# Patient Record
Sex: MALE | Race: WHITE | NOT HISPANIC OR LATINO | Employment: OTHER | URBAN - METROPOLITAN AREA
[De-identification: names, ages, dates, MRNs, and addresses within clinical notes are randomized per-mention and may not be internally consistent; named-entity substitution may affect disease eponyms.]

---

## 2017-04-12 PROBLEM — R65.20 SEVERE SEPSIS(995.92): Status: ACTIVE | Noted: 2017-04-12

## 2017-04-12 PROBLEM — A41.9 SEVERE SEPSIS(995.92): Status: ACTIVE | Noted: 2017-04-12

## 2017-04-12 PROBLEM — K52.9 ACUTE COLITIS: Status: ACTIVE | Noted: 2017-04-12

## 2017-04-12 PROBLEM — I67.9 CEREBRAL VASCULAR DISEASE: Status: ACTIVE | Noted: 2017-04-12

## 2017-04-12 PROBLEM — K92.2 LOWER GI BLEED: Status: ACTIVE | Noted: 2017-04-12

## 2017-04-12 PROBLEM — K56.609 SMALL BOWEL OBSTRUCTION: Status: ACTIVE | Noted: 2017-04-12

## 2017-04-12 PROBLEM — N39.0 URINARY TRACT INFECTION WITHOUT HEMATURIA: Status: ACTIVE | Noted: 2017-04-12

## 2017-04-16 PROBLEM — K55.9 ISCHEMIC COLITIS: Status: ACTIVE | Noted: 2017-04-16

## 2017-04-28 ENCOUNTER — TELEPHONE (OUTPATIENT)
Dept: GASTROENTEROLOGY | Facility: CLINIC | Age: 52
End: 2017-04-28

## 2017-04-28 NOTE — TELEPHONE ENCOUNTER
----- Message from Brie Cox sent at 4/28/2017  2:43 PM CDT -----  Contact: [facility] [phone]  States that the patient has to have an appointment for a hospital follow up.  Can you please call [phone] Monday.  Thank you

## 2017-05-25 ENCOUNTER — PATIENT OUTREACH (OUTPATIENT)
Dept: ADMINISTRATIVE | Facility: HOSPITAL | Age: 52
End: 2017-05-25

## 2017-05-25 NOTE — PROGRESS NOTES
Overdue eye exam report, due an office visit with him, your fasting cholesterol and diabetes labs, your annual diabetic eye and foot exams, a urine test for your kidneys, colon cancer screening, and possibly pneumonia and tetanus immunizations

## 2017-05-25 NOTE — LETTER
June 2, 2017    Nadeem Alegre  [address] [facility] [address]             [facility]  [address]  [facility] [address]  Phone: [phone] Dear Mr. Alegre:    We have tried to reach you by mychart unsuccessfully.    [facility] is committed to your overall health.  To help you get the most out of each of your visits, we will review your information to make sure you are up to date on all of your recommended tests and/or procedures.       We have Dr. Clifford Gomez listed as your primary care provider.  You have not been in to see him since July of 2014.  If Dr. Gomez is no longer your primary care provider, please contact me so that we may update our records accordingly.       He has found that you may be due for an office visit with him, your fasting cholesterol and diabetes labs, your annual diabetic eye and foot exams, a urine test for your kidneys, colon cancer screening, and possibly pneumonia and tetanus immunizations.     If you have had any of the above done at an outside facility, please let us know so I can update your record.  If you have a copy of these records, please provide a copy for us to scan into your chart.  If not, please provide that provider/facilities contact information so that we may obtain copies from that facility.     Otherwise, please schedule these appointments at your earliest convenience.     If you have any questions or concerns, please don't hesitate to call.    Thank you for letting us care for you,  Vanessa Arnold LPN Clinical Care Coordinator  [facility]  [phone]

## 2017-06-08 DIAGNOSIS — R29.898 HAND PROBLEMS: Primary | ICD-10-CM

## 2017-06-21 DIAGNOSIS — R29.898 HAND PROBLEMS: Primary | ICD-10-CM

## 2017-07-13 ENCOUNTER — CLINICAL SUPPORT (OUTPATIENT)
Dept: REHABILITATION | Facility: HOSPITAL | Age: 52
End: 2017-07-13
Attending: FAMILY MEDICINE
Payer: MEDICARE

## 2017-07-13 DIAGNOSIS — G81.94 LEFT HEMIPARESIS: ICD-10-CM

## 2017-07-13 PROCEDURE — G8987 SELF CARE CURRENT STATUS: HCPCS | Mod: CL,PO

## 2017-07-13 PROCEDURE — G8988 SELF CARE GOAL STATUS: HCPCS | Mod: CL,PO

## 2017-07-13 PROCEDURE — 97165 OT EVAL LOW COMPLEX 30 MIN: CPT | Mod: PO

## 2017-07-13 PROCEDURE — G8989 SELF CARE D/C STATUS: HCPCS | Mod: CL,PO

## 2017-07-13 NOTE — PROGRESS NOTES
Name: Nadeem Alegre  MRN: [id number]   Physician: J Carlos Morris MD     Diagnosis:   Encounter Diagnosis   Name Primary?    Left hemiparesis         Onset date: Oct. 2014 meningitis and CVA    Date of service: 7/13/17  Start time: 1130  End time: 1215    Orders: Bioness training    Subjective:  Patient goals: to use L hand better for self care  Chief Complaint:   Chief Complaint   Patient presents with    OT Initial Evaluation      Past Medical History:   Diagnosis Date    Anxiety 8/3/2012    Depression 8/3/2012    HEARING LOSS     LBP (low back pain) 7/30/2012    Lumbar spondylosis 8/5/2014    Sleep apnea     uses bipap    Stroke       Current Outpatient Prescriptions   Medication Sig    amantadine HCl 100 mg Tab Take 100 mg by mouth 2 (two) times daily.    ascorbic acid, vitamin C, (VITAMIN C) 500 MG tablet Take 500 mg by mouth 2 (two) times daily.     aspirin 325 MG tablet Take 1 tablet (325 mg total) by mouth once daily.    CRANBERRY FRUIT EXTRACT (CRANBERRY CONCENTRATE ORAL) Take 1 oz by mouth every evening.    cyclobenzaprine (AMRIX) 30 MG 24 hr capsule Take 30 mg by mouth daily as needed for Muscle spasms.    dextromethorphan-quinidine 20-10 mg (NUEDEXTA) 20-10 mg per capsule Take 1 capsule by mouth every 12 (twelve) hours.    docusate sodium (COLACE) 100 MG capsule Take 200 mg by mouth 2 (two) times daily.     fenofibrate (TRICOR) 48 MG tablet 1 tablet (48 mg total) by Per G Tube route once daily.    fenofibrate micronized (ANTARA) 43 mg capsule Take 43 mg by mouth before breakfast.    fish oil-omega-3 fatty acids 300-1,000 mg capsule Take 2 g by mouth 2 (two) times daily.     hydrOXYzine pamoate (VISTARIL) 25 MG Cap Take 25 mg by mouth every 4 (four) hours as needed.     magnesium oxide (MAG-OX) 400 mg tablet Take 400 mg by mouth 2 (two) times daily.     melatonin 5 mg Tab Take by mouth nightly as needed.     methocarbamol (ROBAXIN) 500 MG Tab Take 750 mg by mouth 3 (three) times  daily.     metoprolol tartrate (LOPRESSOR) 25 MG tablet Take 2 tablets (50 mg total) by mouth 2 (two) times daily.    modafinil (PROVIGIL) 200 MG Tab Take 200 mg by mouth once daily.    multivitamin (ONE DAILY MULTIVITAMIN) per tablet Take 1 tablet by mouth once daily.    ondansetron (ZOFRAN-ODT) 8 MG TbDL Take 1 tablet (8 mg total) by mouth every 8 (eight) hours as needed.    pantoprazole (PROTONIX) 40 mg GrPS 1 packet (40 mg total) by Per G Tube route once daily.    polyethylene glycol (GLYCOLAX) 17 gram PwPk Take 17 g by mouth 2 (two) times daily.    potassium chloride SA (K-DUR,KLOR-CON) 20 MEQ tablet Take 20 mEq by mouth 2 (two) times daily.     senna (SENOKOT) 8.6 mg tablet Take 2 tablets by mouth 2 (two) times daily.     vitamin E 100 UNIT capsule Take 100 Units by mouth once daily.    zinc gluconate 50 mg tablet Take 50 mg by mouth once daily.     No current facility-administered medications for this visit.      Precautions: fall  Social Hx: disabled [employer]  lives with their spouse and young adult son    DME: power wheelchair with tilt, recline and stand J3 cushion, sheila, hospital bed,    Home access: w/c accessible    Review of patient's allergies indicates:  No Known Allergies      Past treatment includes: Home health PT and OT, Care PT in [facility] prior to April hosp for collitis.     Precautions:  Pain: 0/10 at rest.  Dominant hand: right    Occupation/hobbies/homemaking: Watch TV, card games, [employer]  Job description includes: disabled  Driving status: n/a    Objective  Cognitive Exam  Oriented: slow to respond  Behaviors: smiles appropriately  Follows Commands/attention: Inattentive  Communication: expressive aphasia  Memory: Poor immediate recall  Safety awareness/insight to disability: fair  Coping skills/emotional control: Appropriate to situation    Visual/perceptual: not assessed due to MD orders for Bioness training    Physical Exam:    Postural  examination/scapula alignment: B rounded shoulders  Joint integrity: WNL  Skin integrity: Tears on L elbow  Edema: none    Palpation: no palpable tenderness    Sensation: Nadeem reports normal sensation         Joint Evaluation AROM PROM    Left Left   Shoulder flex 0-180 90 115   Shoulder Abd 0-180 70 100   Elbow flex/ext 0-150     Wrist flex 0-80 WNL WNL   Wrist ext 0-70 WNL with finger flex WNL   Supination 0-80 WNL WNL   Pronation 0-80 WNL WNL     Fist: Slow to open and close. Fingers do not move together with open /close and pt. Cannot perform isolated finger movements.     Tone:  Modified Jairo Scale:   1-  Slight increase in muscle tone, manifested by a catch and release or by minimal resistance at the end of the range of motion when the affected part(s) is moved in flexion or extension    Balance:   Static Sit Fair  Dynamic sit  Fair  Static Stand  N/a  Dynamic stand  N/A    Functional Status:                                  Functional Mobility:  Bed mobility: Max A  Roll to left: Max A  Roll to right: Max A  Supine to sit: Max A  Sit to supine: Max A  Transfers to bed: D Jacoby  Transfers to toilet: D  Car transfers: stays in wheelchair  Wheelchair mobility: Min A with power wheelchair    ADL's:  Feeding: Min A  Grooming: Min A  Hygiene: Min A  UB Dressing: Max A  LB Dressing: Max A  Toileting: D  Bathing: Max A    IADL's:  Homecare: D  Cooking: D  Laundry: D  Yard work: D  Use of telephone: D  Money management: D  Medication management: D        NeuroQOL - Upper Extremity  Results:  Score  Intake 26.2  Patient responses to NeuroQOL - Upper Extremity were as follows:  Question Response at Intake Functional Limitation  Ability to button your shirt: With much difficulty Self Care -   Ability to turn a key in a lock: With some difficulty Carrying, Moving & Handling  Objects -   Ability to open and close a zipper: With much difficulty Self Care -   Difficulty putting on a pullover  shirt: A Lot of Difficulty Self Care -     TODAY'S TREATMENT  Pt. Has received a Bioness H200 with Flex A and Ext C panels. Pt., his wife and another caregiver taught how to keysha and doff and care for unit once OT set controller. Setting A is neuro modulation that should be done 10 minutes with WB and Setting B is open/close x 30 minutes that should be done with reach,grasp and place/release tasks.   Pt. Was given therapist card for home health therapist and for him/caregivers so they could follow up by phone or e-mail in regards to use and adjustment needs.     ASSESSMENT:  OT diagnosis: R hemiparesis    Assessment  Nadeem Alegre is a 52 y.o. male with a medical diagnosis of   Encounter Diagnosis   Name Primary?    Left hemiparesis     referred to occupational therapy for Bioness H200 training.       Pt. Currently receiving OT HH. OP follow up for adjustments only.   PROBLEM LIST/IMPAIRMENTS:   R hemiparesis affecting self care.     LTG GOALS:  Time frame: 3 months  Pt. Will hold items in L hand that are being manipulated by right.  Pt. Wife will be able to keysha/doff H200 and direct patient in HEP    PLAN:    Patient/caregiver understands and agrees with plan of care. Pt. Wife to call if any needs for adjustment of H200.     Sharonda Quiles, OT

## 2017-07-14 NOTE — PLAN OF CARE
Name: Nadeem Alegre  MRN: [id number]   Physician: J Carlos Morris MD     Diagnosis:   Encounter Diagnosis   Name Primary?    Left hemiparesis         Onset date: Oct. 2014 meningitis and CVA    Date of service: 7/13/17  Start time: 1130  End time: 1215    Orders: Bioness training    Subjective:  Patient goals: to use L hand better for self care  Chief Complaint:   Chief Complaint   Patient presents with    OT Initial Evaluation      Past Medical History:   Diagnosis Date    Anxiety 8/3/2012    Depression 8/3/2012    HEARING LOSS     LBP (low back pain) 7/30/2012    Lumbar spondylosis 8/5/2014    Sleep apnea     uses bipap    Stroke       Current Outpatient Prescriptions   Medication Sig    amantadine HCl 100 mg Tab Take 100 mg by mouth 2 (two) times daily.    ascorbic acid, vitamin C, (VITAMIN C) 500 MG tablet Take 500 mg by mouth 2 (two) times daily.     aspirin 325 MG tablet Take 1 tablet (325 mg total) by mouth once daily.    CRANBERRY FRUIT EXTRACT (CRANBERRY CONCENTRATE ORAL) Take 1 oz by mouth every evening.    cyclobenzaprine (AMRIX) 30 MG 24 hr capsule Take 30 mg by mouth daily as needed for Muscle spasms.    dextromethorphan-quinidine 20-10 mg (NUEDEXTA) 20-10 mg per capsule Take 1 capsule by mouth every 12 (twelve) hours.    docusate sodium (COLACE) 100 MG capsule Take 200 mg by mouth 2 (two) times daily.     fenofibrate (TRICOR) 48 MG tablet 1 tablet (48 mg total) by Per G Tube route once daily.    fenofibrate micronized (ANTARA) 43 mg capsule Take 43 mg by mouth before breakfast.    fish oil-omega-3 fatty acids 300-1,000 mg capsule Take 2 g by mouth 2 (two) times daily.     hydrOXYzine pamoate (VISTARIL) 25 MG Cap Take 25 mg by mouth every 4 (four) hours as needed.     magnesium oxide (MAG-OX) 400 mg tablet Take 400 mg by mouth 2 (two) times daily.     melatonin 5 mg Tab Take by mouth nightly as needed.     methocarbamol (ROBAXIN) 500 MG Tab Take 750 mg by mouth 3 (three) times  daily.     metoprolol tartrate (LOPRESSOR) 25 MG tablet Take 2 tablets (50 mg total) by mouth 2 (two) times daily.    modafinil (PROVIGIL) 200 MG Tab Take 200 mg by mouth once daily.    multivitamin (ONE DAILY MULTIVITAMIN) per tablet Take 1 tablet by mouth once daily.    ondansetron (ZOFRAN-ODT) 8 MG TbDL Take 1 tablet (8 mg total) by mouth every 8 (eight) hours as needed.    pantoprazole (PROTONIX) 40 mg GrPS 1 packet (40 mg total) by Per G Tube route once daily.    polyethylene glycol (GLYCOLAX) 17 gram PwPk Take 17 g by mouth 2 (two) times daily.    potassium chloride SA (K-DUR,KLOR-CON) 20 MEQ tablet Take 20 mEq by mouth 2 (two) times daily.     senna (SENOKOT) 8.6 mg tablet Take 2 tablets by mouth 2 (two) times daily.     vitamin E 100 UNIT capsule Take 100 Units by mouth once daily.    zinc gluconate 50 mg tablet Take 50 mg by mouth once daily.     No current facility-administered medications for this visit.      Precautions: fall  Social Hx: disabled [employer]  lives with their spouse and young adult son    DME: power wheelchair with tilt, recline and stand J3 cushion, sheila, hospital bed,    Home access: w/c accessible    Review of patient's allergies indicates:  No Known Allergies      Past treatment includes: Home health PT and OT, Care PT in [facility] prior to April hosp for collitis.     Precautions:  Pain: 0/10 at rest.  Dominant hand: right    Occupation/hobbies/homemaking: Watch TV, card games, [employer]  Job description includes: disabled  Driving status: n/a    Objective  Cognitive Exam  Oriented: slow to respond  Behaviors: smiles appropriately  Follows Commands/attention: Inattentive  Communication: expressive aphasia  Memory: Poor immediate recall  Safety awareness/insight to disability: fair  Coping skills/emotional control: Appropriate to situation    Visual/perceptual: not assessed due to MD orders for Bioness training    Physical Exam:    Postural  examination/scapula alignment: B rounded shoulders  Joint integrity: WNL  Skin integrity: Tears on L elbow  Edema: none    Palpation: no palpable tenderness    Sensation: Nadeem reports normal sensation         Joint Evaluation AROM PROM    Left Left   Shoulder flex 0-180 90 115   Shoulder Abd 0-180 70 100   Elbow flex/ext 0-150     Wrist flex 0-80 WNL WNL   Wrist ext 0-70 WNL with finger flex WNL   Supination 0-80 WNL WNL   Pronation 0-80 WNL WNL     Fist: Slow to open and close. Fingers do not move together with open /close and pt. Cannot perform isolated finger movements.     Tone:  Modified Jairo Scale:   1-  Slight increase in muscle tone, manifested by a catch and release or by minimal resistance at the end of the range of motion when the affected part(s) is moved in flexion or extension    Balance:   Static Sit Fair  Dynamic sit  Fair  Static Stand  N/a  Dynamic stand  N/A    Functional Status:                                  Functional Mobility:  Bed mobility: Max A  Roll to left: Max A  Roll to right: Max A  Supine to sit: Max A  Sit to supine: Max A  Transfers to bed: D Jacoby  Transfers to toilet: D  Car transfers: stays in wheelchair  Wheelchair mobility: Min A with power wheelchair    ADL's:  Feeding: Min A  Grooming: Min A  Hygiene: Min A  UB Dressing: Max A  LB Dressing: Max A  Toileting: D  Bathing: Max A    IADL's:  Homecare: D  Cooking: D  Laundry: D  Yard work: D  Use of telephone: D  Money management: D  Medication management: D        NeuroQOL - Upper Extremity  Results:  Score  Intake 26.2  Patient responses to NeuroQOL - Upper Extremity were as follows:  Question Response at Intake Functional Limitation  Ability to button your shirt: With much difficulty Self Care -   Ability to turn a key in a lock: With some difficulty Carrying, Moving & Handling  Objects -   Ability to open and close a zipper: With much difficulty Self Care -   Difficulty putting on a pullover  shirt: A Lot of Difficulty Self Care -     TODAY'S TREATMENT  Pt. Has received a Bioness H200 with Flex A and Ext C panels. Pt., his wife and another caregiver taught how to keysha and doff and care for unit once OT set controller. Setting A is neuro modulation that should be done 10 minutes with WB and Setting B is open/close x 30 minutes that should be done with reach,grasp and place/release tasks.   Pt. Was given therapist card for home health therapist and for him/caregivers so they could follow up by phone or e-mail in regards to use and adjustment needs.     ASSESSMENT:  OT diagnosis: R hemiparesis    Assessment  Nadeem Alegre is a 52 y.o. male with a medical diagnosis of   Encounter Diagnosis   Name Primary?    Left hemiparesis     referred to occupational therapy for Bioness H200 training.       Pt. Currently receiving OT HH. OP follow up for adjustments only.   PROBLEM LIST/IMPAIRMENTS:   R hemiparesis affecting self care.     LTG GOALS:  Time frame: 3 months  Pt. Will hold items in L hand that are being manipulated by right.  Pt. Wife will be able to keysha/doff H200 and direct patient in HEP    PLAN:    Patient/caregiver understands and agrees with plan of care. Pt. Wife to call if any needs for adjustment of H200.     Sharonda Quiles, OT

## 2017-07-27 ENCOUNTER — PATIENT OUTREACH (OUTPATIENT)
Dept: ADMINISTRATIVE | Facility: HOSPITAL | Age: 52
End: 2017-07-27

## 2017-07-27 NOTE — PROGRESS NOTES
Eye exam report, due an office visit with him, your fasting cholesterol and diabetes labs, a urine test for your kidneys, your annual diabetic eye and foot exams, colon cancer screening, and possibly tetanus and pneumonia immunizations    Last ov Jason 8/2014, 2nd attempt

## 2017-07-27 NOTE — LETTER
August 7, 2017    Nadeem Sis  [address] [facility] [address]             [facility]  [address]  [facility] [address]  Phone: [phone] Dear Mr. Alegre:    We have tried to reach you by mychart unsuccessfully.    [facility] is committed to your overall health.  To help you get the most out of each of your visits, we will review your information to make sure you are up to date on all of your recommended tests and/or procedures.       We have Dr. Clifford Gomez listed as your primary care provider. You have not been in to see him since August of 2014.  If Dr. Gomez is no longer your primary care provider, please contact me so that we may update our records accordingly.       He has found that you may be due for an office visit with him, your fasting cholesterol and diabetes labs, a urine test for your kidneys, your annual diabetic eye and foot exams, colon cancer screening, and possibly tetanus and pneumonia immunizations.     If you have had any of the above done at an outside facility, please let us know so I can update your record.  If you have a copy of these records, please provide a copy for us to scan into your chart.  If not, please provide that provider/facilities contact information so that we may obtain copies from that facility.     Otherwise, please schedule these appointments at your earliest convenience.     If you have any questions or concerns, please don't hesitate to call.    Thank you for letting us care for you,  Vanessa Arnold LPN Clinical Care Coordinator  [facility] [place]  [phone]

## 2019-05-23 PROCEDURE — G0179 PR HOME HEALTH MD RECERTIFICATION: ICD-10-PCS | Mod: ,,, | Performed by: INTERNAL MEDICINE

## 2019-05-23 PROCEDURE — G0179 MD RECERTIFICATION HHA PT: HCPCS | Mod: ,,, | Performed by: INTERNAL MEDICINE

## 2019-06-04 ENCOUNTER — TELEPHONE (OUTPATIENT)
Dept: FAMILY MEDICINE | Facility: CLINIC | Age: 54
End: 2019-06-04

## 2019-06-04 NOTE — TELEPHONE ENCOUNTER
----- Message from Henna Orourke sent at 6/4/2019  1:23 PM CDT -----  Contact: [facility] need to speak to nurse regarding if  would be following patient and signing orders     [facility] [phone] ask for Sabine

## 2019-06-04 NOTE — TELEPHONE ENCOUNTER
Former Art[facility] patient; wants to establish with [facility] and needs MD to sign all HH paperwork.  Please advise on whether you will accept patient

## 2019-06-18 ENCOUNTER — TELEPHONE (OUTPATIENT)
Dept: FAMILY MEDICINE | Facility: CLINIC | Age: 54
End: 2019-06-18

## 2019-06-18 NOTE — TELEPHONE ENCOUNTER
----- Message from Clifford Latham sent at 6/18/2019 10:21 AM CDT -----  Contact: Mely - Spouse  Type: Needs Medical Advice    Who Called:  Mely  Darian Call Back Number: [phone]  Additional Information: Caller would like to discuss home health orders. Thanks!

## 2019-06-28 ENCOUNTER — LAB VISIT (OUTPATIENT)
Dept: LAB | Facility: HOSPITAL | Age: 54
End: 2019-06-28
Attending: INTERNAL MEDICINE
Payer: MEDICARE

## 2019-06-28 ENCOUNTER — OFFICE VISIT (OUTPATIENT)
Dept: FAMILY MEDICINE | Facility: CLINIC | Age: 54
End: 2019-06-28
Payer: MEDICARE

## 2019-06-28 VITALS
OXYGEN SATURATION: 97 % | DIASTOLIC BLOOD PRESSURE: 82 MMHG | HEART RATE: 69 BPM | SYSTOLIC BLOOD PRESSURE: 106 MMHG | BODY MASS INDEX: 33.86 KG/M2 | HEIGHT: 72 IN | WEIGHT: 250 LBS

## 2019-06-28 DIAGNOSIS — E78.2 MIXED HYPERLIPIDEMIA: Chronic | ICD-10-CM

## 2019-06-28 DIAGNOSIS — G47.33 OBSTRUCTIVE SLEEP APNEA (ADULT) (PEDIATRIC): Chronic | ICD-10-CM

## 2019-06-28 DIAGNOSIS — I63.50 CEREBRAL ARTERY OCCLUSION WITH CEREBRAL INFARCTION: ICD-10-CM

## 2019-06-28 DIAGNOSIS — E11.65 UNCONTROLLED TYPE 2 DIABETES MELLITUS WITH HYPERGLYCEMIA: ICD-10-CM

## 2019-06-28 DIAGNOSIS — L89.152 DECUBITUS ULCER OF SACRAL REGION, STAGE 2: ICD-10-CM

## 2019-06-28 DIAGNOSIS — Z12.5 PROSTATE CANCER SCREENING: ICD-10-CM

## 2019-06-28 DIAGNOSIS — R33.9 URINARY RETENTION: ICD-10-CM

## 2019-06-28 DIAGNOSIS — K21.9 GASTROESOPHAGEAL REFLUX DISEASE WITHOUT ESOPHAGITIS: ICD-10-CM

## 2019-06-28 DIAGNOSIS — G93.40 ENCEPHALOPATHY: Primary | ICD-10-CM

## 2019-06-28 DIAGNOSIS — I69.354 SPASTIC HEMIPLEGIA OF LEFT NONDOMINANT SIDE AS LATE EFFECT OF CEREBRAL INFARCTION: ICD-10-CM

## 2019-06-28 DIAGNOSIS — R53.81 DEBILITY: ICD-10-CM

## 2019-06-28 DIAGNOSIS — K55.039: ICD-10-CM

## 2019-06-28 PROBLEM — K55.9 ISCHEMIC COLITIS: Status: RESOLVED | Noted: 2017-04-16 | Resolved: 2019-06-28

## 2019-06-28 PROBLEM — K56.609 SMALL BOWEL OBSTRUCTION: Status: RESOLVED | Noted: 2017-04-12 | Resolved: 2019-06-28

## 2019-06-28 PROBLEM — K52.9 ACUTE COLITIS: Status: RESOLVED | Noted: 2017-04-12 | Resolved: 2019-06-28

## 2019-06-28 PROBLEM — A41.9 SEVERE SEPSIS(995.92): Status: RESOLVED | Noted: 2017-04-12 | Resolved: 2019-06-28

## 2019-06-28 PROBLEM — R65.20 SEVERE SEPSIS(995.92): Status: RESOLVED | Noted: 2017-04-12 | Resolved: 2019-06-28

## 2019-06-28 PROBLEM — N39.0 URINARY TRACT INFECTION WITHOUT HEMATURIA: Status: RESOLVED | Noted: 2017-04-12 | Resolved: 2019-06-28

## 2019-06-28 PROBLEM — K92.2 LOWER GI BLEED: Status: RESOLVED | Noted: 2017-04-12 | Resolved: 2019-06-28

## 2019-06-28 LAB
ALBUMIN SERPL BCP-MCNC: 3.8 G/DL (ref 3.5–5.2)
ALP SERPL-CCNC: 68 U/L (ref 55–135)
ALT SERPL W/O P-5'-P-CCNC: 17 U/L (ref 10–44)
ANION GAP SERPL CALC-SCNC: 10 MMOL/L (ref 8–16)
AST SERPL-CCNC: 14 U/L (ref 10–40)
BASOPHILS # BLD AUTO: 0.07 K/UL (ref 0–0.2)
BASOPHILS NFR BLD: 1.2 % (ref 0–1.9)
BILIRUB SERPL-MCNC: 0.4 MG/DL (ref 0.1–1)
BUN SERPL-MCNC: 14 MG/DL (ref 6–20)
CALCIUM SERPL-MCNC: 9.5 MG/DL (ref 8.7–10.5)
CHLORIDE SERPL-SCNC: 108 MMOL/L (ref 95–110)
CHOLEST SERPL-MCNC: 163 MG/DL (ref 120–199)
CHOLEST/HDLC SERPL: 5.6 {RATIO} (ref 2–5)
CO2 SERPL-SCNC: 23 MMOL/L (ref 23–29)
COMPLEXED PSA SERPL-MCNC: 0.49 NG/ML (ref 0–4)
CREAT SERPL-MCNC: 0.9 MG/DL (ref 0.5–1.4)
DIFFERENTIAL METHOD: ABNORMAL
EOSINOPHIL # BLD AUTO: 0.1 K/UL (ref 0–0.5)
EOSINOPHIL NFR BLD: 2.4 % (ref 0–8)
ERYTHROCYTE [DISTWIDTH] IN BLOOD BY AUTOMATED COUNT: 14.9 % (ref 11.5–14.5)
EST. GFR  (AFRICAN AMERICAN): >60 ML/MIN/1.73 M^2
EST. GFR  (NON AFRICAN AMERICAN): >60 ML/MIN/1.73 M^2
GLUCOSE SERPL-MCNC: 92 MG/DL (ref 70–110)
HCT VFR BLD AUTO: 46.3 % (ref 40–54)
HDLC SERPL-MCNC: 29 MG/DL (ref 40–75)
HDLC SERPL: 17.8 % (ref 20–50)
HGB BLD-MCNC: 14.2 G/DL (ref 14–18)
IMM GRANULOCYTES # BLD AUTO: 0.01 K/UL (ref 0–0.04)
IMM GRANULOCYTES NFR BLD AUTO: 0.2 % (ref 0–0.5)
LDLC SERPL CALC-MCNC: 115.4 MG/DL (ref 63–159)
LYMPHOCYTES # BLD AUTO: 2.6 K/UL (ref 1–4.8)
LYMPHOCYTES NFR BLD: 45.4 % (ref 18–48)
MCH RBC QN AUTO: 27.8 PG (ref 27–31)
MCHC RBC AUTO-ENTMCNC: 30.7 G/DL (ref 32–36)
MCV RBC AUTO: 91 FL (ref 82–98)
MONOCYTES # BLD AUTO: 0.5 K/UL (ref 0.3–1)
MONOCYTES NFR BLD: 9.1 % (ref 4–15)
NEUTROPHILS # BLD AUTO: 2.4 K/UL (ref 1.8–7.7)
NEUTROPHILS NFR BLD: 41.7 % (ref 38–73)
NONHDLC SERPL-MCNC: 134 MG/DL
NRBC BLD-RTO: 0 /100 WBC
PLATELET # BLD AUTO: 224 K/UL (ref 150–350)
PMV BLD AUTO: 11.9 FL (ref 9.2–12.9)
POTASSIUM SERPL-SCNC: 4.1 MMOL/L (ref 3.5–5.1)
PROT SERPL-MCNC: 7.6 G/DL (ref 6–8.4)
RBC # BLD AUTO: 5.11 M/UL (ref 4.6–6.2)
SODIUM SERPL-SCNC: 141 MMOL/L (ref 136–145)
TRIGL SERPL-MCNC: 93 MG/DL (ref 30–150)
TSH SERPL DL<=0.005 MIU/L-ACNC: 1.76 UIU/ML (ref 0.4–4)
WBC # BLD AUTO: 5.81 K/UL (ref 3.9–12.7)

## 2019-06-28 PROCEDURE — 84153 ASSAY OF PSA TOTAL: CPT

## 2019-06-28 PROCEDURE — 3008F PR BODY MASS INDEX (BMI) DOCUMENTED: ICD-10-PCS | Mod: CPTII,S$GLB,, | Performed by: INTERNAL MEDICINE

## 2019-06-28 PROCEDURE — 99999 PR PBB SHADOW E&M-EST. PATIENT-LVL III: ICD-10-PCS | Mod: PBBFAC,,, | Performed by: INTERNAL MEDICINE

## 2019-06-28 PROCEDURE — 84443 ASSAY THYROID STIM HORMONE: CPT

## 2019-06-28 PROCEDURE — 3008F BODY MASS INDEX DOCD: CPT | Mod: CPTII,S$GLB,, | Performed by: INTERNAL MEDICINE

## 2019-06-28 PROCEDURE — 3074F PR MOST RECENT SYSTOLIC BLOOD PRESSURE < 130 MM HG: ICD-10-PCS | Mod: CPTII,S$GLB,, | Performed by: INTERNAL MEDICINE

## 2019-06-28 PROCEDURE — 3079F DIAST BP 80-89 MM HG: CPT | Mod: CPTII,S$GLB,, | Performed by: INTERNAL MEDICINE

## 2019-06-28 PROCEDURE — 99999 PR PBB SHADOW E&M-EST. PATIENT-LVL III: CPT | Mod: PBBFAC,,, | Performed by: INTERNAL MEDICINE

## 2019-06-28 PROCEDURE — 85025 COMPLETE CBC W/AUTO DIFF WBC: CPT

## 2019-06-28 PROCEDURE — 80053 COMPREHEN METABOLIC PANEL: CPT

## 2019-06-28 PROCEDURE — 99214 OFFICE O/P EST MOD 30 MIN: CPT | Mod: S$GLB,,, | Performed by: INTERNAL MEDICINE

## 2019-06-28 PROCEDURE — 36415 COLL VENOUS BLD VENIPUNCTURE: CPT | Mod: PO

## 2019-06-28 PROCEDURE — 3079F PR MOST RECENT DIASTOLIC BLOOD PRESSURE 80-89 MM HG: ICD-10-PCS | Mod: CPTII,S$GLB,, | Performed by: INTERNAL MEDICINE

## 2019-06-28 PROCEDURE — 80061 LIPID PANEL: CPT

## 2019-06-28 PROCEDURE — 99214 PR OFFICE/OUTPT VISIT, EST, LEVL IV, 30-39 MIN: ICD-10-PCS | Mod: S$GLB,,, | Performed by: INTERNAL MEDICINE

## 2019-06-28 PROCEDURE — 3074F SYST BP LT 130 MM HG: CPT | Mod: CPTII,S$GLB,, | Performed by: INTERNAL MEDICINE

## 2019-06-28 RX ORDER — NAPROXEN SODIUM 220 MG/1
81 TABLET, FILM COATED ORAL DAILY
Refills: 0 | Status: ON HOLD | COMMUNITY
Start: 2019-06-28 | End: 2020-04-01 | Stop reason: HOSPADM

## 2019-06-28 NOTE — PROGRESS NOTES
Patient ID: Nadeem Alegre     Chief Complaint:   Chief Complaint   Patient presents with    [facility]        HPI: New Patient to me and to [facility]. 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 He also had a Right CVA afterwards and has been severely disabled since then. He presents today in a power chair w/ family in attendance.   He gets the majority of his care thorough the [facility]. He does have a suprapubic cath and is prone to UTI's- last one was resistant to Bactrim.   Also has a sacral decub that is chronic and waxes and wanes. Needs Colonoscopy but it will be technically difficult.     Review of Systems   Constitutional: Positive for activity change. Negative for unexpected weight change.   HENT: Positive for hearing loss and trouble swallowing. Negative for rhinorrhea.    Eyes: Positive for visual disturbance. Negative for discharge.   Respiratory: Negative for chest tightness and wheezing.    Cardiovascular: Negative for chest pain and palpitations.   Gastrointestinal: Positive for blood in stool and constipation. Negative for diarrhea and vomiting.   Endocrine: Negative for polydipsia and polyuria.   Genitourinary: Positive for difficulty urinating and hematuria. Negative for urgency.   Musculoskeletal: Positive for arthralgias, joint swelling and neck pain.   Skin: Negative.    Allergic/Immunologic: Negative.    Neurological: Positive for weakness. Negative for headaches.   Psychiatric/Behavioral: Positive for confusion. Negative for dysphoric mood.   All other systems reviewed and are negative.         Objective:      Physical Exam   Physical Exam   Constitutional: He is oriented to person, place, and time. He appears well-developed and well-nourished.   HENT:   Head: Normocephalic and atraumatic.   Nose: Nose normal.   Mouth/Throat: Oropharynx is clear and moist.   Eyes: Pupils are equal, round, and reactive to light. Conjunctivae and EOM are normal.   Neck: Normal range of  motion. Neck supple.   Cardiovascular: Normal rate, regular rhythm, normal heart sounds and intact distal pulses.   Pulmonary/Chest: Effort normal and breath sounds normal.   Abdominal: Soft. Bowel sounds are normal.   Musculoskeletal:   Left hemiparesis.  Ambulates in power chair.    Neurological: He is alert and oriented to person, place, and time. He exhibits abnormal muscle tone. Coordination abnormal.   Skin: Skin is warm and dry. Capillary refill takes less than 2 seconds.   Psychiatric: He has a normal mood and affect. His behavior is normal. Judgment and thought content normal.   Nursing note and vitals reviewed.         Vitals:   Vitals:    06/28/19 1158   BP: 106/82   Pulse: 69       Assessment:       Patient Active Problem List    Diagnosis Date Noted    Debility 06/28/2019    Urinary retention 06/28/2019    Gastroesophageal reflux disease without esophagitis 06/28/2019    Acute ischemia of large intestine 06/28/2019    Left hemiparesis 07/13/2017    Cerebral vascular disease 04/12/2017    Hypomagnesemia 11/25/2014    Decubitus ulcer of sacral region, stage 2 11/24/2014    Involuntary movements 11/22/2014    Status post mastoidectomy 11/21/2014    Cerebral artery occlusion with cerebral infarction 11/21/2014    HLD (hyperlipidemia) 11/21/2014    Encephalopathy 11/20/2014    Obesity, Class II, BMI 35-39.9, with comorbidity 08/03/2012    Obstructive sleep apnea (adult) (pediatric) 08/03/2012          Plan:       Nadeem was seen today for establish care.    Diagnoses and all orders for this visit:    Encephalopathy  Slowly improving     Mixed hyperlipidemia  -     CBC auto differential; Future  -     Comprehensive metabolic panel; Future  -     Lipid panel; Future  -     TSH; Future    Obstructive sleep apnea (adult) (pediatric)  Uses CPAP on/ off    Cerebral artery occlusion with cerebral infarction  Aspirin    Debility  CONTROLLED      Urinary retention  S/p Suprapubic Cath     Decubitus  ulcer of sacral region, stage 2  Offloading and local wound care     Gastroesophageal reflux disease without esophagitis  CONTROLLED  W/ PPi    Spastic hemiplegia of left nondominant side as late effect of cerebral infarction  Stable     Prostate cancer screening  -     PSA, Screening; Future    Other orders  -     aspirin 81 MG Chew; Take 1 tablet (81 mg total) by mouth once daily.

## 2019-07-05 DIAGNOSIS — Z12.11 COLON CANCER SCREENING: ICD-10-CM

## 2019-07-12 ENCOUNTER — DOCUMENTATION ONLY (OUTPATIENT)
Dept: FAMILY MEDICINE | Facility: CLINIC | Age: 54
End: 2019-07-12

## 2019-07-22 PROCEDURE — G0179 PR HOME HEALTH MD RECERTIFICATION: ICD-10-PCS | Mod: ,,, | Performed by: INTERNAL MEDICINE

## 2019-07-22 PROCEDURE — G0179 MD RECERTIFICATION HHA PT: HCPCS | Mod: ,,, | Performed by: INTERNAL MEDICINE

## 2019-08-13 ENCOUNTER — PATIENT MESSAGE (OUTPATIENT)
Dept: FAMILY MEDICINE | Facility: CLINIC | Age: 54
End: 2019-08-13

## 2019-08-13 ENCOUNTER — TELEPHONE (OUTPATIENT)
Dept: FAMILY MEDICINE | Facility: CLINIC | Age: 54
End: 2019-08-13

## 2019-08-13 NOTE — TELEPHONE ENCOUNTER
----- Message from Keysha Chandler sent at 8/13/2019  1:52 PM CDT -----  Type: Needs Medical Advice    Who Called:  Sabine donnelly [facility]  Symptoms (please be specific):  UTI  How long has patient had these symptoms:   Pharmacy name and phone #: [facility] [phone]  Best Call Back Number: [phone]  Additional Information: results faxed on 08/09/19 and 08/12/19, contact to advise if medication will be order

## 2019-08-14 DIAGNOSIS — N30.00 ACUTE CYSTITIS WITHOUT HEMATURIA: Primary | ICD-10-CM

## 2019-08-14 RX ORDER — NITROFURANTOIN (MACROCRYSTALS) 100 MG/1
100 CAPSULE ORAL EVERY 6 HOURS
Qty: 28 CAPSULE | Refills: 0 | Status: SHIPPED | OUTPATIENT
Start: 2019-08-14 | End: 2019-08-21

## 2019-08-14 NOTE — TELEPHONE ENCOUNTER
----- Message from Shiloh Newberry sent at 8/14/2019  8:35 AM CDT -----  Contact: Patient's Spouse (Mely)  Type:  Patient Returning Call    Who Called:  spouse  Who Left Message for Patient: Leesa Barrett   Does the patient know what this is regarding?:  Test results  Best Call Back Number:  [phone]  Additional Information:  Pt Spouse returning the nurses call Please Advise--Thank you

## 2019-08-14 NOTE — TELEPHONE ENCOUNTER
You're right, he has a UTI. I want to give him Nitrofurantoin based on the urine culture growing Klebsiella and Enterococcus. What pharmacy do I send it to?

## 2019-08-25 RX ORDER — PANTOPRAZOLE SODIUM 40 MG/1
40 FOR SUSPENSION ORAL DAILY
Qty: 30 PACKET | Refills: 11 | Status: SHIPPED | OUTPATIENT
Start: 2019-08-25 | End: 2021-12-07

## 2019-08-25 RX ORDER — POTASSIUM CHLORIDE 20 MEQ/1
20 TABLET, EXTENDED RELEASE ORAL 2 TIMES DAILY
Qty: 180 TABLET | Refills: 3 | Status: SHIPPED | OUTPATIENT
Start: 2019-08-25 | End: 2023-11-21

## 2019-08-25 RX ORDER — POLYETHYLENE GLYCOL 3350 17 G/17G
17 POWDER, FOR SOLUTION ORAL 2 TIMES DAILY
Qty: 100 EACH | Refills: 6 | Status: SHIPPED | OUTPATIENT
Start: 2019-08-25

## 2019-08-26 RX ORDER — ATORVASTATIN CALCIUM 20 MG/1
20 TABLET, FILM COATED ORAL DAILY
Qty: 90 TABLET | Refills: 3 | Status: SHIPPED | OUTPATIENT
Start: 2019-08-26 | End: 2023-01-06

## 2019-09-03 ENCOUNTER — TELEPHONE (OUTPATIENT)
Dept: FAMILY MEDICINE | Facility: CLINIC | Age: 54
End: 2019-09-03

## 2019-09-04 ENCOUNTER — TELEPHONE (OUTPATIENT)
Dept: HOME HEALTH SERVICES | Facility: HOSPITAL | Age: 54
End: 2019-09-04

## 2019-09-20 PROCEDURE — G0179 PR HOME HEALTH MD RECERTIFICATION: ICD-10-PCS | Mod: ,,, | Performed by: INTERNAL MEDICINE

## 2019-09-20 PROCEDURE — G0179 MD RECERTIFICATION HHA PT: HCPCS | Mod: ,,, | Performed by: INTERNAL MEDICINE

## 2019-09-25 ENCOUNTER — TELEPHONE (OUTPATIENT)
Dept: HOME HEALTH SERVICES | Facility: HOSPITAL | Age: 54
End: 2019-09-25

## 2019-09-25 ENCOUNTER — EXTERNAL HOME HEALTH (OUTPATIENT)
Dept: HOME HEALTH SERVICES | Facility: HOSPITAL | Age: 54
End: 2019-09-25
Payer: MEDICARE

## 2019-10-08 ENCOUNTER — TELEPHONE (OUTPATIENT)
Dept: FAMILY MEDICINE | Facility: CLINIC | Age: 54
End: 2019-10-08

## 2019-10-18 ENCOUNTER — PATIENT MESSAGE (OUTPATIENT)
Dept: FAMILY MEDICINE | Facility: CLINIC | Age: 54
End: 2019-10-18

## 2019-10-20 RX ORDER — CETIRIZINE HYDROCHLORIDE 1 MG/ML
10 SOLUTION ORAL DAILY
Qty: 473 ML | Refills: 0 | Status: SHIPPED | OUTPATIENT
Start: 2019-10-20 | End: 2020-10-19

## 2019-10-20 RX ORDER — IBUPROFEN 800 MG/1
800 TABLET ORAL EVERY 8 HOURS PRN
Qty: 90 TABLET | Refills: 5 | Status: SHIPPED | OUTPATIENT
Start: 2019-10-20

## 2019-10-21 ENCOUNTER — EXTERNAL HOME HEALTH (OUTPATIENT)
Dept: HOME HEALTH SERVICES | Facility: HOSPITAL | Age: 54
End: 2019-10-21
Payer: MEDICARE

## 2019-10-21 ENCOUNTER — PATIENT MESSAGE (OUTPATIENT)
Dept: ADMINISTRATIVE | Facility: HOSPITAL | Age: 54
End: 2019-10-21

## 2019-10-24 ENCOUNTER — TELEPHONE (OUTPATIENT)
Dept: HOME HEALTH SERVICES | Facility: HOSPITAL | Age: 54
End: 2019-10-24

## 2019-11-19 PROCEDURE — G0179 MD RECERTIFICATION HHA PT: HCPCS | Mod: ,,, | Performed by: INTERNAL MEDICINE

## 2019-11-19 PROCEDURE — G0179 PR HOME HEALTH MD RECERTIFICATION: ICD-10-PCS | Mod: ,,, | Performed by: INTERNAL MEDICINE

## 2019-12-05 ENCOUNTER — EXTERNAL HOME HEALTH (OUTPATIENT)
Dept: HOME HEALTH SERVICES | Facility: HOSPITAL | Age: 54
End: 2019-12-05
Payer: MEDICARE

## 2020-01-08 ENCOUNTER — PATIENT MESSAGE (OUTPATIENT)
Dept: FAMILY MEDICINE | Facility: CLINIC | Age: 55
End: 2020-01-08

## 2020-01-10 ENCOUNTER — TELEPHONE (OUTPATIENT)
Dept: HOME HEALTH SERVICES | Facility: HOSPITAL | Age: 55
End: 2020-01-10

## 2020-01-18 PROCEDURE — G0179 PR HOME HEALTH MD RECERTIFICATION: ICD-10-PCS | Mod: ,,, | Performed by: INTERNAL MEDICINE

## 2020-01-18 PROCEDURE — G0179 MD RECERTIFICATION HHA PT: HCPCS | Mod: ,,, | Performed by: INTERNAL MEDICINE

## 2020-01-23 ENCOUNTER — EXTERNAL HOME HEALTH (OUTPATIENT)
Dept: HOME HEALTH SERVICES | Facility: HOSPITAL | Age: 55
End: 2020-01-23
Payer: MEDICARE

## 2020-02-03 ENCOUNTER — TELEPHONE (OUTPATIENT)
Dept: HOME HEALTH SERVICES | Facility: HOSPITAL | Age: 55
End: 2020-02-03

## 2020-02-06 ENCOUNTER — EXTERNAL HOME HEALTH (OUTPATIENT)
Dept: HOME HEALTH SERVICES | Facility: HOSPITAL | Age: 55
End: 2020-02-06
Payer: MEDICARE

## 2020-02-21 ENCOUNTER — TELEPHONE (OUTPATIENT)
Dept: FAMILY MEDICINE | Facility: CLINIC | Age: 55
End: 2020-02-21

## 2020-02-21 DIAGNOSIS — T83.511D URINARY TRACT INFECTION ASSOCIATED WITH INDWELLING URETHRAL CATHETER, SUBSEQUENT ENCOUNTER: Primary | ICD-10-CM

## 2020-02-21 DIAGNOSIS — N39.0 URINARY TRACT INFECTION ASSOCIATED WITH INDWELLING URETHRAL CATHETER, SUBSEQUENT ENCOUNTER: Primary | ICD-10-CM

## 2020-02-21 RX ORDER — CEFDINIR 300 MG/1
300 CAPSULE ORAL 2 TIMES DAILY
Qty: 20 CAPSULE | Refills: 0 | Status: SHIPPED | OUTPATIENT
Start: 2020-02-21 | End: 2020-03-02

## 2020-02-21 NOTE — TELEPHONE ENCOUNTER
Yes, I reviewed his most recent urine culture.  Cefdinir will eradicate the Proteus that is growing in his urine.

## 2020-02-21 NOTE — TELEPHONE ENCOUNTER
Urinalysis looks like another urinary tract infection.  Will treat with cefdinir 300 mg by mouth twice daily for 10 days based on previous urine culture results.  Please get a urine culture this time as well.

## 2020-02-21 NOTE — TELEPHONE ENCOUNTER
----- Message from Elias Boyer sent at 2/21/2020 10:28 AM CST -----  Contact: Sabine  Type: Needs Medical Advice    Who Called:  Sabine with [facility]  Symptoms (please be specific):    How long has patient had these symptoms:    Pharmacy name and phone #:    Best Call Back Number: [phone]  Additional Information: stated that patient's urine results has been sent on 02/20 requesting a call back regarding what is order for the patient

## 2020-03-03 ENCOUNTER — TELEPHONE (OUTPATIENT)
Dept: HOME HEALTH SERVICES | Facility: HOSPITAL | Age: 55
End: 2020-03-03

## 2020-03-19 PROBLEM — S82.401A FRACTURE OF RIGHT TIBIA AND FIBULA: Status: ACTIVE | Noted: 2020-03-19

## 2020-03-19 PROBLEM — S82.201A FRACTURE OF RIGHT TIBIA AND FIBULA: Status: ACTIVE | Noted: 2020-03-19

## 2020-03-19 PROBLEM — R52 PAIN: Status: ACTIVE | Noted: 2020-03-19

## 2020-03-20 ENCOUNTER — TELEPHONE (OUTPATIENT)
Dept: FAMILY MEDICINE | Facility: CLINIC | Age: 55
End: 2020-03-20

## 2020-03-20 NOTE — TELEPHONE ENCOUNTER
----- Message from Liya Aj sent at 3/20/2020  3:49 PM CDT -----  Contact: Sabine with [facility]  Type: Needs Medical Advice    Who Called:  Sabine with [facility]  Best Call Back Number:  Additional Information: Calling to let the office know she will be faxing over the results of her patients urine culture because she does seem to have a UTI and they want to make sure Dr Seals is aware.

## 2020-03-20 NOTE — TELEPHONE ENCOUNTER
Spoke with Sabine with Home Health. Informed her the patient is admitted to the hospital and Dr. Seals is aware of the culture.

## 2020-03-21 PROBLEM — R00.0 SINUS TACHYCARDIA: Status: ACTIVE | Noted: 2020-03-21

## 2020-03-21 PROBLEM — Z97.8 CHRONIC INDWELLING FOLEY CATHETER: Status: ACTIVE | Noted: 2020-03-21

## 2020-03-23 PROBLEM — R07.9 CHEST PAIN: Status: ACTIVE | Noted: 2020-03-23

## 2020-03-25 PROBLEM — Z75.8 DISCHARGE PLANNING ISSUES: Status: ACTIVE | Noted: 2020-03-25

## 2020-03-25 PROBLEM — Z02.9 DISCHARGE PLANNING ISSUES: Status: ACTIVE | Noted: 2020-03-25

## 2020-03-27 PROBLEM — K59.00 CONSTIPATION: Status: ACTIVE | Noted: 2020-03-27

## 2020-03-28 PROBLEM — K59.00 CONSTIPATION: Status: RESOLVED | Noted: 2020-03-27 | Resolved: 2020-03-28

## 2020-03-31 PROBLEM — I25.118 CORONARY ARTERY DISEASE OF NATIVE ARTERY OF NATIVE HEART WITH STABLE ANGINA PECTORIS: Status: ACTIVE | Noted: 2020-03-23

## 2020-04-20 PROCEDURE — G0180 PR HOME HEALTH MD CERTIFICATION: ICD-10-PCS | Mod: ,,, | Performed by: INTERNAL MEDICINE

## 2020-04-20 PROCEDURE — G0180 MD CERTIFICATION HHA PATIENT: HCPCS | Mod: ,,, | Performed by: INTERNAL MEDICINE

## 2020-04-23 ENCOUNTER — DOCUMENT SCAN (OUTPATIENT)
Dept: HOME HEALTH SERVICES | Facility: HOSPITAL | Age: 55
End: 2020-04-23
Payer: MEDICARE

## 2020-04-29 ENCOUNTER — EXTERNAL HOME HEALTH (OUTPATIENT)
Dept: HOME HEALTH SERVICES | Facility: HOSPITAL | Age: 55
End: 2020-04-29
Payer: MEDICARE

## 2020-04-30 ENCOUNTER — TELEPHONE (OUTPATIENT)
Dept: ORTHOPEDICS | Facility: CLINIC | Age: 55
End: 2020-04-30

## 2020-04-30 ENCOUNTER — DOCUMENT SCAN (OUTPATIENT)
Dept: HOME HEALTH SERVICES | Facility: HOSPITAL | Age: 55
End: 2020-04-30
Payer: MEDICARE

## 2020-04-30 DIAGNOSIS — S82.201D CLOSED FRACTURE OF RIGHT TIBIA AND FIBULA WITH ROUTINE HEALING, SUBSEQUENT ENCOUNTER: Primary | ICD-10-CM

## 2020-04-30 DIAGNOSIS — S82.401D CLOSED FRACTURE OF RIGHT TIBIA AND FIBULA WITH ROUTINE HEALING, SUBSEQUENT ENCOUNTER: Primary | ICD-10-CM

## 2020-04-30 DIAGNOSIS — S72.492D OTHER CLOSED FRACTURE OF DISTAL END OF LEFT FEMUR WITH ROUTINE HEALING, SUBSEQUENT ENCOUNTER: ICD-10-CM

## 2020-04-30 NOTE — TELEPHONE ENCOUNTER
Attempted to call pt to ask COVID-19 and travel screening questions for upcoming appt and building entrance procedure. Unable to reach pt on the phone. Voicemail box full could not leave message. Sending My Chart message with screening questions. Thanks, Kendy

## 2020-05-01 ENCOUNTER — DOCUMENT SCAN (OUTPATIENT)
Dept: HOME HEALTH SERVICES | Facility: HOSPITAL | Age: 55
End: 2020-05-01
Payer: MEDICARE

## 2020-05-04 ENCOUNTER — TELEPHONE (OUTPATIENT)
Dept: FAMILY MEDICINE | Facility: CLINIC | Age: 55
End: 2020-05-04

## 2020-05-04 DIAGNOSIS — T83.511D URINARY TRACT INFECTION ASSOCIATED WITH INDWELLING URETHRAL CATHETER, SUBSEQUENT ENCOUNTER: Primary | ICD-10-CM

## 2020-05-04 DIAGNOSIS — N39.0 URINARY TRACT INFECTION ASSOCIATED WITH INDWELLING URETHRAL CATHETER, SUBSEQUENT ENCOUNTER: Primary | ICD-10-CM

## 2020-05-04 NOTE — TELEPHONE ENCOUNTER
Urinalysis from home health obtained on May 1st shows that he could have urinary tract infection however the urine culture is only growing [id number] colony-forming units of Proteus mirabilis.  Is this patient having symptoms or was this a routine screening urine sample?

## 2020-05-05 ENCOUNTER — DOCUMENT SCAN (OUTPATIENT)
Dept: HOME HEALTH SERVICES | Facility: HOSPITAL | Age: 55
End: 2020-05-05
Payer: MEDICARE

## 2020-05-05 ENCOUNTER — OFFICE VISIT (OUTPATIENT)
Dept: ORTHOPEDICS | Facility: CLINIC | Age: 55
End: 2020-05-05
Payer: MEDICARE

## 2020-05-05 ENCOUNTER — HOSPITAL ENCOUNTER (OUTPATIENT)
Dept: RADIOLOGY | Facility: HOSPITAL | Age: 55
Discharge: HOME | End: 2020-05-05
Attending: ORTHOPAEDIC SURGERY
Payer: MEDICARE

## 2020-05-05 VITALS
WEIGHT: 315 LBS | HEIGHT: 75 IN | DIASTOLIC BLOOD PRESSURE: 74 MMHG | TEMPERATURE: 98 F | SYSTOLIC BLOOD PRESSURE: 123 MMHG | HEART RATE: 69 BPM | BODY MASS INDEX: 39.17 KG/M2

## 2020-05-05 DIAGNOSIS — S72.492D OTHER CLOSED FRACTURE OF DISTAL END OF LEFT FEMUR WITH ROUTINE HEALING, SUBSEQUENT ENCOUNTER: ICD-10-CM

## 2020-05-05 DIAGNOSIS — S82.401D CLOSED FRACTURE OF RIGHT TIBIA AND FIBULA WITH ROUTINE HEALING, SUBSEQUENT ENCOUNTER: Primary | ICD-10-CM

## 2020-05-05 DIAGNOSIS — S82.201D CLOSED FRACTURE OF RIGHT TIBIA AND FIBULA WITH ROUTINE HEALING, SUBSEQUENT ENCOUNTER: Primary | ICD-10-CM

## 2020-05-05 DIAGNOSIS — S82.201D CLOSED FRACTURE OF RIGHT TIBIA AND FIBULA WITH ROUTINE HEALING, SUBSEQUENT ENCOUNTER: ICD-10-CM

## 2020-05-05 DIAGNOSIS — S82.401D CLOSED FRACTURE OF RIGHT TIBIA AND FIBULA WITH ROUTINE HEALING, SUBSEQUENT ENCOUNTER: ICD-10-CM

## 2020-05-05 PROCEDURE — 73590 XR TIBIA FIBULA 2 VIEW RIGHT: ICD-10-PCS | Mod: 26,RT,, | Performed by: RADIOLOGY

## 2020-05-05 PROCEDURE — 99999 PR PBB SHADOW E&M-EST. PATIENT-LVL III: ICD-10-PCS | Mod: PBBFAC,,, | Performed by: ORTHOPAEDIC SURGERY

## 2020-05-05 PROCEDURE — 99999 PR PBB SHADOW E&M-EST. PATIENT-LVL III: CPT | Mod: PBBFAC,,, | Performed by: ORTHOPAEDIC SURGERY

## 2020-05-05 PROCEDURE — 99024 POSTOP FOLLOW-UP VISIT: CPT | Mod: S$GLB,,, | Performed by: ORTHOPAEDIC SURGERY

## 2020-05-05 PROCEDURE — 73590 X-RAY EXAM OF LOWER LEG: CPT | Mod: TC,PO,RT

## 2020-05-05 PROCEDURE — 99213 OFFICE O/P EST LOW 20 MIN: CPT | Mod: PBBFAC,25,PN | Performed by: ORTHOPAEDIC SURGERY

## 2020-05-05 PROCEDURE — 73590 X-RAY EXAM OF LOWER LEG: CPT | Mod: 26,RT,, | Performed by: RADIOLOGY

## 2020-05-05 PROCEDURE — 99024 PR POST-OP FOLLOW-UP VISIT: ICD-10-PCS | Mod: S$GLB,,, | Performed by: ORTHOPAEDIC SURGERY

## 2020-05-05 NOTE — TELEPHONE ENCOUNTER
States patient was having symptoms--a lot of sediment and starting to get stopped up in suprapubic

## 2020-05-05 NOTE — PROGRESS NOTES
Chief Complaint   Patient presents with    Post-op Evaluation     3/20/20 IM sara right tibia, IM sara left femur       HPI:  55 y.o. returns to clinic today status post  right tibia ORIF and Left femur ORIF 6 weeks ago. Pain is mild. Patient is compliant most of the time with restrictions.     Bilateral lower extremity     Incisions healed. No erythema or fluctuance. Overall normal alignment. Mild point TTP about the fracture site. Decreased ROM due to stiffness. Compartments soft. Skin intact. NVI distally.        X-rays were performed today, personally reviewed by me and findings discussed with the patient.  2 views of the right tibia show hardware intact with no evidence of loosening, unable to perform femur    Closed fracture of right tibia and fibula with routine healing, subsequent encounter  -     Ambulatory referral/consult to Physical/Occupational Therapy; Future; Expected date: 05/12/2020  -     X-Ray Tibia Fibula 2 View Right; Future; Expected date: 05/05/2020    Other closed fracture of distal end of left femur with routine healing, subsequent encounter  -     Ambulatory referral/consult to Physical/Occupational Therapy; Future; Expected date: 05/12/2020  -     X-Ray Femur 2 View Left; Future; Expected date: 05/05/2020        WBAT.  RTC 4 weeks with repeat Xrays. Xray orders placed  at [facility] as patient requires Jacoby lift. Referral to [facility].   
room air

## 2020-05-05 NOTE — TELEPHONE ENCOUNTER
Let's have Home Health change his suprapubic catheter and then check another urinalysis and urine culture 1 or 2 days later.  Orders have been placed.

## 2020-05-08 ENCOUNTER — DOCUMENT SCAN (OUTPATIENT)
Dept: HOME HEALTH SERVICES | Facility: HOSPITAL | Age: 55
End: 2020-05-08
Payer: MEDICARE

## 2020-05-11 ENCOUNTER — DOCUMENT SCAN (OUTPATIENT)
Dept: HOME HEALTH SERVICES | Facility: HOSPITAL | Age: 55
End: 2020-05-11
Payer: MEDICARE

## 2020-05-13 ENCOUNTER — TELEPHONE (OUTPATIENT)
Dept: FAMILY MEDICINE | Facility: CLINIC | Age: 55
End: 2020-05-13

## 2020-05-13 DIAGNOSIS — N39.0 URINARY TRACT INFECTION ASSOCIATED WITH INDWELLING URETHRAL CATHETER, SUBSEQUENT ENCOUNTER: Primary | ICD-10-CM

## 2020-05-13 DIAGNOSIS — T83.511D URINARY TRACT INFECTION ASSOCIATED WITH INDWELLING URETHRAL CATHETER, SUBSEQUENT ENCOUNTER: Primary | ICD-10-CM

## 2020-05-13 RX ORDER — SULFAMETHOXAZOLE AND TRIMETHOPRIM 400; 80 MG/1; MG/1
1 TABLET ORAL DAILY
Qty: 30 TABLET | Refills: 3 | Status: SHIPPED | OUTPATIENT
Start: 2020-05-13 | End: 2022-01-19 | Stop reason: DRUGHIGH

## 2020-05-13 RX ORDER — CEFDINIR 300 MG/1
300 CAPSULE ORAL 2 TIMES DAILY
Qty: 20 CAPSULE | Refills: 0 | Status: SHIPPED | OUTPATIENT
Start: 2020-05-13 | End: 2020-05-23

## 2020-05-13 NOTE — TELEPHONE ENCOUNTER
----- Message from Princess RITESH Augustin sent at 5/13/2020 10:00 AM CDT -----  Contact: [facility]/ [facility]  Type: Needs Medical Advice  Who Called:  Maria Dolores / [facility]  Best Call Back Number:   Additional Information: Requesting a call in regard to signa dn symptoms  of UTI for about 3 weeks repeat UA sent over to the office. Patient is needing something to the assist him. Please call for UA results.

## 2020-05-13 NOTE — TELEPHONE ENCOUNTER
urine culture = Proteus mirabilis Sensitive to numerous antibiotics but Resistant to Imepenem. Will treat w/ Cefdinir 300 mg twice daily x 10 day and then start Bactrim SS 1 / day for prophylaxis, but studies have shown no benefit from prophylactic antibiotics in this setting, but I'm willing to try anything.

## 2020-05-13 NOTE — TELEPHONE ENCOUNTER
Called Maria Dolores with [facility], she faxed over UA/Culture result today. Wanting to know if you have reviewed yet. She stated his cath continues to get clogged and today it was changed for the second time in 2 weeks. She is requesting something for the current UTI. And then something prophylactically to help reduce future UTI's. Please advise.

## 2020-05-14 ENCOUNTER — DOCUMENT SCAN (OUTPATIENT)
Dept: HOME HEALTH SERVICES | Facility: HOSPITAL | Age: 55
End: 2020-05-14
Payer: MEDICARE

## 2020-05-22 ENCOUNTER — DOCUMENT SCAN (OUTPATIENT)
Dept: HOME HEALTH SERVICES | Facility: HOSPITAL | Age: 55
End: 2020-05-22
Payer: MEDICARE

## 2020-06-19 PROCEDURE — G0180 PR HOME HEALTH MD CERTIFICATION: ICD-10-PCS | Mod: ,,, | Performed by: INTERNAL MEDICINE

## 2020-06-19 PROCEDURE — G0180 MD CERTIFICATION HHA PATIENT: HCPCS | Mod: ,,, | Performed by: INTERNAL MEDICINE

## 2020-06-26 ENCOUNTER — EXTERNAL HOME HEALTH (OUTPATIENT)
Dept: HOME HEALTH SERVICES | Facility: HOSPITAL | Age: 55
End: 2020-06-26
Payer: MEDICARE

## 2020-07-14 ENCOUNTER — DOCUMENT SCAN (OUTPATIENT)
Dept: HOME HEALTH SERVICES | Facility: HOSPITAL | Age: 55
End: 2020-07-14
Payer: MEDICARE

## 2020-07-17 ENCOUNTER — DOCUMENT SCAN (OUTPATIENT)
Dept: HOME HEALTH SERVICES | Facility: HOSPITAL | Age: 55
End: 2020-07-17
Payer: MEDICARE

## 2020-08-03 ENCOUNTER — DOCUMENT SCAN (OUTPATIENT)
Dept: HOME HEALTH SERVICES | Facility: HOSPITAL | Age: 55
End: 2020-08-03
Payer: MEDICARE

## 2020-08-14 ENCOUNTER — DOCUMENT SCAN (OUTPATIENT)
Dept: HOME HEALTH SERVICES | Facility: HOSPITAL | Age: 55
End: 2020-08-14
Payer: MEDICARE

## 2020-08-18 PROCEDURE — G0179 MD RECERTIFICATION HHA PT: HCPCS | Mod: ,,, | Performed by: INTERNAL MEDICINE

## 2020-08-18 PROCEDURE — G0179 PR HOME HEALTH MD RECERTIFICATION: ICD-10-PCS | Mod: ,,, | Performed by: INTERNAL MEDICINE

## 2020-08-21 ENCOUNTER — EXTERNAL HOME HEALTH (OUTPATIENT)
Dept: HOME HEALTH SERVICES | Facility: HOSPITAL | Age: 55
End: 2020-08-21
Payer: MEDICARE

## 2020-09-14 DIAGNOSIS — N39.0 URINARY TRACT INFECTION ASSOCIATED WITH INDWELLING URETHRAL CATHETER, SUBSEQUENT ENCOUNTER: Primary | ICD-10-CM

## 2020-09-14 DIAGNOSIS — T83.511D URINARY TRACT INFECTION ASSOCIATED WITH INDWELLING URETHRAL CATHETER, SUBSEQUENT ENCOUNTER: Primary | ICD-10-CM

## 2020-09-14 RX ORDER — CEFDINIR 300 MG/1
300 CAPSULE ORAL 2 TIMES DAILY
Qty: 20 CAPSULE | Refills: 0 | Status: SHIPPED | OUTPATIENT
Start: 2020-09-14 | End: 2020-09-24

## 2020-09-15 ENCOUNTER — DOCUMENT SCAN (OUTPATIENT)
Dept: HOME HEALTH SERVICES | Facility: HOSPITAL | Age: 55
End: 2020-09-15
Payer: MEDICARE

## 2020-09-22 ENCOUNTER — DOCUMENT SCAN (OUTPATIENT)
Dept: HOME HEALTH SERVICES | Facility: HOSPITAL | Age: 55
End: 2020-09-22
Payer: MEDICARE

## 2020-10-01 ENCOUNTER — DOCUMENT SCAN (OUTPATIENT)
Dept: HOME HEALTH SERVICES | Facility: HOSPITAL | Age: 55
End: 2020-10-01
Payer: MEDICARE

## 2020-10-05 ENCOUNTER — PATIENT MESSAGE (OUTPATIENT)
Dept: ADMINISTRATIVE | Facility: HOSPITAL | Age: 55
End: 2020-10-05

## 2020-10-07 ENCOUNTER — DOCUMENT SCAN (OUTPATIENT)
Dept: HOME HEALTH SERVICES | Facility: HOSPITAL | Age: 55
End: 2020-10-07
Payer: MEDICARE

## 2020-10-15 ENCOUNTER — TELEPHONE (OUTPATIENT)
Dept: FAMILY MEDICINE | Facility: CLINIC | Age: 55
End: 2020-10-15

## 2020-10-15 NOTE — TELEPHONE ENCOUNTER
----- Message from Clifford Latham sent at 10/15/2020 12:11 PM CDT -----  Type: Needs Medical Advice    Who Called:  Miley ([facility])  Best Call Back Number: [phone]  Additional Information: Caller would like to alert the office they are collecting a urine specimen. Please call to advise. Thanks!

## 2020-10-17 PROCEDURE — G0179 MD RECERTIFICATION HHA PT: HCPCS | Mod: ,,, | Performed by: INTERNAL MEDICINE

## 2020-10-17 PROCEDURE — G0179 PR HOME HEALTH MD RECERTIFICATION: ICD-10-PCS | Mod: ,,, | Performed by: INTERNAL MEDICINE

## 2020-10-19 ENCOUNTER — TELEPHONE (OUTPATIENT)
Dept: FAMILY MEDICINE | Facility: CLINIC | Age: 55
End: 2020-10-19

## 2020-10-19 DIAGNOSIS — R26.2 DIFFICULTY WALKING: Primary | ICD-10-CM

## 2020-10-19 DIAGNOSIS — T83.511D URINARY TRACT INFECTION ASSOCIATED WITH INDWELLING URETHRAL CATHETER, SUBSEQUENT ENCOUNTER: ICD-10-CM

## 2020-10-19 DIAGNOSIS — N39.0 URINARY TRACT INFECTION ASSOCIATED WITH INDWELLING URETHRAL CATHETER, SUBSEQUENT ENCOUNTER: ICD-10-CM

## 2020-10-19 RX ORDER — CIPROFLOXACIN 500 MG/1
500 TABLET ORAL EVERY 12 HOURS
Qty: 20 TABLET | Refills: 0 | Status: SHIPPED | OUTPATIENT
Start: 2020-10-19 | End: 2020-12-11 | Stop reason: SDUPTHER

## 2020-10-19 NOTE — TELEPHONE ENCOUNTER
Callback to , advised he was having odor and sediment, spoke with wife and states cath was clogged.  Advised to change cath today and that antibiotics were sent to [facility]

## 2020-10-19 NOTE — TELEPHONE ENCOUNTER
Spoke to patient's wife, and the patient's suprapubic catheter was clogged and his urine had a dark color and strong odor.  Urine culture is growing Enterobacter and Enterococcus which have variable sensitivities I will treat with Cipro as monotherapy instruct home health changes suprapubic catheter today.  Please instruct the patient to stop his Bactrim moist taking the Cipro and also that the Enterobacter is resistant to the Bactrim, so we may consider stopping it altogether.

## 2020-10-20 ENCOUNTER — DOCUMENT SCAN (OUTPATIENT)
Dept: HOME HEALTH SERVICES | Facility: HOSPITAL | Age: 55
End: 2020-10-20
Payer: MEDICARE

## 2020-10-21 ENCOUNTER — EXTERNAL HOME HEALTH (OUTPATIENT)
Dept: HOME HEALTH SERVICES | Facility: HOSPITAL | Age: 55
End: 2020-10-21
Payer: MEDICARE

## 2020-10-30 ENCOUNTER — PATIENT MESSAGE (OUTPATIENT)
Dept: ORTHOPEDICS | Facility: CLINIC | Age: 55
End: 2020-10-30

## 2020-11-05 ENCOUNTER — PATIENT MESSAGE (OUTPATIENT)
Dept: FAMILY MEDICINE | Facility: CLINIC | Age: 55
End: 2020-11-05

## 2020-11-17 ENCOUNTER — TELEPHONE (OUTPATIENT)
Dept: FAMILY MEDICINE | Facility: CLINIC | Age: 55
End: 2020-11-17

## 2020-11-17 ENCOUNTER — DOCUMENT SCAN (OUTPATIENT)
Dept: HOME HEALTH SERVICES | Facility: HOSPITAL | Age: 55
End: 2020-11-17
Payer: MEDICARE

## 2020-11-30 ENCOUNTER — DOCUMENT SCAN (OUTPATIENT)
Dept: HOME HEALTH SERVICES | Facility: HOSPITAL | Age: 55
End: 2020-11-30
Payer: MEDICARE

## 2020-12-08 ENCOUNTER — DOCUMENT SCAN (OUTPATIENT)
Dept: HOME HEALTH SERVICES | Facility: HOSPITAL | Age: 55
End: 2020-12-08
Payer: MEDICARE

## 2020-12-10 ENCOUNTER — TELEPHONE (OUTPATIENT)
Dept: FAMILY MEDICINE | Facility: CLINIC | Age: 55
End: 2020-12-10

## 2020-12-10 DIAGNOSIS — Z97.8 CHRONIC INDWELLING FOLEY CATHETER: ICD-10-CM

## 2020-12-10 DIAGNOSIS — N39.0 URINARY TRACT INFECTION ASSOCIATED WITH INDWELLING URETHRAL CATHETER, SUBSEQUENT ENCOUNTER: Primary | ICD-10-CM

## 2020-12-10 DIAGNOSIS — T83.511D URINARY TRACT INFECTION ASSOCIATED WITH INDWELLING URETHRAL CATHETER, SUBSEQUENT ENCOUNTER: Primary | ICD-10-CM

## 2020-12-10 NOTE — TELEPHONE ENCOUNTER
----- Message from Hernan Liu sent at 12/10/2020 12:29 PM CST -----  Regarding: Pt advice  Contact: Nurse Trent  Type:  Patient Returning Call    Who Called:  Miley [facility]  Does the patient know what this is regarding?:  pt  and now 110  Best Call Back Number:    Additional Information:  Please follow up with nurse

## 2020-12-10 NOTE — TELEPHONE ENCOUNTER
Spoke with Miley JULIAN nurse. Patient's HR was  120 at her visit today. She rechecked it before she left and  HR went down to  110. She said it is normally in the 70's . He was  Asymptomatic. Please advise

## 2020-12-11 RX ORDER — CIPROFLOXACIN 500 MG/1
500 TABLET ORAL EVERY 12 HOURS
Qty: 20 TABLET | Refills: 0 | Status: SHIPPED | OUTPATIENT
Start: 2020-12-11 | End: 2020-12-21

## 2020-12-11 NOTE — TELEPHONE ENCOUNTER
I ordered a urinalysis with reflex to urine culture through home health.  Please get that today.  I also ordered a prescription for ciprofloxacin.  Please start that after the urinalysis has been collected.  Please stop the Bactrim while you're on the Cipro.

## 2020-12-11 NOTE — TELEPHONE ENCOUNTER
"Spoke with Marilyn, states that pts urine "looks aweful" and will "go back out on Monday to evaluate", advised that PCP will be notified and poss urine culture for urine, verbalized understanding order pended  "

## 2020-12-11 NOTE — TELEPHONE ENCOUNTER
Spoke with Miley at [facility] to give VO to do a UA with culture today. Stop bactrim and begin cipro after his ua is collected. Verbalized understanding

## 2020-12-16 PROCEDURE — G0180 PR HOME HEALTH MD CERTIFICATION: ICD-10-PCS | Mod: ,,, | Performed by: INTERNAL MEDICINE

## 2020-12-16 PROCEDURE — G0180 MD CERTIFICATION HHA PATIENT: HCPCS | Mod: ,,, | Performed by: INTERNAL MEDICINE

## 2020-12-21 ENCOUNTER — TELEPHONE (OUTPATIENT)
Dept: FAMILY MEDICINE | Facility: CLINIC | Age: 55
End: 2020-12-21

## 2020-12-21 DIAGNOSIS — N13.9 URINARY (TRACT) OBSTRUCTION: Primary | ICD-10-CM

## 2020-12-21 NOTE — TELEPHONE ENCOUNTER
----- Message from Bharati Arguello sent at 12/21/2020 12:16 PM CST -----  Contact: Miley from [facility]  Miley requesting call back to speak to nurse about pt's urine culture. She can be reached at [phone]

## 2020-12-22 ENCOUNTER — PATIENT MESSAGE (OUTPATIENT)
Dept: FAMILY MEDICINE | Facility: CLINIC | Age: 55
End: 2020-12-22

## 2020-12-22 RX ORDER — AMOXICILLIN AND CLAVULANATE POTASSIUM 875; 125 MG/1; MG/1
1 TABLET, FILM COATED ORAL EVERY 12 HOURS
Qty: 14 TABLET | Refills: 0 | Status: SHIPPED | OUTPATIENT
Start: 2020-12-22 | End: 2020-12-29

## 2020-12-29 ENCOUNTER — EXTERNAL HOME HEALTH (OUTPATIENT)
Dept: HOME HEALTH SERVICES | Facility: HOSPITAL | Age: 55
End: 2020-12-29
Payer: MEDICARE

## 2021-01-04 ENCOUNTER — PATIENT MESSAGE (OUTPATIENT)
Dept: ADMINISTRATIVE | Facility: HOSPITAL | Age: 56
End: 2021-01-04

## 2021-01-04 ENCOUNTER — DOCUMENT SCAN (OUTPATIENT)
Dept: HOME HEALTH SERVICES | Facility: HOSPITAL | Age: 56
End: 2021-01-04
Payer: MEDICARE

## 2021-01-08 ENCOUNTER — DOCUMENT SCAN (OUTPATIENT)
Dept: HOME HEALTH SERVICES | Facility: HOSPITAL | Age: 56
End: 2021-01-08
Payer: MEDICARE

## 2021-01-22 ENCOUNTER — DOCUMENT SCAN (OUTPATIENT)
Dept: HOME HEALTH SERVICES | Facility: HOSPITAL | Age: 56
End: 2021-01-22
Payer: MEDICARE

## 2021-01-25 ENCOUNTER — TELEPHONE (OUTPATIENT)
Dept: FAMILY MEDICINE | Facility: CLINIC | Age: 56
End: 2021-01-25

## 2021-01-25 DIAGNOSIS — N39.0 URINARY TRACT INFECTION ASSOCIATED WITH INDWELLING URETHRAL CATHETER, SUBSEQUENT ENCOUNTER: Primary | ICD-10-CM

## 2021-01-25 DIAGNOSIS — T83.511D URINARY TRACT INFECTION ASSOCIATED WITH INDWELLING URETHRAL CATHETER, SUBSEQUENT ENCOUNTER: Primary | ICD-10-CM

## 2021-01-25 RX ORDER — CIPROFLOXACIN 500 MG/1
500 TABLET ORAL EVERY 12 HOURS
Qty: 14 TABLET | Refills: 0 | Status: SHIPPED | OUTPATIENT
Start: 2021-01-25 | End: 2021-02-01

## 2021-01-29 ENCOUNTER — DOCUMENT SCAN (OUTPATIENT)
Dept: HOME HEALTH SERVICES | Facility: HOSPITAL | Age: 56
End: 2021-01-29
Payer: MEDICARE

## 2021-02-14 PROCEDURE — G0179 MD RECERTIFICATION HHA PT: HCPCS | Mod: ,,, | Performed by: INTERNAL MEDICINE

## 2021-02-14 PROCEDURE — G0179 PR HOME HEALTH MD RECERTIFICATION: ICD-10-PCS | Mod: ,,, | Performed by: INTERNAL MEDICINE

## 2021-02-17 ENCOUNTER — EXTERNAL HOME HEALTH (OUTPATIENT)
Dept: HOME HEALTH SERVICES | Facility: HOSPITAL | Age: 56
End: 2021-02-17
Payer: MEDICARE

## 2021-03-02 ENCOUNTER — DOCUMENT SCAN (OUTPATIENT)
Dept: HOME HEALTH SERVICES | Facility: HOSPITAL | Age: 56
End: 2021-03-02
Payer: MEDICARE

## 2021-03-10 ENCOUNTER — PATIENT MESSAGE (OUTPATIENT)
Dept: FAMILY MEDICINE | Facility: CLINIC | Age: 56
End: 2021-03-10

## 2021-03-11 ENCOUNTER — OFFICE VISIT (OUTPATIENT)
Dept: FAMILY MEDICINE | Facility: CLINIC | Age: 56
End: 2021-03-11
Payer: MEDICARE

## 2021-03-11 DIAGNOSIS — I25.118 CORONARY ARTERY DISEASE OF NATIVE ARTERY OF NATIVE HEART WITH STABLE ANGINA PECTORIS: ICD-10-CM

## 2021-03-11 DIAGNOSIS — R53.81 DEBILITY: ICD-10-CM

## 2021-03-11 DIAGNOSIS — E78.2 MIXED HYPERLIPIDEMIA: Chronic | ICD-10-CM

## 2021-03-11 DIAGNOSIS — L89.154 PRESSURE INJURY OF SACRAL REGION, STAGE 4: Primary | ICD-10-CM

## 2021-03-11 DIAGNOSIS — I67.9 CEREBRAL VASCULAR DISEASE: ICD-10-CM

## 2021-03-11 DIAGNOSIS — Z93.59 SUPRAPUBIC CATHETER: ICD-10-CM

## 2021-03-11 DIAGNOSIS — E66.01 MORBID OBESITY: ICD-10-CM

## 2021-03-11 DIAGNOSIS — G81.94 LEFT HEMIPARESIS: ICD-10-CM

## 2021-03-11 PROBLEM — K55.039: Status: RESOLVED | Noted: 2019-06-28 | Resolved: 2021-03-11

## 2021-03-11 PROBLEM — R52 PAIN: Status: RESOLVED | Noted: 2020-03-19 | Resolved: 2021-03-11

## 2021-03-11 PROBLEM — Z02.9 DISCHARGE PLANNING ISSUES: Status: RESOLVED | Noted: 2020-03-25 | Resolved: 2021-03-11

## 2021-03-11 PROBLEM — Z75.8 DISCHARGE PLANNING ISSUES: Status: RESOLVED | Noted: 2020-03-25 | Resolved: 2021-03-11

## 2021-03-11 PROCEDURE — 99214 OFFICE O/P EST MOD 30 MIN: CPT | Mod: 95,,, | Performed by: PHYSICIAN ASSISTANT

## 2021-03-11 PROCEDURE — 99214 PR OFFICE/OUTPT VISIT, EST, LEVL IV, 30-39 MIN: ICD-10-PCS | Mod: 95,,, | Performed by: PHYSICIAN ASSISTANT

## 2021-03-11 RX ORDER — OMEPRAZOLE 20 MG/1
20 CAPSULE, DELAYED RELEASE ORAL DAILY
COMMUNITY

## 2021-03-16 ENCOUNTER — DOCUMENT SCAN (OUTPATIENT)
Dept: HOME HEALTH SERVICES | Facility: HOSPITAL | Age: 56
End: 2021-03-16
Payer: MEDICARE

## 2021-04-15 PROCEDURE — G0179 PR HOME HEALTH MD RECERTIFICATION: ICD-10-PCS | Mod: ,,, | Performed by: INTERNAL MEDICINE

## 2021-04-15 PROCEDURE — G0179 MD RECERTIFICATION HHA PT: HCPCS | Mod: ,,, | Performed by: INTERNAL MEDICINE

## 2021-04-16 ENCOUNTER — EXTERNAL HOME HEALTH (OUTPATIENT)
Dept: HOME HEALTH SERVICES | Facility: HOSPITAL | Age: 56
End: 2021-04-16
Payer: MEDICARE

## 2021-04-29 ENCOUNTER — PATIENT MESSAGE (OUTPATIENT)
Dept: RESEARCH | Facility: HOSPITAL | Age: 56
End: 2021-04-29

## 2021-05-11 ENCOUNTER — PATIENT MESSAGE (OUTPATIENT)
Dept: FAMILY MEDICINE | Facility: CLINIC | Age: 56
End: 2021-05-11

## 2021-05-11 DIAGNOSIS — T83.511D URINARY TRACT INFECTION ASSOCIATED WITH INDWELLING URETHRAL CATHETER, SUBSEQUENT ENCOUNTER: Primary | ICD-10-CM

## 2021-05-11 DIAGNOSIS — N39.0 URINARY TRACT INFECTION ASSOCIATED WITH INDWELLING URETHRAL CATHETER, SUBSEQUENT ENCOUNTER: Primary | ICD-10-CM

## 2021-05-11 RX ORDER — CEFDINIR 300 MG/1
300 CAPSULE ORAL 2 TIMES DAILY
Qty: 20 CAPSULE | Refills: 0 | Status: SHIPPED | OUTPATIENT
Start: 2021-05-11 | End: 2021-05-15 | Stop reason: DRUGHIGH

## 2021-05-15 ENCOUNTER — PATIENT MESSAGE (OUTPATIENT)
Dept: FAMILY MEDICINE | Facility: CLINIC | Age: 56
End: 2021-05-15

## 2021-05-15 DIAGNOSIS — B95.2 ENTEROCOCCUS UTI: Primary | ICD-10-CM

## 2021-05-15 DIAGNOSIS — N39.0 ENTEROCOCCUS UTI: Primary | ICD-10-CM

## 2021-05-15 RX ORDER — AMOXICILLIN 500 MG/1
500 CAPSULE ORAL EVERY 8 HOURS
Qty: 21 CAPSULE | Refills: 0 | Status: SHIPPED | OUTPATIENT
Start: 2021-05-15 | End: 2021-05-22

## 2021-05-19 ENCOUNTER — DOCUMENT SCAN (OUTPATIENT)
Dept: HOME HEALTH SERVICES | Facility: HOSPITAL | Age: 56
End: 2021-05-19
Payer: MEDICARE

## 2021-06-11 ENCOUNTER — TELEPHONE (OUTPATIENT)
Dept: FAMILY MEDICINE | Facility: CLINIC | Age: 56
End: 2021-06-11

## 2021-06-12 ENCOUNTER — PATIENT MESSAGE (OUTPATIENT)
Dept: FAMILY MEDICINE | Facility: CLINIC | Age: 56
End: 2021-06-12

## 2021-06-12 DIAGNOSIS — N39.0 URINARY TRACT INFECTION ASSOCIATED WITH INDWELLING URETHRAL CATHETER, SUBSEQUENT ENCOUNTER: Primary | ICD-10-CM

## 2021-06-12 DIAGNOSIS — T83.511D URINARY TRACT INFECTION ASSOCIATED WITH INDWELLING URETHRAL CATHETER, SUBSEQUENT ENCOUNTER: Primary | ICD-10-CM

## 2021-06-12 RX ORDER — AMOXICILLIN 875 MG/1
875 TABLET, FILM COATED ORAL EVERY 12 HOURS
Qty: 14 TABLET | Refills: 0 | Status: SHIPPED | OUTPATIENT
Start: 2021-06-12 | End: 2021-06-19

## 2021-06-14 PROCEDURE — G0179 PR HOME HEALTH MD RECERTIFICATION: ICD-10-PCS | Mod: ,,, | Performed by: INTERNAL MEDICINE

## 2021-06-14 PROCEDURE — G0179 MD RECERTIFICATION HHA PT: HCPCS | Mod: ,,, | Performed by: INTERNAL MEDICINE

## 2021-06-15 ENCOUNTER — EXTERNAL HOME HEALTH (OUTPATIENT)
Dept: HOME HEALTH SERVICES | Facility: HOSPITAL | Age: 56
End: 2021-06-15
Payer: MEDICARE

## 2021-07-13 ENCOUNTER — PATIENT MESSAGE (OUTPATIENT)
Dept: FAMILY MEDICINE | Facility: CLINIC | Age: 56
End: 2021-07-13

## 2021-07-14 ENCOUNTER — OFFICE VISIT (OUTPATIENT)
Dept: FAMILY MEDICINE | Facility: CLINIC | Age: 56
End: 2021-07-14
Payer: MEDICARE

## 2021-07-14 DIAGNOSIS — T83.510D: ICD-10-CM

## 2021-07-14 DIAGNOSIS — L89.140: Primary | ICD-10-CM

## 2021-07-14 DIAGNOSIS — K59.01 SLOW TRANSIT CONSTIPATION: ICD-10-CM

## 2021-07-14 DIAGNOSIS — Z46.6 FITTING AND ADJUSTMENT OF URINARY DEVICE: ICD-10-CM

## 2021-07-14 PROCEDURE — 99213 OFFICE O/P EST LOW 20 MIN: CPT | Mod: 95,,, | Performed by: NURSE PRACTITIONER

## 2021-07-14 PROCEDURE — 99213 PR OFFICE/OUTPT VISIT, EST, LEVL III, 20-29 MIN: ICD-10-PCS | Mod: 95,,, | Performed by: NURSE PRACTITIONER

## 2021-07-18 ENCOUNTER — DOCUMENT SCAN (OUTPATIENT)
Dept: HOME HEALTH SERVICES | Facility: HOSPITAL | Age: 56
End: 2021-07-18
Payer: MEDICARE

## 2021-08-13 ENCOUNTER — EXTERNAL HOME HEALTH (OUTPATIENT)
Dept: HOME HEALTH SERVICES | Facility: HOSPITAL | Age: 56
End: 2021-08-13
Payer: MEDICARE

## 2021-08-13 PROCEDURE — G0179 PR HOME HEALTH MD RECERTIFICATION: ICD-10-PCS | Mod: ,,, | Performed by: INTERNAL MEDICINE

## 2021-08-13 PROCEDURE — G0179 MD RECERTIFICATION HHA PT: HCPCS | Mod: ,,, | Performed by: INTERNAL MEDICINE

## 2021-08-19 ENCOUNTER — DOCUMENT SCAN (OUTPATIENT)
Dept: HOME HEALTH SERVICES | Facility: HOSPITAL | Age: 56
End: 2021-08-19
Payer: MEDICARE

## 2021-09-14 ENCOUNTER — DOCUMENT SCAN (OUTPATIENT)
Dept: HOME HEALTH SERVICES | Facility: HOSPITAL | Age: 56
End: 2021-09-14
Payer: MEDICARE

## 2021-09-20 ENCOUNTER — DOCUMENT SCAN (OUTPATIENT)
Dept: HOME HEALTH SERVICES | Facility: HOSPITAL | Age: 56
End: 2021-09-20
Payer: MEDICARE

## 2021-10-12 PROCEDURE — G0180 PR HOME HEALTH MD CERTIFICATION: ICD-10-PCS | Mod: ,,, | Performed by: INTERNAL MEDICINE

## 2021-10-12 PROCEDURE — G0180 MD CERTIFICATION HHA PATIENT: HCPCS | Mod: ,,, | Performed by: INTERNAL MEDICINE

## 2021-10-14 ENCOUNTER — EXTERNAL HOME HEALTH (OUTPATIENT)
Dept: HOME HEALTH SERVICES | Facility: HOSPITAL | Age: 56
End: 2021-10-14
Payer: MEDICARE

## 2021-10-14 ENCOUNTER — DOCUMENT SCAN (OUTPATIENT)
Dept: HOME HEALTH SERVICES | Facility: HOSPITAL | Age: 56
End: 2021-10-14
Payer: MEDICARE

## 2021-10-19 ENCOUNTER — PATIENT MESSAGE (OUTPATIENT)
Dept: FAMILY MEDICINE | Facility: CLINIC | Age: 56
End: 2021-10-19
Payer: MEDICARE

## 2021-10-19 DIAGNOSIS — E78.2 MIXED HYPERLIPIDEMIA: Primary | ICD-10-CM

## 2021-10-19 DIAGNOSIS — Z12.5 PROSTATE CANCER SCREENING: ICD-10-CM

## 2021-11-12 ENCOUNTER — DOCUMENT SCAN (OUTPATIENT)
Dept: HOME HEALTH SERVICES | Facility: HOSPITAL | Age: 56
End: 2021-11-12
Payer: MEDICARE

## 2021-11-22 ENCOUNTER — DOCUMENT SCAN (OUTPATIENT)
Dept: HOME HEALTH SERVICES | Facility: HOSPITAL | Age: 56
End: 2021-11-22
Payer: MEDICARE

## 2021-12-07 ENCOUNTER — OFFICE VISIT (OUTPATIENT)
Dept: FAMILY MEDICINE | Facility: CLINIC | Age: 56
End: 2021-12-07
Payer: MEDICARE

## 2021-12-07 VITALS
SYSTOLIC BLOOD PRESSURE: 110 MMHG | OXYGEN SATURATION: 95 % | BODY MASS INDEX: 31.71 KG/M2 | HEIGHT: 75 IN | HEART RATE: 67 BPM | WEIGHT: 255 LBS | DIASTOLIC BLOOD PRESSURE: 64 MMHG

## 2021-12-07 DIAGNOSIS — L89.140: ICD-10-CM

## 2021-12-07 DIAGNOSIS — Z00.00 WELLNESS EXAMINATION: Primary | ICD-10-CM

## 2021-12-07 DIAGNOSIS — K59.01 SLOW TRANSIT CONSTIPATION: ICD-10-CM

## 2021-12-07 DIAGNOSIS — E78.2 MIXED HYPERLIPIDEMIA: ICD-10-CM

## 2021-12-07 DIAGNOSIS — Z97.8 CHRONIC INDWELLING FOLEY CATHETER: ICD-10-CM

## 2021-12-07 DIAGNOSIS — I67.9 CEREBRAL VASCULAR DISEASE: ICD-10-CM

## 2021-12-07 DIAGNOSIS — E66.01 MORBID OBESITY: ICD-10-CM

## 2021-12-07 DIAGNOSIS — R26.2 DIFFICULTY WALKING: ICD-10-CM

## 2021-12-07 PROCEDURE — 90686 FLU VACCINE (QUAD) GREATER THAN OR EQUAL TO 3YO PRESERVATIVE FREE IM: ICD-10-PCS | Mod: S$GLB,,, | Performed by: INTERNAL MEDICINE

## 2021-12-07 PROCEDURE — 99999 PR PBB SHADOW E&M-EST. PATIENT-LVL IV: ICD-10-PCS | Mod: PBBFAC,,, | Performed by: INTERNAL MEDICINE

## 2021-12-07 PROCEDURE — G0008 FLU VACCINE (QUAD) GREATER THAN OR EQUAL TO 3YO PRESERVATIVE FREE IM: ICD-10-PCS | Mod: S$GLB,,, | Performed by: INTERNAL MEDICINE

## 2021-12-07 PROCEDURE — 99214 OFFICE O/P EST MOD 30 MIN: CPT | Mod: 25,S$GLB,, | Performed by: INTERNAL MEDICINE

## 2021-12-07 PROCEDURE — G0008 ADMIN INFLUENZA VIRUS VAC: HCPCS | Mod: S$GLB,,, | Performed by: INTERNAL MEDICINE

## 2021-12-07 PROCEDURE — 99214 PR OFFICE/OUTPT VISIT, EST, LEVL IV, 30-39 MIN: ICD-10-PCS | Mod: 25,S$GLB,, | Performed by: INTERNAL MEDICINE

## 2021-12-07 PROCEDURE — 90686 IIV4 VACC NO PRSV 0.5 ML IM: CPT | Mod: S$GLB,,, | Performed by: INTERNAL MEDICINE

## 2021-12-07 PROCEDURE — 99999 PR PBB SHADOW E&M-EST. PATIENT-LVL IV: CPT | Mod: PBBFAC,,, | Performed by: INTERNAL MEDICINE

## 2021-12-10 ENCOUNTER — DOCUMENT SCAN (OUTPATIENT)
Dept: HOME HEALTH SERVICES | Facility: HOSPITAL | Age: 56
End: 2021-12-10
Payer: MEDICARE

## 2021-12-11 PROCEDURE — G0179 MD RECERTIFICATION HHA PT: HCPCS | Mod: ,,, | Performed by: INTERNAL MEDICINE

## 2021-12-11 PROCEDURE — G0179 PR HOME HEALTH MD RECERTIFICATION: ICD-10-PCS | Mod: ,,, | Performed by: INTERNAL MEDICINE

## 2021-12-16 ENCOUNTER — EXTERNAL HOME HEALTH (OUTPATIENT)
Dept: HOME HEALTH SERVICES | Facility: HOSPITAL | Age: 56
End: 2021-12-16
Payer: MEDICARE

## 2022-01-11 ENCOUNTER — DOCUMENT SCAN (OUTPATIENT)
Dept: HOME HEALTH SERVICES | Facility: HOSPITAL | Age: 57
End: 2022-01-11
Payer: MEDICARE

## 2022-01-19 ENCOUNTER — OFFICE VISIT (OUTPATIENT)
Dept: FAMILY MEDICINE | Facility: CLINIC | Age: 57
End: 2022-01-19
Payer: MEDICARE

## 2022-01-19 ENCOUNTER — PATIENT MESSAGE (OUTPATIENT)
Dept: FAMILY MEDICINE | Facility: CLINIC | Age: 57
End: 2022-01-19

## 2022-01-19 DIAGNOSIS — G81.94 LEFT HEMIPARESIS: ICD-10-CM

## 2022-01-19 DIAGNOSIS — R39.89 BLADDER PAIN: ICD-10-CM

## 2022-01-19 DIAGNOSIS — E66.01 MORBID OBESITY: ICD-10-CM

## 2022-01-19 DIAGNOSIS — L89.140: ICD-10-CM

## 2022-01-19 DIAGNOSIS — Z93.59 SUPRAPUBIC CATHETER: ICD-10-CM

## 2022-01-19 DIAGNOSIS — R05.9 COUGH: Primary | ICD-10-CM

## 2022-01-19 DIAGNOSIS — I25.118 CORONARY ARTERY DISEASE OF NATIVE ARTERY OF NATIVE HEART WITH STABLE ANGINA PECTORIS: ICD-10-CM

## 2022-01-19 PROCEDURE — 1160F RVW MEDS BY RX/DR IN RCRD: CPT | Mod: CPTII,95,, | Performed by: INTERNAL MEDICINE

## 2022-01-19 PROCEDURE — 1160F PR REVIEW ALL MEDS BY PRESCRIBER/CLIN PHARMACIST DOCUMENTED: ICD-10-PCS | Mod: CPTII,95,, | Performed by: INTERNAL MEDICINE

## 2022-01-19 PROCEDURE — 99214 OFFICE O/P EST MOD 30 MIN: CPT | Mod: 95,,, | Performed by: INTERNAL MEDICINE

## 2022-01-19 PROCEDURE — 1159F MED LIST DOCD IN RCRD: CPT | Mod: CPTII,95,, | Performed by: INTERNAL MEDICINE

## 2022-01-19 PROCEDURE — 99214 PR OFFICE/OUTPT VISIT, EST, LEVL IV, 30-39 MIN: ICD-10-PCS | Mod: 95,,, | Performed by: INTERNAL MEDICINE

## 2022-01-19 PROCEDURE — 1159F PR MEDICATION LIST DOCUMENTED IN MEDICAL RECORD: ICD-10-PCS | Mod: CPTII,95,, | Performed by: INTERNAL MEDICINE

## 2022-01-19 RX ORDER — ALBUTEROL SULFATE 90 UG/1
2 AEROSOL, METERED RESPIRATORY (INHALATION) EVERY 4 HOURS PRN
Qty: 18 G | Refills: 3 | Status: SHIPPED | OUTPATIENT
Start: 2022-01-19 | End: 2022-11-29 | Stop reason: SDUPTHER

## 2022-01-19 RX ORDER — ALBUTEROL SULFATE 90 UG/1
2 AEROSOL, METERED RESPIRATORY (INHALATION) EVERY 6 HOURS PRN
Qty: 18 G | Refills: 0 | Status: SHIPPED | OUTPATIENT
Start: 2022-01-19 | End: 2022-01-19 | Stop reason: DRUGHIGH

## 2022-01-19 RX ORDER — SULFAMETHOXAZOLE AND TRIMETHOPRIM 800; 160 MG/1; MG/1
1 TABLET ORAL 2 TIMES DAILY
Qty: 20 TABLET | Refills: 0 | Status: SHIPPED | OUTPATIENT
Start: 2022-01-19 | End: 2022-01-29

## 2022-01-19 NOTE — PROGRESS NOTES
Patient ID: Nadeem Alegre     Chief Complaint: Cough     The patient location is: [place]   The chief complaint leading to consultation is: Cough     Visit type: audiovisual    Face to Face time with patient: 15 mins  20 minutes of total time spent on the encounter, which includes face to face time and non-face to face time preparing to see the patient (eg, review of tests), Obtaining and/or reviewing separately obtained history, Documenting clinical information in the electronic or other health record, Independently interpreting results (not separately reported) and communicating results to the patient/family/caregiver, or Care coordination (not separately reported).         Each patient to whom he or she provides medical services by telemedicine is:  (1) informed of the relationship between the physician and patient and the respective role of any other health care provider with respect to management of the patient; and (2) notified that he or she may decline to receive medical services by telemedicine and may withdraw from such care at any time.    Notes:       HPI:  The patient complains of a cough for the past few days productive of green sputum.  He has also had a little shortness of breath but denies any fevers chills or loss of taste and smell.  His wife at COVID last week and I think it is very possible that he could have a case of it as well.  When asked if he could be COVID he denies it is not.  His own health nurse said she heard some crackles in his lungs today.  I am going to give him a albuterol inhaler to use every few hours as well as treated with Bactrim for presumed upper respiratory tract infection.  He also says that he is having some suprapubic pain and he just had his suprapubic catheter changed yesterday.  There is some sediment in his urine some negative urinalysis and urine culture as well as treated with Bactrim to hopefully treat a suspected urinary tract infection.  If his bladder  pain persists I would treat him with oxybutynin for suspected bladder spasms.    Review of Systems   Constitutional: Negative.  Negative for chills and fever.   HENT: Positive for postnasal drip and sore throat. Negative for ear pain and rhinorrhea.    Eyes: Negative.    Respiratory: Positive for cough, shortness of breath and wheezing.    Cardiovascular: Negative.  Negative for chest pain.   Gastrointestinal: Negative.    Endocrine: Negative.    Genitourinary: Negative.    Musculoskeletal: Positive for myalgias.   Skin: Negative.  Negative for rash.   Allergic/Immunologic: Negative.  Negative for environmental allergies.   Neurological: Negative.  Negative for headaches.   Hematological: Negative.    Psychiatric/Behavioral: Negative.           Objective:      Physical Exam   Physical Exam  Constitutional:       Appearance: Normal appearance.   HENT:      Head: Normocephalic and atraumatic.   Pulmonary:      Effort: Pulmonary effort is normal.   Musculoskeletal:      Comments: Ambulates in wheelchiar      Neurological:      General: No focal deficit present.      Mental Status: He is alert. Mental status is at baseline.   Psychiatric:         Mood and Affect: Mood normal.         Thought Content: Thought content normal.            Vitals: There were no vitals filed for this visit.       Current Outpatient Medications:     albuterol (VENTOLIN HFA) 90 mcg/actuation inhaler, Inhale 2 puffs into the lungs every 4 (four) hours as needed for Wheezing or Shortness of Breath. Rescue, Disp: 18 g, Rfl: 3    ascorbic Acid (VITAMIN C) 500 mg CpSR, Vitamin C 500 mg capsule,extended release  Take 1 capsule twice a day by oral route., Disp: , Rfl:     atorvastatin (LIPITOR) 20 MG tablet, Take 1 tablet (20 mg total) by mouth once daily., Disp: 90 tablet, Rfl: 3    cetirizine (ZYRTEC) 1 mg/mL syrup, Take 10 mLs (10 mg total) by mouth once daily., Disp: 473 mL, Rfl: 0    CRANBERRY FRUIT EXTRACT (CRANBERRY CONCENTRATE ORAL), Take  1 oz by mouth every evening., Disp: , Rfl:     dextromethorphan-quinidine 20-10 mg (NUEDEXTA) 20-10 mg per capsule, Take 1 capsule by mouth every 12 (twelve) hours., Disp: , Rfl:     docusate sodium (COLACE) 100 MG capsule, Take 200 mg by mouth 2 (two) times daily. , Disp: , Rfl:     fish oil-omega-3 fatty acids 300-1,000 mg capsule, Take 2 g by mouth 2 (two) times daily. , Disp: , Rfl:     ibuprofen (ADVIL,MOTRIN) 800 MG tablet, Take 1 tablet (800 mg total) by mouth every 8 (eight) hours as needed for Pain., Disp: 90 tablet, Rfl: 5    magnesium oxide (MAG-OX) 400 mg tablet, Take 400 mg by mouth 2 (two) times daily. , Disp: , Rfl:     melatonin 5 mg Tab, Take by mouth nightly as needed. , Disp: , Rfl:     modafinil (PROVIGIL) 200 MG Tab, Take 200 mg by mouth once daily., Disp: , Rfl:     multivitamin (THERAGRAN) per tablet, Take 1 tablet by mouth once daily., Disp: , Rfl:     omeprazole (PRILOSEC) 20 MG capsule, Take 20 mg by mouth once daily., Disp: , Rfl:     ondansetron (ZOFRAN-ODT) 8 MG TbDL, Take 1 tablet (8 mg total) by mouth every 8 (eight) hours as needed., Disp: 10 tablet, Rfl: 2    polyethylene glycol (GLYCOLAX) 17 gram PwPk, Take 17 g by mouth 2 (two) times daily., Disp: 100 each, Rfl: 6    potassium chloride SA (K-DUR,KLOR-CON) 20 MEQ tablet, Take 1 tablet (20 mEq total) by mouth 2 (two) times daily., Disp: 180 tablet, Rfl: 3    senna (SENOKOT) 8.6 mg tablet, Take 2 tablets by mouth 2 (two) times daily. , Disp: , Rfl:     sulfamethoxazole-trimethoprim 800-160mg (BACTRIM DS) 800-160 mg Tab, Take 1 tablet by mouth 2 (two) times daily. for 10 days, Disp: 20 tablet, Rfl: 0    vitamin E 100 UNIT capsule, Take 100 Units by mouth once daily., Disp: , Rfl:     zinc gluconate 50 mg tablet, Take 50 mg by mouth once daily., Disp: , Rfl:    Assessment:       Patient Active Problem List    Diagnosis Date Noted    Difficulty walking 12/07/2021    Constipation 03/27/2020    Coronary artery disease of  native artery of native heart with stable angina pectoris 03/23/2020    Suprapubic catheter 03/21/2020    Sinus tachycardia 03/21/2020    Closed fracture of right tibia and fibula with routine healing     Closed fracture of left distal femur     Fracture of right tibia and fibula 03/19/2020    Debility 06/28/2019    Urinary retention 06/28/2019    Gastroesophageal reflux disease without esophagitis 06/28/2019    Left hemiparesis 07/13/2017    Cerebral vascular disease 04/12/2017    Hypomagnesemia 11/25/2014    Unstageable pressure ulcer of left lower back 11/24/2014    Involuntary movements 11/22/2014    Status post mastoidectomy 11/21/2014    Cerebral artery occlusion with cerebral infarction 11/21/2014    Mixed hyperlipidemia 11/21/2014    Encephalopathy 11/20/2014    Morbid obesity 08/03/2012    Obstructive sleep apnea (adult) (pediatric) 08/03/2012          Plan:       Nadeem Alegre  was seen today for follow-up and may need lab work.    Diagnoses and all orders for this visit:    Diagnoses and all orders for this visit:    Cough  -     Discontinue: albuterol (VENTOLIN HFA) 90 mcg/actuation inhaler; Inhale 2 puffs into the lungs every 6 (six) hours as needed for Wheezing. Rescue  -     albuterol (VENTOLIN HFA) 90 mcg/actuation inhaler; Inhale 2 puffs into the lungs every 4 (four) hours as needed for Wheezing or Shortness of Breath. Rescue    Bladder pain  -     Urine culture; Future  -     Urinalysis; Future  -     sulfamethoxazole-trimethoprim 800-160mg (BACTRIM DS) 800-160 mg Tab; Take 1 tablet by mouth 2 (two) times daily. for 10 days    Morbid obesity  Monitor    Coronary artery disease of native artery of native heart with stable angina pectoris  Stable    Left hemiparesis  Stable    Unstageable pressure ulcer of left lower back  Wound care    Suprapubic catheter  Chronic                   Answers for HPI/ROS submitted by the patient on 1/19/2022  Chronicity: new  Onset: in the past 7  days  Progression since onset: waxing and waning  Frequency: every few minutes  Cough characteristics: non-productive  ear congestion: No  heartburn: No  hemoptysis: No  nasal congestion: Yes  sweats: No  weight loss: No  Aggravated by: lying down  Risk factors for lung disease: animal exposure  asthma: No  bronchiectasis: No  bronchitis: No  COPD: No  emphysema: No  pneumonia: No  Treatments tried: body position changes, rest  Improvement on treatment: mild

## 2022-01-25 DIAGNOSIS — N30.00 ACUTE CYSTITIS WITHOUT HEMATURIA: Primary | ICD-10-CM

## 2022-01-25 RX ORDER — AMOXICILLIN AND CLAVULANATE POTASSIUM 875; 125 MG/1; MG/1
1 TABLET, FILM COATED ORAL EVERY 12 HOURS
Qty: 20 TABLET | Refills: 0 | Status: SHIPPED | OUTPATIENT
Start: 2022-01-25 | End: 2022-02-04

## 2022-02-01 ENCOUNTER — DOCUMENT SCAN (OUTPATIENT)
Dept: HOME HEALTH SERVICES | Facility: HOSPITAL | Age: 57
End: 2022-02-01
Payer: MEDICARE

## 2022-02-09 PROCEDURE — G0179 PR HOME HEALTH MD RECERTIFICATION: ICD-10-PCS | Mod: ,,, | Performed by: INTERNAL MEDICINE

## 2022-02-09 PROCEDURE — G0179 MD RECERTIFICATION HHA PT: HCPCS | Mod: ,,, | Performed by: INTERNAL MEDICINE

## 2022-02-10 ENCOUNTER — EXTERNAL HOME HEALTH (OUTPATIENT)
Dept: HOME HEALTH SERVICES | Facility: HOSPITAL | Age: 57
End: 2022-02-10
Payer: MEDICARE

## 2022-02-18 ENCOUNTER — DOCUMENT SCAN (OUTPATIENT)
Dept: HOME HEALTH SERVICES | Facility: HOSPITAL | Age: 57
End: 2022-02-18
Payer: MEDICARE

## 2022-02-22 DIAGNOSIS — N39.0 ENTEROCOCCUS UTI: Primary | ICD-10-CM

## 2022-02-22 DIAGNOSIS — B95.2 ENTEROCOCCUS UTI: Primary | ICD-10-CM

## 2022-02-22 RX ORDER — AMOXICILLIN 875 MG/1
875 TABLET, FILM COATED ORAL EVERY 12 HOURS
Qty: 20 TABLET | Refills: 0 | Status: SHIPPED | OUTPATIENT
Start: 2022-02-22 | End: 2022-03-04

## 2022-03-16 ENCOUNTER — DOCUMENT SCAN (OUTPATIENT)
Dept: HOME HEALTH SERVICES | Facility: HOSPITAL | Age: 57
End: 2022-03-16
Payer: MEDICARE

## 2022-03-29 ENCOUNTER — PATIENT MESSAGE (OUTPATIENT)
Dept: FAMILY MEDICINE | Facility: CLINIC | Age: 57
End: 2022-03-29

## 2022-03-29 ENCOUNTER — DOCUMENT SCAN (OUTPATIENT)
Dept: HOME HEALTH SERVICES | Facility: HOSPITAL | Age: 57
End: 2022-03-29
Payer: MEDICARE

## 2022-03-29 ENCOUNTER — TELEPHONE (OUTPATIENT)
Dept: FAMILY MEDICINE | Facility: CLINIC | Age: 57
End: 2022-03-29

## 2022-03-29 DIAGNOSIS — N39.0 URINARY TRACT INFECTION ASSOCIATED WITH CYSTOSTOMY CATHETER, SUBSEQUENT ENCOUNTER: Primary | ICD-10-CM

## 2022-03-29 DIAGNOSIS — T83.510D URINARY TRACT INFECTION ASSOCIATED WITH CYSTOSTOMY CATHETER, SUBSEQUENT ENCOUNTER: Primary | ICD-10-CM

## 2022-03-29 RX ORDER — AMOXICILLIN 875 MG/1
875 TABLET, FILM COATED ORAL EVERY 12 HOURS
Qty: 20 TABLET | Refills: 0 | Status: SHIPPED | OUTPATIENT
Start: 2022-03-29 | End: 2022-04-08

## 2022-03-29 NOTE — TELEPHONE ENCOUNTER
----- Message from Sarah Arnold sent at 3/29/2022 12:45 PM CDT -----  Patient Call Back    Who Called: Miley/ [facility]    What is the request in detail: Miley calling to speak with someone regarding a urine specimen because the pt catheter keeps clogging. Please call Miley      Can the clinic reply by [facility]?    Best Call Back Number:[phone]

## 2022-03-30 ENCOUNTER — PATIENT MESSAGE (OUTPATIENT)
Dept: FAMILY MEDICINE | Facility: CLINIC | Age: 57
End: 2022-03-30
Payer: MEDICARE

## 2022-04-10 PROCEDURE — G0179 MD RECERTIFICATION HHA PT: HCPCS | Mod: ,,, | Performed by: INTERNAL MEDICINE

## 2022-04-10 PROCEDURE — G0179 PR HOME HEALTH MD RECERTIFICATION: ICD-10-PCS | Mod: ,,, | Performed by: INTERNAL MEDICINE

## 2022-04-12 ENCOUNTER — DOCUMENT SCAN (OUTPATIENT)
Dept: HOME HEALTH SERVICES | Facility: HOSPITAL | Age: 57
End: 2022-04-12
Payer: MEDICARE

## 2022-04-14 ENCOUNTER — EXTERNAL HOME HEALTH (OUTPATIENT)
Dept: HOME HEALTH SERVICES | Facility: HOSPITAL | Age: 57
End: 2022-04-14
Payer: MEDICARE

## 2022-04-14 ENCOUNTER — TELEPHONE (OUTPATIENT)
Dept: FAMILY MEDICINE | Facility: CLINIC | Age: 57
End: 2022-04-14
Payer: MEDICARE

## 2022-04-14 NOTE — TELEPHONE ENCOUNTER
----- Message from Elieser Caldwell sent at 4/14/2022  1:50 PM CDT -----  Type: Needs Medical Advice  Who Called:  Miley/  [facility]    Best Call Back Number: [phone]  Additional Information  Caller states that she will be dropping off the patient's urine sample at [facility] in [place]--Patient's atheter is clogging.         (2) assistive person

## 2022-04-15 DIAGNOSIS — N39.0 URINARY TRACT INFECTION ASSOCIATED WITH CYSTOSTOMY CATHETER, SUBSEQUENT ENCOUNTER: Primary | ICD-10-CM

## 2022-04-15 DIAGNOSIS — T83.510D URINARY TRACT INFECTION ASSOCIATED WITH CYSTOSTOMY CATHETER, SUBSEQUENT ENCOUNTER: Primary | ICD-10-CM

## 2022-04-15 RX ORDER — AMOXICILLIN 875 MG/1
875 TABLET, FILM COATED ORAL EVERY 12 HOURS
Qty: 20 TABLET | Refills: 0 | Status: SHIPPED | OUTPATIENT
Start: 2022-04-15 | End: 2022-04-25

## 2022-04-22 ENCOUNTER — DOCUMENT SCAN (OUTPATIENT)
Dept: HOME HEALTH SERVICES | Facility: HOSPITAL | Age: 57
End: 2022-04-22
Payer: MEDICARE

## 2022-04-22 DIAGNOSIS — N39.0 RECURRENT UTI: Primary | ICD-10-CM

## 2022-04-24 ENCOUNTER — PATIENT MESSAGE (OUTPATIENT)
Dept: FAMILY MEDICINE | Facility: CLINIC | Age: 57
End: 2022-04-24
Payer: MEDICARE

## 2022-05-09 ENCOUNTER — PATIENT MESSAGE (OUTPATIENT)
Dept: SMOKING CESSATION | Facility: CLINIC | Age: 57
End: 2022-05-09
Payer: MEDICARE

## 2022-05-11 ENCOUNTER — PATIENT MESSAGE (OUTPATIENT)
Dept: FAMILY MEDICINE | Facility: CLINIC | Age: 57
End: 2022-05-11
Payer: MEDICARE

## 2022-05-14 ENCOUNTER — PATIENT MESSAGE (OUTPATIENT)
Dept: FAMILY MEDICINE | Facility: CLINIC | Age: 57
End: 2022-05-14
Payer: MEDICARE

## 2022-05-14 DIAGNOSIS — T83.510D URINARY TRACT INFECTION ASSOCIATED WITH CYSTOSTOMY CATHETER, SUBSEQUENT ENCOUNTER: ICD-10-CM

## 2022-05-14 DIAGNOSIS — N39.0 URINARY TRACT INFECTION ASSOCIATED WITH CYSTOSTOMY CATHETER, SUBSEQUENT ENCOUNTER: ICD-10-CM

## 2022-05-16 ENCOUNTER — PATIENT MESSAGE (OUTPATIENT)
Dept: FAMILY MEDICINE | Facility: CLINIC | Age: 57
End: 2022-05-16
Payer: MEDICARE

## 2022-05-16 RX ORDER — AMOXICILLIN AND CLAVULANATE POTASSIUM 875; 125 MG/1; MG/1
1 TABLET, FILM COATED ORAL EVERY 12 HOURS
Qty: 20 TABLET | Refills: 0 | Status: CANCELLED | OUTPATIENT
Start: 2022-05-16 | End: 2022-05-26

## 2022-06-06 ENCOUNTER — DOCUMENT SCAN (OUTPATIENT)
Dept: HOME HEALTH SERVICES | Facility: HOSPITAL | Age: 57
End: 2022-06-06
Payer: MEDICARE

## 2022-06-09 PROCEDURE — G0179 PR HOME HEALTH MD RECERTIFICATION: ICD-10-PCS | Mod: ,,, | Performed by: INTERNAL MEDICINE

## 2022-06-09 PROCEDURE — G0179 MD RECERTIFICATION HHA PT: HCPCS | Mod: ,,, | Performed by: INTERNAL MEDICINE

## 2022-06-20 ENCOUNTER — EXTERNAL HOME HEALTH (OUTPATIENT)
Dept: HOME HEALTH SERVICES | Facility: HOSPITAL | Age: 57
End: 2022-06-20
Payer: MEDICARE

## 2022-07-05 ENCOUNTER — DOCUMENT SCAN (OUTPATIENT)
Dept: HOME HEALTH SERVICES | Facility: HOSPITAL | Age: 57
End: 2022-07-05
Payer: MEDICARE

## 2022-07-22 ENCOUNTER — PATIENT MESSAGE (OUTPATIENT)
Dept: FAMILY MEDICINE | Facility: CLINIC | Age: 57
End: 2022-07-22
Payer: MEDICARE

## 2022-07-22 DIAGNOSIS — Z12.5 PROSTATE CANCER SCREENING: ICD-10-CM

## 2022-07-22 DIAGNOSIS — E66.01 MORBID OBESITY: Primary | ICD-10-CM

## 2022-07-22 DIAGNOSIS — R93.89 ABNORMAL FINDINGS ON DIAGNOSTIC IMAGING OF OTHER SPECIFIED BODY STRUCTURES: ICD-10-CM

## 2022-07-22 DIAGNOSIS — E78.2 MIXED HYPERLIPIDEMIA: ICD-10-CM

## 2022-07-22 DIAGNOSIS — R73.01 IMPAIRED FASTING GLUCOSE: ICD-10-CM

## 2022-07-23 NOTE — TELEPHONE ENCOUNTER
Labs ordered. [facility] send [facility] to [facility]. [facility]

## 2022-07-30 ENCOUNTER — DOCUMENT SCAN (OUTPATIENT)
Dept: HOME HEALTH SERVICES | Facility: HOSPITAL | Age: 57
End: 2022-07-30
Payer: MEDICARE

## 2022-08-08 PROCEDURE — G0179 PR HOME HEALTH MD RECERTIFICATION: ICD-10-PCS | Mod: ,,, | Performed by: INTERNAL MEDICINE

## 2022-08-08 PROCEDURE — G0179 MD RECERTIFICATION HHA PT: HCPCS | Mod: ,,, | Performed by: INTERNAL MEDICINE

## 2022-08-10 ENCOUNTER — DOCUMENT SCAN (OUTPATIENT)
Dept: HOME HEALTH SERVICES | Facility: HOSPITAL | Age: 57
End: 2022-08-10
Payer: MEDICARE

## 2022-08-16 ENCOUNTER — EXTERNAL HOME HEALTH (OUTPATIENT)
Dept: HOME HEALTH SERVICES | Facility: HOSPITAL | Age: 57
End: 2022-08-16
Payer: MEDICARE

## 2022-08-17 ENCOUNTER — DOCUMENT SCAN (OUTPATIENT)
Dept: HOME HEALTH SERVICES | Facility: HOSPITAL | Age: 57
End: 2022-08-17
Payer: MEDICARE

## 2022-08-18 ENCOUNTER — DOCUMENT SCAN (OUTPATIENT)
Dept: HOME HEALTH SERVICES | Facility: HOSPITAL | Age: 57
End: 2022-08-18
Payer: MEDICARE

## 2022-09-02 ENCOUNTER — PATIENT MESSAGE (OUTPATIENT)
Dept: FAMILY MEDICINE | Facility: CLINIC | Age: 57
End: 2022-09-02
Payer: MEDICARE

## 2022-09-04 DIAGNOSIS — T83.510D URINARY TRACT INFECTION ASSOCIATED WITH CYSTOSTOMY CATHETER, SUBSEQUENT ENCOUNTER: Primary | ICD-10-CM

## 2022-09-04 DIAGNOSIS — N39.0 URINARY TRACT INFECTION ASSOCIATED WITH CYSTOSTOMY CATHETER, SUBSEQUENT ENCOUNTER: Primary | ICD-10-CM

## 2022-09-04 RX ORDER — AMOXICILLIN 500 MG/1
500 CAPSULE ORAL EVERY 8 HOURS
Qty: 21 CAPSULE | Refills: 0 | Status: SHIPPED | OUTPATIENT
Start: 2022-09-04 | End: 2022-09-11

## 2022-09-15 ENCOUNTER — DOCUMENT SCAN (OUTPATIENT)
Dept: HOME HEALTH SERVICES | Facility: HOSPITAL | Age: 57
End: 2022-09-15
Payer: MEDICARE

## 2022-09-28 ENCOUNTER — PATIENT MESSAGE (OUTPATIENT)
Dept: FAMILY MEDICINE | Facility: CLINIC | Age: 57
End: 2022-09-28
Payer: MEDICARE

## 2022-09-29 ENCOUNTER — PATIENT MESSAGE (OUTPATIENT)
Dept: FAMILY MEDICINE | Facility: CLINIC | Age: 57
End: 2022-09-29
Payer: MEDICARE

## 2022-09-29 DIAGNOSIS — T83.510D URINARY TRACT INFECTION ASSOCIATED WITH CYSTOSTOMY CATHETER, SUBSEQUENT ENCOUNTER: Primary | ICD-10-CM

## 2022-09-29 DIAGNOSIS — N39.0 URINARY TRACT INFECTION ASSOCIATED WITH CYSTOSTOMY CATHETER, SUBSEQUENT ENCOUNTER: Primary | ICD-10-CM

## 2022-09-29 RX ORDER — CEFDINIR 300 MG/1
300 CAPSULE ORAL 2 TIMES DAILY
Qty: 20 CAPSULE | Refills: 0 | Status: SHIPPED | OUTPATIENT
Start: 2022-09-29 | End: 2022-10-09

## 2022-10-07 ENCOUNTER — DOCUMENT SCAN (OUTPATIENT)
Dept: HOME HEALTH SERVICES | Facility: HOSPITAL | Age: 57
End: 2022-10-07
Payer: MEDICARE

## 2022-10-07 PROCEDURE — G0179 MD RECERTIFICATION HHA PT: HCPCS | Mod: ,,, | Performed by: INTERNAL MEDICINE

## 2022-10-07 PROCEDURE — G0179 PR HOME HEALTH MD RECERTIFICATION: ICD-10-PCS | Mod: ,,, | Performed by: INTERNAL MEDICINE

## 2022-10-13 ENCOUNTER — PATIENT MESSAGE (OUTPATIENT)
Dept: FAMILY MEDICINE | Facility: CLINIC | Age: 57
End: 2022-10-13
Payer: MEDICARE

## 2022-10-17 ENCOUNTER — EXTERNAL HOME HEALTH (OUTPATIENT)
Dept: HOME HEALTH SERVICES | Facility: HOSPITAL | Age: 57
End: 2022-10-17
Payer: MEDICARE

## 2022-11-07 ENCOUNTER — DOCUMENT SCAN (OUTPATIENT)
Dept: HOME HEALTH SERVICES | Facility: HOSPITAL | Age: 57
End: 2022-11-07
Payer: MEDICARE

## 2022-11-29 ENCOUNTER — OFFICE VISIT (OUTPATIENT)
Dept: FAMILY MEDICINE | Facility: CLINIC | Age: 57
End: 2022-11-29
Payer: MEDICARE

## 2022-11-29 ENCOUNTER — PATIENT OUTREACH (OUTPATIENT)
Dept: ADMINISTRATIVE | Facility: HOSPITAL | Age: 57
End: 2022-11-29
Payer: MEDICARE

## 2022-11-29 ENCOUNTER — PATIENT MESSAGE (OUTPATIENT)
Dept: FAMILY MEDICINE | Facility: CLINIC | Age: 57
End: 2022-11-29

## 2022-11-29 DIAGNOSIS — B96.89 BACTERIAL SINUSITIS: Primary | ICD-10-CM

## 2022-11-29 DIAGNOSIS — J32.9 BACTERIAL SINUSITIS: Primary | ICD-10-CM

## 2022-11-29 DIAGNOSIS — R05.9 COUGH: ICD-10-CM

## 2022-11-29 PROCEDURE — 99213 OFFICE O/P EST LOW 20 MIN: CPT | Mod: 95,,, | Performed by: PHYSICIAN ASSISTANT

## 2022-11-29 PROCEDURE — 99213 PR OFFICE/OUTPT VISIT, EST, LEVL III, 20-29 MIN: ICD-10-PCS | Mod: 95,,, | Performed by: PHYSICIAN ASSISTANT

## 2022-11-29 PROCEDURE — 3044F PR MOST RECENT HEMOGLOBIN A1C LEVEL <7.0%: ICD-10-PCS | Mod: CPTII,95,, | Performed by: PHYSICIAN ASSISTANT

## 2022-11-29 PROCEDURE — 1160F RVW MEDS BY RX/DR IN RCRD: CPT | Mod: CPTII,95,, | Performed by: PHYSICIAN ASSISTANT

## 2022-11-29 PROCEDURE — 1159F MED LIST DOCD IN RCRD: CPT | Mod: CPTII,95,, | Performed by: PHYSICIAN ASSISTANT

## 2022-11-29 PROCEDURE — 1159F PR MEDICATION LIST DOCUMENTED IN MEDICAL RECORD: ICD-10-PCS | Mod: CPTII,95,, | Performed by: PHYSICIAN ASSISTANT

## 2022-11-29 PROCEDURE — 3044F HG A1C LEVEL LT 7.0%: CPT | Mod: CPTII,95,, | Performed by: PHYSICIAN ASSISTANT

## 2022-11-29 PROCEDURE — 1160F PR REVIEW ALL MEDS BY PRESCRIBER/CLIN PHARMACIST DOCUMENTED: ICD-10-PCS | Mod: CPTII,95,, | Performed by: PHYSICIAN ASSISTANT

## 2022-11-29 RX ORDER — ALBUTEROL SULFATE 90 UG/1
2 AEROSOL, METERED RESPIRATORY (INHALATION) EVERY 4 HOURS PRN
Qty: 18 G | Refills: 3 | Status: SHIPPED | OUTPATIENT
Start: 2022-11-29 | End: 2023-11-29

## 2022-11-29 RX ORDER — CEFDINIR 300 MG/1
300 CAPSULE ORAL 2 TIMES DAILY
Qty: 20 CAPSULE | Refills: 0 | Status: SHIPPED | OUTPATIENT
Start: 2022-11-29 | End: 2022-12-09

## 2022-11-29 NOTE — PROGRESS NOTES
The patient location is: [place]  The chief complaint leading to consultation is: sinus infection, cough and congestion x 1 wk  Wife has started pt. On Omnicef x 2 days   Has med for 3 more days  Pt. Disabled because of brain injury  Visit type: audiovisual    Face to Face time with patient: 15 min  25 minutes of total time spent on the encounter, which includes face to face time and non-face to face time preparing to see the patient (eg, review of tests), Obtaining and/or reviewing separately obtained history, Documenting clinical information in the electronic or other health record, Independently interpreting results (not separately reported) and communicating results to the patient/family/caregiver, or Care coordination (not separately reported).         Each patient to whom he or she provides medical services by telemedicine is:  (1) informed of the relationship between the physician and patient and the respective role of any other health care provider with respect to management of the patient; and (2) notified that he or she may decline to receive medical services by telemedicine and may withdraw from such care at any time.    Notes:      Diagnoses and all orders for this visit:    Bacterial sinusitis  -     cefdinir (OMNICEF) 300 MG capsule; Take 1 capsule (300 mg total) by mouth 2 (two) times daily. for 10 days    Cough  -     albuterol (VENTOLIN HFA) 90 mcg/actuation inhaler; Inhale 2 puffs into the lungs every 4 (four) hours as needed for Wheezing or Shortness of Breath. Rescue   Discussed otc's  Hydrate  Vaporizer  Pt. Needs further eval if sx worsen or persistAnswers submitted by the patient for this visit:  Cough Questionnaire (Submitted on 11/29/2022)  Chief Complaint: Cough  Chronicity: new  Onset: in the past 7 days  Progression since onset: gradually worsening  Frequency: every few minutes  Cough characteristics: non-productive  chest pain: No  chills: No  ear congestion: No  ear pain: No  fever:  Yes  headaches: Yes  hemoptysis: No  myalgias: No  nasal congestion: Yes  postnasal drip: Yes  rash: No  rhinorrhea: No  shortness of breath: Yes  sore throat: Yes  sweats: No  weight loss: No  wheezing: Yes  Aggravated by: nothing  Risk factors for lung disease: animal exposure  asthma: No  bronchiectasis: No  bronchitis: No  COPD: No  emphysema: No  environmental allergies: No  pneumonia: No  Treatments tried: OTC cough suppressant, a beta-agonist inhaler  Improvement on treatment: mild

## 2022-12-06 PROCEDURE — G0179 PR HOME HEALTH MD RECERTIFICATION: ICD-10-PCS | Mod: ,,, | Performed by: INTERNAL MEDICINE

## 2022-12-06 PROCEDURE — G0179 MD RECERTIFICATION HHA PT: HCPCS | Mod: ,,, | Performed by: INTERNAL MEDICINE

## 2022-12-12 ENCOUNTER — EXTERNAL HOME HEALTH (OUTPATIENT)
Dept: HOME HEALTH SERVICES | Facility: HOSPITAL | Age: 57
End: 2022-12-12
Payer: MEDICARE

## 2022-12-12 ENCOUNTER — DOCUMENT SCAN (OUTPATIENT)
Dept: HOME HEALTH SERVICES | Facility: HOSPITAL | Age: 57
End: 2022-12-12
Payer: MEDICARE

## 2023-01-06 ENCOUNTER — OFFICE VISIT (OUTPATIENT)
Dept: FAMILY MEDICINE | Facility: CLINIC | Age: 58
End: 2023-01-06
Payer: MEDICARE

## 2023-01-06 DIAGNOSIS — N30.01 ACUTE CYSTITIS WITH HEMATURIA: Primary | ICD-10-CM

## 2023-01-06 PROCEDURE — 99213 PR OFFICE/OUTPT VISIT, EST, LEVL III, 20-29 MIN: ICD-10-PCS | Mod: 95,,, | Performed by: PHYSICIAN ASSISTANT

## 2023-01-06 PROCEDURE — 99213 OFFICE O/P EST LOW 20 MIN: CPT | Mod: 95,,, | Performed by: PHYSICIAN ASSISTANT

## 2023-01-06 RX ORDER — AMOXICILLIN AND CLAVULANATE POTASSIUM 875; 125 MG/1; MG/1
1 TABLET, FILM COATED ORAL EVERY 12 HOURS
Qty: 14 TABLET | Refills: 0 | Status: SHIPPED | OUTPATIENT
Start: 2023-01-06 | End: 2023-01-13

## 2023-01-06 NOTE — PROGRESS NOTES
Subjective:      Patient ID: Nadeem Alegre is a 57 y.o. male.    Chief Complaint: Dysuria    Patient is new to me.    Patient has PMH of cerebral artery occlusion with cerebral infarction, HLD, CAD, GERD, and ALIYA.    Dysuria   This is a recurrent problem. The current episode started in the past 7 days. The problem occurs every urination. The problem has been gradually worsening. The quality of the pain is described as aching and stabbing. The pain is at a severity of 6/10. The pain is moderate. The maximum temperature recorded prior to his arrival was 100 - 100.9 F. The fever has been present for 1 - 2 days. He is Not sexually active. There is No history of pyelonephritis. Associated symptoms include behavior changes, a discharge, flank pain, frequency and urgency. Pertinent negatives include no chills, hematuria, hesitancy, nausea, possible pregnancy, sweats, vomiting, weight loss, constipation, rash or withholding. He has tried acetaminophen and increased fluids for the symptoms. The treatment provided mild relief. His past medical history is significant for catheterization, recurrent UTIs and a urological procedure. There is no history of diabetes insipidus, diabetes mellitus, genitourinary reflux, hypertension, kidney stones, a single kidney, STD or urinary stasis.     Patient and wife report fever, dysuria, flank pain, frequency, and urgency for 5 days.  Has suprapubic catheter was changed yesterday with home health before urine collection.  Reviewed urinalysis with patient that was completed yesterday.  Goes to [facility].  Denies chest pain or shortness of breath.    Review of Systems   Constitutional:  Positive for fever (100.7Fmax). Negative for chills and weight loss.   Respiratory:  Negative for shortness of breath.    Cardiovascular:  Negative for chest pain.   Gastrointestinal:  Negative for abdominal pain, constipation, diarrhea, nausea and vomiting.   Genitourinary:  Positive for dysuria, flank  pain, frequency and urgency. Negative for hematuria and hesitancy.   Skin:  Negative for rash.       Objective:   There were no vitals taken for this visit.    Physical Exam  Constitutional:       Appearance: Normal appearance.   HENT:      Head: Normocephalic and atraumatic.      Right Ear: External ear normal.      Left Ear: External ear normal.   Eyes:      Conjunctiva/sclera: Conjunctivae normal.   Pulmonary:      Effort: No respiratory distress.   Neurological:      Mental Status: He is alert.   Psychiatric:         Mood and Affect: Mood normal.         Behavior: Behavior normal.     Assessment:      1. Acute cystitis with hematuria       Plan:   1. Acute cystitis with hematuria  Gave strict ER precautions for worsening symptoms despite treatment.  - amoxicillin-clavulanate 875-125mg (AUGMENTIN) 875-125 mg per tablet; Take 1 tablet by mouth every 12 (twelve) hours. for 7 days  Dispense: 14 tablet; Refill: 0    Follow up as needed.  Patient agreed with plan and expressed understanding.  The patient location is:  Patient Home   The chief complaint leading to consultation is: dysuria  Visit type: Virtual visit with synchronous audio and video  Total time spent with patient: 18 minutes  Each patient to whom he or she provides medical services by telemedicine is:  (1) informed of the relationship between the physician and patient and the respective role of any other health care provider with respect to management of the patient; and (2) notified that he or she may decline to receive medical services by telemedicine and may withdraw from such care at any time.

## 2023-01-07 ENCOUNTER — PATIENT MESSAGE (OUTPATIENT)
Dept: FAMILY MEDICINE | Facility: CLINIC | Age: 58
End: 2023-01-07
Payer: MEDICARE

## 2023-01-19 ENCOUNTER — DOCUMENT SCAN (OUTPATIENT)
Dept: HOME HEALTH SERVICES | Facility: HOSPITAL | Age: 58
End: 2023-01-19
Payer: MEDICARE

## 2023-01-26 ENCOUNTER — OFFICE VISIT (OUTPATIENT)
Dept: FAMILY MEDICINE | Facility: CLINIC | Age: 58
End: 2023-01-26
Payer: MEDICARE

## 2023-01-26 ENCOUNTER — PATIENT MESSAGE (OUTPATIENT)
Dept: FAMILY MEDICINE | Facility: CLINIC | Age: 58
End: 2023-01-26
Payer: MEDICARE

## 2023-01-26 DIAGNOSIS — N39.0 URINARY TRACT INFECTION WITHOUT HEMATURIA, SITE UNSPECIFIED: Primary | ICD-10-CM

## 2023-01-26 PROCEDURE — 99213 OFFICE O/P EST LOW 20 MIN: CPT | Mod: 95,,, | Performed by: PHYSICIAN ASSISTANT

## 2023-01-26 PROCEDURE — 99213 PR OFFICE/OUTPT VISIT, EST, LEVL III, 20-29 MIN: ICD-10-PCS | Mod: 95,,, | Performed by: PHYSICIAN ASSISTANT

## 2023-01-26 RX ORDER — CIPROFLOXACIN 500 MG/1
500 TABLET ORAL 2 TIMES DAILY
Qty: 20 TABLET | Refills: 0 | Status: SHIPPED | OUTPATIENT
Start: 2023-01-26 | End: 2023-02-05

## 2023-01-26 RX ORDER — PHENAZOPYRIDINE HYDROCHLORIDE 100 MG/1
100 TABLET, FILM COATED ORAL 3 TIMES DAILY PRN
Qty: 20 TABLET | Refills: 0 | Status: SHIPPED | OUTPATIENT
Start: 2023-01-26 | End: 2023-02-05

## 2023-01-26 NOTE — PROGRESS NOTES
Answers submitted by the patient for this visit:  Painful Urination Questionnaire (Submitted on 1/26/2023)  Chief Complaint: Dysuria  Chronicity: recurrent  Onset: in the past 7 days  Frequency: every urination  Progression since onset: gradually worsening  Pain quality: aching, stabbing  Pain - numeric: 7/10  Fever: 100 - 100.9 F  Fever duration: less than 1 day  Sexually active?: No  History of pyelonephritis?: No  chills: No  discharge: Yes  flank pain: Yes  frequency: No  hematuria: No  hesitancy: No  nausea: No  possible pregnancy: No  sweats: No  urgency: Yes  vomiting: No  constipation: No  rash: No  weight loss: No  withholding: No  behavior changes: Yes  Treatments tried: acetaminophen, home medications, increased fluids  Improvement on treatment: mild  Pain severity: moderate  catheterization: Yes  diabetes insipidus: No  diabetes mellitus: No  genitourinary reflux: No  hypertension: No  recurrent UTIs: Yes  single kidney: No  STD: No  urinary stasis: No  urological procedure: Yes  kidney stones: No

## 2023-01-26 NOTE — PROGRESS NOTES
Subjective:       Patient ID: Nadeem Alegre is a 57 y.o. male.    Chief Complaint: Urinary Tract Infection    The patient location is: [place], [place]  The chief complaint leading to consultation is: dysuria    Visit type: audiovisual    Face to Face time with patient: 20 minutes of total time spent on the encounter, which includes face to face time and non-face to face time preparing to see the patient (eg, review of tests), Obtaining and/or reviewing separately obtained history, Documenting clinical information in the electronic or other health record, Independently interpreting results (not separately reported) and communicating results to the patient/family/caregiver, or Care coordination (not separately reported).         Each patient to whom he or she provides medical services by telemedicine is:  (1) informed of the relationship between the physician and patient and the respective role of any other health care provider with respect to management of the patient; and (2) notified that he or she may decline to receive medical services by telemedicine and may withdraw from such care at any time.    Notes: Patient is a 56 yo male with encephalopathy. He has an indwelling catheter with ongoing infection.        Urinary Tract Infection   This is a recurrent problem. The current episode started 1 to 4 weeks ago. The problem has been waxing and waning. The quality of the pain is described as burning. The maximum temperature recorded prior to his arrival was 100 - 100.9 F. Associated symptoms include behavior changes, a discharge, flank pain and urgency. Pertinent negatives include no chills, frequency, hematuria, hesitancy, nausea, possible pregnancy, sweats, vomiting, weight loss, constipation, rash or withholding. His past medical history is significant for catheterization, recurrent UTIs and a urological procedure. There is no history of diabetes insipidus, diabetes mellitus, genitourinary reflux, hypertension,  kidney stones, a single kidney, STD or urinary stasis.   Dysuria   This is a recurrent problem. The current episode started in the past 7 days. The problem occurs every urination. The problem has been gradually worsening. The quality of the pain is described as aching and stabbing. The pain is at a severity of 7/10. The pain is moderate. The maximum temperature recorded prior to his arrival was 100 - 100.9 F. The fever has been present for Less than 1 day. He is Not sexually active. There is No history of pyelonephritis. Associated symptoms include behavior changes, a discharge, flank pain and urgency. Pertinent negatives include no chills, frequency, hematuria, hesitancy, nausea, possible pregnancy, sweats, vomiting, weight loss, constipation, rash or withholding. He has tried acetaminophen, home medications and increased fluids for the symptoms. The treatment provided mild relief. His past medical history is significant for catheterization, recurrent UTIs and a urological procedure. There is no history of diabetes insipidus, diabetes mellitus, genitourinary reflux, hypertension, kidney stones, a single kidney, STD or urinary stasis.   Review of Systems   Constitutional:  Positive for fever. Negative for chills and weight loss.   Gastrointestinal:  Negative for constipation, nausea and vomiting.   Genitourinary:  Positive for dysuria, flank pain and urgency. Negative for frequency, hematuria and hesitancy.   Integumentary:  Negative for rash.       Objective:      Physical Exam    Assessment:       Problem List Items Addressed This Visit    None  Visit Diagnoses       Urinary tract infection without hematuria, site unspecified    -  Primary    Relevant Medications    ciprofloxacin HCl (CIPRO) 500 MG tablet    phenazopyridine (PYRIDIUM) 100 MG tablet    Other Relevant Orders    Urinalysis    Urine culture              Plan:       Urinary tract infection without hematuria, site unspecified  -     ciprofloxacin HCl  (CIPRO) 500 MG tablet; Take 1 tablet (500 mg total) by mouth 2 (two) times daily. for 10 days  Dispense: 20 tablet; Refill: 0  -     Urinalysis; Future; Expected date: 02/02/2023  -     Urine culture; Future; Expected date: 02/02/2023  -     phenazopyridine (PYRIDIUM) 100 MG tablet; Take 1 tablet (100 mg total) by mouth 3 (three) times daily as needed for Pain.  Dispense: 20 tablet; Refill: 0

## 2023-02-03 ENCOUNTER — PATIENT MESSAGE (OUTPATIENT)
Dept: FAMILY MEDICINE | Facility: CLINIC | Age: 58
End: 2023-02-03
Payer: MEDICARE

## 2023-02-04 PROCEDURE — G0179 MD RECERTIFICATION HHA PT: HCPCS | Mod: ,,, | Performed by: INTERNAL MEDICINE

## 2023-02-04 PROCEDURE — G0179 PR HOME HEALTH MD RECERTIFICATION: ICD-10-PCS | Mod: ,,, | Performed by: INTERNAL MEDICINE

## 2023-02-27 ENCOUNTER — EXTERNAL HOME HEALTH (OUTPATIENT)
Dept: HOME HEALTH SERVICES | Facility: HOSPITAL | Age: 58
End: 2023-02-27
Payer: MEDICARE

## 2023-02-28 ENCOUNTER — DOCUMENT SCAN (OUTPATIENT)
Dept: HOME HEALTH SERVICES | Facility: HOSPITAL | Age: 58
End: 2023-02-28
Payer: MEDICARE

## 2023-03-14 ENCOUNTER — PES CALL (OUTPATIENT)
Dept: ADMINISTRATIVE | Facility: CLINIC | Age: 58
End: 2023-03-14
Payer: MEDICARE

## 2023-03-16 ENCOUNTER — TELEPHONE (OUTPATIENT)
Dept: FAMILY MEDICINE | Facility: CLINIC | Age: 58
End: 2023-03-16
Payer: MEDICARE

## 2023-03-16 ENCOUNTER — LAB VISIT (OUTPATIENT)
Dept: LAB | Facility: HOSPITAL | Age: 58
End: 2023-03-16
Attending: INTERNAL MEDICINE
Payer: MEDICARE

## 2023-03-16 ENCOUNTER — OFFICE VISIT (OUTPATIENT)
Dept: FAMILY MEDICINE | Facility: CLINIC | Age: 58
End: 2023-03-16
Payer: MEDICARE

## 2023-03-16 VITALS
OXYGEN SATURATION: 97 % | HEART RATE: 113 BPM | SYSTOLIC BLOOD PRESSURE: 112 MMHG | WEIGHT: 255.06 LBS | HEIGHT: 75 IN | BODY MASS INDEX: 31.71 KG/M2 | DIASTOLIC BLOOD PRESSURE: 72 MMHG

## 2023-03-16 DIAGNOSIS — R73.01 IMPAIRED FASTING GLUCOSE: ICD-10-CM

## 2023-03-16 DIAGNOSIS — K21.9 GASTROESOPHAGEAL REFLUX DISEASE WITHOUT ESOPHAGITIS: ICD-10-CM

## 2023-03-16 DIAGNOSIS — G81.94 LEFT HEMIPARESIS: ICD-10-CM

## 2023-03-16 DIAGNOSIS — G47.33 OBSTRUCTIVE SLEEP APNEA (ADULT) (PEDIATRIC): Chronic | ICD-10-CM

## 2023-03-16 DIAGNOSIS — R26.2 DIFFICULTY WALKING: ICD-10-CM

## 2023-03-16 DIAGNOSIS — Z00.00 WELLNESS EXAMINATION: Primary | ICD-10-CM

## 2023-03-16 DIAGNOSIS — E78.2 MIXED HYPERLIPIDEMIA: ICD-10-CM

## 2023-03-16 DIAGNOSIS — D64.9 ANEMIA, UNSPECIFIED TYPE: ICD-10-CM

## 2023-03-16 DIAGNOSIS — I63.50 CEREBRAL ARTERY OCCLUSION WITH CEREBRAL INFARCTION: ICD-10-CM

## 2023-03-16 DIAGNOSIS — F41.9 ANXIETY: ICD-10-CM

## 2023-03-16 DIAGNOSIS — I25.118 CORONARY ARTERY DISEASE OF NATIVE ARTERY OF NATIVE HEART WITH STABLE ANGINA PECTORIS: ICD-10-CM

## 2023-03-16 DIAGNOSIS — E66.01 MORBID OBESITY: ICD-10-CM

## 2023-03-16 DIAGNOSIS — Z93.59 SUPRAPUBIC CATHETER: ICD-10-CM

## 2023-03-16 DIAGNOSIS — E66.09 CLASS 1 OBESITY DUE TO EXCESS CALORIES WITHOUT SERIOUS COMORBIDITY WITH BODY MASS INDEX (BMI) OF 31.0 TO 31.9 IN ADULT: ICD-10-CM

## 2023-03-16 LAB
ALBUMIN SERPL BCP-MCNC: 3.6 G/DL (ref 3.5–5.2)
ALP SERPL-CCNC: 64 U/L (ref 55–135)
ALT SERPL W/O P-5'-P-CCNC: 12 U/L (ref 10–44)
ANION GAP SERPL CALC-SCNC: 11 MMOL/L (ref 8–16)
AST SERPL-CCNC: 12 U/L (ref 10–40)
BILIRUB SERPL-MCNC: 0.8 MG/DL (ref 0.1–1)
BUN SERPL-MCNC: 6 MG/DL (ref 6–20)
CALCIUM SERPL-MCNC: 9 MG/DL (ref 8.7–10.5)
CHLORIDE SERPL-SCNC: 107 MMOL/L (ref 95–110)
CHOLEST SERPL-MCNC: 141 MG/DL (ref 120–199)
CHOLEST/HDLC SERPL: 4.3 {RATIO} (ref 2–5)
CO2 SERPL-SCNC: 20 MMOL/L (ref 23–29)
CREAT SERPL-MCNC: 0.8 MG/DL (ref 0.5–1.4)
EST. GFR  (NO RACE VARIABLE): >60 ML/MIN/1.73 M^2
GLUCOSE SERPL-MCNC: 100 MG/DL (ref 70–110)
HDLC SERPL-MCNC: 33 MG/DL (ref 40–75)
HDLC SERPL: 23.4 % (ref 20–50)
IRON SERPL-MCNC: 12 UG/DL (ref 45–160)
LDLC SERPL CALC-MCNC: 96.8 MG/DL (ref 63–159)
NONHDLC SERPL-MCNC: 108 MG/DL
POTASSIUM SERPL-SCNC: 3.9 MMOL/L (ref 3.5–5.1)
PROT SERPL-MCNC: 7.3 G/DL (ref 6–8.4)
SATURATED IRON: 4 % (ref 20–50)
SODIUM SERPL-SCNC: 138 MMOL/L (ref 136–145)
TOTAL IRON BINDING CAPACITY: 339 UG/DL (ref 250–450)
TRANSFERRIN SERPL-MCNC: 229 MG/DL (ref 200–375)
TRIGL SERPL-MCNC: 56 MG/DL (ref 30–150)

## 2023-03-16 PROCEDURE — 85027 COMPLETE CBC AUTOMATED: CPT | Performed by: INTERNAL MEDICINE

## 2023-03-16 PROCEDURE — 1160F PR REVIEW ALL MEDS BY PRESCRIBER/CLIN PHARMACIST DOCUMENTED: ICD-10-PCS | Mod: CPTII,S$GLB,, | Performed by: INTERNAL MEDICINE

## 2023-03-16 PROCEDURE — 82728 ASSAY OF FERRITIN: CPT | Performed by: INTERNAL MEDICINE

## 2023-03-16 PROCEDURE — 3078F PR MOST RECENT DIASTOLIC BLOOD PRESSURE < 80 MM HG: ICD-10-PCS | Mod: CPTII,S$GLB,, | Performed by: INTERNAL MEDICINE

## 2023-03-16 PROCEDURE — 1159F MED LIST DOCD IN RCRD: CPT | Mod: CPTII,S$GLB,, | Performed by: INTERNAL MEDICINE

## 2023-03-16 PROCEDURE — 1159F PR MEDICATION LIST DOCUMENTED IN MEDICAL RECORD: ICD-10-PCS | Mod: CPTII,S$GLB,, | Performed by: INTERNAL MEDICINE

## 2023-03-16 PROCEDURE — 90686 IIV4 VACC NO PRSV 0.5 ML IM: CPT | Mod: S$GLB,,, | Performed by: INTERNAL MEDICINE

## 2023-03-16 PROCEDURE — G0008 ADMIN INFLUENZA VIRUS VAC: HCPCS | Mod: S$GLB,,, | Performed by: INTERNAL MEDICINE

## 2023-03-16 PROCEDURE — 85007 BL SMEAR W/DIFF WBC COUNT: CPT | Performed by: INTERNAL MEDICINE

## 2023-03-16 PROCEDURE — 3078F DIAST BP <80 MM HG: CPT | Mod: CPTII,S$GLB,, | Performed by: INTERNAL MEDICINE

## 2023-03-16 PROCEDURE — 36415 COLL VENOUS BLD VENIPUNCTURE: CPT | Mod: PO | Performed by: INTERNAL MEDICINE

## 2023-03-16 PROCEDURE — 99396 PR PREVENTIVE VISIT,EST,40-64: ICD-10-PCS | Mod: GZ,S$GLB,, | Performed by: INTERNAL MEDICINE

## 2023-03-16 PROCEDURE — 90686 FLU VACCINE (QUAD) GREATER THAN OR EQUAL TO 3YO PRESERVATIVE FREE IM: ICD-10-PCS | Mod: S$GLB,,, | Performed by: INTERNAL MEDICINE

## 2023-03-16 PROCEDURE — 84466 ASSAY OF TRANSFERRIN: CPT | Performed by: INTERNAL MEDICINE

## 2023-03-16 PROCEDURE — 99999 PR PBB SHADOW E&M-EST. PATIENT-LVL IV: CPT | Mod: PBBFAC,,, | Performed by: INTERNAL MEDICINE

## 2023-03-16 PROCEDURE — G0008 FLU VACCINE (QUAD) GREATER THAN OR EQUAL TO 3YO PRESERVATIVE FREE IM: ICD-10-PCS | Mod: S$GLB,,, | Performed by: INTERNAL MEDICINE

## 2023-03-16 PROCEDURE — 80053 COMPREHEN METABOLIC PANEL: CPT | Performed by: INTERNAL MEDICINE

## 2023-03-16 PROCEDURE — 99999 PR PBB SHADOW E&M-EST. PATIENT-LVL IV: ICD-10-PCS | Mod: PBBFAC,,, | Performed by: INTERNAL MEDICINE

## 2023-03-16 PROCEDURE — 80061 LIPID PANEL: CPT | Performed by: INTERNAL MEDICINE

## 2023-03-16 PROCEDURE — 1160F RVW MEDS BY RX/DR IN RCRD: CPT | Mod: CPTII,S$GLB,, | Performed by: INTERNAL MEDICINE

## 2023-03-16 PROCEDURE — 99396 PREV VISIT EST AGE 40-64: CPT | Mod: GZ,S$GLB,, | Performed by: INTERNAL MEDICINE

## 2023-03-16 PROCEDURE — 83036 HEMOGLOBIN GLYCOSYLATED A1C: CPT | Performed by: INTERNAL MEDICINE

## 2023-03-16 PROCEDURE — 3074F SYST BP LT 130 MM HG: CPT | Mod: CPTII,S$GLB,, | Performed by: INTERNAL MEDICINE

## 2023-03-16 PROCEDURE — 3074F PR MOST RECENT SYSTOLIC BLOOD PRESSURE < 130 MM HG: ICD-10-PCS | Mod: CPTII,S$GLB,, | Performed by: INTERNAL MEDICINE

## 2023-03-16 PROCEDURE — 3008F PR BODY MASS INDEX (BMI) DOCUMENTED: ICD-10-PCS | Mod: CPTII,S$GLB,, | Performed by: INTERNAL MEDICINE

## 2023-03-16 PROCEDURE — 3008F BODY MASS INDEX DOCD: CPT | Mod: CPTII,S$GLB,, | Performed by: INTERNAL MEDICINE

## 2023-03-16 RX ORDER — ALPRAZOLAM 0.5 MG/1
0.5 TABLET ORAL DAILY PRN
Qty: 10 TABLET | Refills: 0 | Status: SHIPPED | OUTPATIENT
Start: 2023-03-16 | End: 2023-11-20 | Stop reason: SDUPTHER

## 2023-03-16 RX ORDER — CYCLOBENZAPRINE HCL 10 MG
10 TABLET ORAL 3 TIMES DAILY PRN
COMMUNITY

## 2023-03-16 NOTE — TELEPHONE ENCOUNTER
----- Message from Maria Fernanda Bhatia sent at 3/16/2023 12:54 PM CDT -----  Contact: pt's wife  Patient would like to know if his and his wife's appointments can be changed to virtual visits. Please call back to advise. Thanks!

## 2023-03-16 NOTE — PROGRESS NOTES
Patient ID: Nadeem Alegre     Chief Complaint:   Chief Complaint   Patient presents with    Follow-up        HPI:  Annual exam and overall he has been doing about the same.  He has had a few urinary tract infections over the past year and his symptoms seems to be mental status changes.  His wife requests something mild to help calm him down during his acute episodes as he becomes quite unruly, and I think low-dose Xanax 0.5 mg, but half of this per day, would be a good place to start.  His vital signs look good.  He is due for some labs which we will get today.  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  He has stopped his atorvastatin for few months now so we will see if he needs something to lower his LDL.  He is not due for PSA until after July so we will get it with home health at that time.  He does consent to a flu shot today but politely declines a COVID vaccine.    Review of Systems   Constitutional: Negative.    HENT: Negative.     Eyes: Negative.    Respiratory: Negative.     Cardiovascular: Negative.    Gastrointestinal: Negative.    Endocrine: Negative.    Genitourinary: Negative.    Musculoskeletal: Negative.    Skin: Negative.    Allergic/Immunologic: Negative.    Neurological: Negative.    Hematological: Negative.    Psychiatric/Behavioral: Negative.          Objective:      Physical Exam   Physical Exam  Vitals and nursing note reviewed.   Constitutional:       Appearance: Normal appearance. He is well-developed. He is obese.   HENT:      Head: Normocephalic and atraumatic.      Nose: Nose normal.      Mouth/Throat:      Mouth: Mucous membranes are moist.   Eyes:      Extraocular Movements: Extraocular movements intact.      Conjunctiva/sclera: Conjunctivae normal.      Pupils: Pupils are equal, round, and reactive to light.   Cardiovascular:      Rate and Rhythm: Normal rate and regular rhythm.      Pulses: Normal pulses.      Heart sounds: Normal heart  "sounds.   Pulmonary:      Effort: Pulmonary effort is normal.      Breath sounds: Normal breath sounds.   Abdominal:      General: Bowel sounds are normal.      Palpations: Abdomen is soft.   Genitourinary:     Comments: + Suprapubic catheter  Musculoskeletal:      Cervical back: Normal range of motion and neck supple.      Comments: Ambulates in wheelchair   Left side hemiparesis     Skin:     General: Skin is warm and dry.      Capillary Refill: Capillary refill takes less than 2 seconds.   Neurological:      General: No focal deficit present.      Mental Status: He is alert and oriented to person, place, and time.   Psychiatric:         Mood and Affect: Mood normal.         Behavior: Behavior normal.         Thought Content: Thought content normal.         Judgment: Judgment normal.          Vitals:   Vitals:    03/16/23 1358   BP: 112/72   BP Location: Right arm   Patient Position: Sitting   BP Method: Large (Manual)   Pulse: (!) 113   SpO2: 97%   Weight: 115.7 kg (255 lb 1.2 oz)   Height: 6' 3" (1.905 m)          Current Outpatient Medications:     albuterol (VENTOLIN HFA) 90 mcg/actuation inhaler, Inhale 2 puffs into the lungs every 4 (four) hours as needed for Wheezing or Shortness of Breath. Rescue, Disp: 18 g, Rfl: 3    ascorbic Acid (VITAMIN C) 500 mg CpSR, Vitamin C 500 mg capsule,extended release  Take 1 capsule twice a day by oral route., Disp: , Rfl:     CRANBERRY FRUIT EXTRACT (CRANBERRY CONCENTRATE ORAL), Take 1 oz by mouth every evening., Disp: , Rfl:     cyclobenzaprine (FLEXERIL) 10 MG tablet, Take 10 mg by mouth 3 (three) times daily as needed for Muscle spasms., Disp: , Rfl:     docusate sodium (COLACE) 100 MG capsule, Take 200 mg by mouth 2 (two) times daily. , Disp: , Rfl:     fish oil-omega-3 fatty acids 300-1,000 mg capsule, Take 2 g by mouth 2 (two) times daily. , Disp: , Rfl:     ibuprofen (ADVIL,MOTRIN) 800 MG tablet, Take 1 tablet (800 mg total) by mouth every 8 (eight) hours as " needed for Pain., Disp: 90 tablet, Rfl: 5    magnesium oxide (MAG-OX) 400 mg tablet, Take 400 mg by mouth 2 (two) times daily. , Disp: , Rfl:     melatonin 5 mg Tab, Take by mouth nightly as needed. , Disp: , Rfl:     multivitamin (THERAGRAN) per tablet, Take 1 tablet by mouth once daily., Disp: , Rfl:     omeprazole (PRILOSEC) 20 MG capsule, Take 20 mg by mouth once daily., Disp: , Rfl:     ondansetron (ZOFRAN-ODT) 8 MG TbDL, Take 1 tablet (8 mg total) by mouth every 8 (eight) hours as needed., Disp: 10 tablet, Rfl: 2    polyethylene glycol (GLYCOLAX) 17 gram PwPk, Take 17 g by mouth 2 (two) times daily., Disp: 100 each, Rfl: 6    senna (SENOKOT) 8.6 mg tablet, Take 2 tablets by mouth 2 (two) times daily. , Disp: , Rfl:     zinc gluconate 50 mg tablet, Take 50 mg by mouth once daily., Disp: , Rfl:     ALPRAZolam (XANAX) 0.5 MG tablet, Take 1 tablet (0.5 mg total) by mouth daily as needed for Anxiety., Disp: 10 tablet, Rfl: 0    cetirizine (ZYRTEC) 1 mg/mL syrup, Take 10 mLs (10 mg total) by mouth once daily., Disp: 473 mL, Rfl: 0    potassium chloride SA (K-DUR,KLOR-CON) 20 MEQ tablet, Take 1 tablet (20 mEq total) by mouth 2 (two) times daily. (Patient not taking: Reported on 3/16/2023), Disp: 180 tablet, Rfl: 3    vitamin E 100 UNIT capsule, Take 100 Units by mouth once daily., Disp: , Rfl:    Assessment:       Patient Active Problem List    Diagnosis Date Noted    Difficulty walking 12/07/2021    Constipation 03/27/2020    Coronary artery disease of native artery of native heart with stable angina pectoris 03/23/2020    Suprapubic catheter 03/21/2020    Sinus tachycardia 03/21/2020    Closed fracture of right tibia and fibula with routine healing     Closed fracture of left distal femur     Fracture of right tibia and fibula 03/19/2020    Debility 06/28/2019    Urinary retention 06/28/2019    Gastroesophageal reflux disease without esophagitis 06/28/2019    Left hemiparesis 07/13/2017     Cerebral vascular disease 04/12/2017    Hypomagnesemia 11/25/2014    Unstageable pressure ulcer of left lower back 11/24/2014    Involuntary movements 11/22/2014    Status post mastoidectomy 11/21/2014    Cerebral artery occlusion with cerebral infarction 11/21/2014    Mixed hyperlipidemia 11/21/2014    Encephalopathy 11/20/2014    Obstructive sleep apnea (adult) (pediatric) 08/03/2012    Morbid obesity           Plan:       Nadeem Alegre  was seen today for follow-up and may need lab work.    Diagnoses and all orders for this visit:    Nadeem was seen today for follow-up.    Diagnoses and all orders for this visit:    Wellness examination    Mixed hyperlipidemia  -     Lipid Panel; Future  -     Comprehensive Metabolic Panel; Future  Check labs      Impaired fasting glucose  -     Hemoglobin A1C; Future  Check labs      Anemia, unspecified type  -     CBC Auto Differential; Future  -     Ferritin; Future  -     Iron and TIBC; Future  Check labs      Anxiety  -     ALPRAZolam (XANAX) 0.5 MG tablet; Take 1 tablet (0.5 mg total) by mouth daily as needed for Anxiety.  Monitor     Class 1 obesity due to excess calories without serious comorbidity with body mass index (BMI) of 31.0 to 31.9 in adult  Monitor     Cerebral artery occlusion with cerebral infarction  Take Aspirin 81 mg / day     Coronary artery disease of native artery of native heart with stable angina pectoris  Aspirin 81 mg / day   Consider restarting Statin     Gastroesophageal reflux disease without esophagitis  Controlled with med     Difficulty walking  Ambulates in wheelchair     Obstructive sleep apnea (adult) (pediatric)  Intolerant of CPAP     Left hemiparesis  Stable     Suprapubic catheter  Stable  Occasionally gets Urinary tract infection     Morbid obesity  Monitor

## 2023-03-16 NOTE — TELEPHONE ENCOUNTER
Spoke with patients wife, advised her that Dr. Seals needs to see patient in person to complete his paperwork. She verbalized understanding and is on her way to the appointment.

## 2023-03-17 LAB
BASOPHILS NFR BLD: 0 % (ref 0–1.9)
DIFFERENTIAL METHOD: ABNORMAL
EOSINOPHIL NFR BLD: 2 % (ref 0–8)
ERYTHROCYTE [DISTWIDTH] IN BLOOD BY AUTOMATED COUNT: 14.5 % (ref 11.5–14.5)
ESTIMATED AVG GLUCOSE: 108 MG/DL (ref 68–131)
FERRITIN SERPL-MCNC: 207 NG/ML (ref 20–300)
HBA1C MFR BLD: 5.4 % (ref 4–5.6)
HCT VFR BLD AUTO: 44.1 % (ref 40–54)
HGB BLD-MCNC: 14.3 G/DL (ref 14–18)
IMM GRANULOCYTES # BLD AUTO: ABNORMAL K/UL (ref 0–0.04)
IMM GRANULOCYTES NFR BLD AUTO: ABNORMAL % (ref 0–0.5)
LYMPHOCYTES NFR BLD: 12 % (ref 18–48)
MCH RBC QN AUTO: 28.4 PG (ref 27–31)
MCHC RBC AUTO-ENTMCNC: 32.4 G/DL (ref 32–36)
MCV RBC AUTO: 88 FL (ref 82–98)
METAMYELOCYTES NFR BLD MANUAL: 1 %
MONOCYTES NFR BLD: 6 % (ref 4–15)
NEUTROPHILS NFR BLD: 74 % (ref 38–73)
NEUTS BAND NFR BLD MANUAL: 5 %
NRBC BLD-RTO: 0 /100 WBC
PLATELET # BLD AUTO: 261 K/UL (ref 150–450)
PLATELET BLD QL SMEAR: ABNORMAL
PMV BLD AUTO: 11.2 FL (ref 9.2–12.9)
RBC # BLD AUTO: 5.03 M/UL (ref 4.6–6.2)
WBC # BLD AUTO: 13.83 K/UL (ref 3.9–12.7)

## 2023-03-22 ENCOUNTER — PATIENT MESSAGE (OUTPATIENT)
Dept: FAMILY MEDICINE | Facility: CLINIC | Age: 58
End: 2023-03-22
Payer: MEDICARE

## 2023-03-22 ENCOUNTER — DOCUMENT SCAN (OUTPATIENT)
Dept: HOME HEALTH SERVICES | Facility: HOSPITAL | Age: 58
End: 2023-03-22
Payer: MEDICARE

## 2023-04-05 PROCEDURE — G0179 MD RECERTIFICATION HHA PT: HCPCS | Mod: ,,, | Performed by: INTERNAL MEDICINE

## 2023-04-05 PROCEDURE — G0179 PR HOME HEALTH MD RECERTIFICATION: ICD-10-PCS | Mod: ,,, | Performed by: INTERNAL MEDICINE

## 2023-04-11 ENCOUNTER — TELEPHONE (OUTPATIENT)
Dept: FAMILY MEDICINE | Facility: CLINIC | Age: 58
End: 2023-04-11
Payer: MEDICARE

## 2023-04-25 ENCOUNTER — PATIENT MESSAGE (OUTPATIENT)
Dept: FAMILY MEDICINE | Facility: CLINIC | Age: 58
End: 2023-04-25
Payer: MEDICARE

## 2023-05-01 ENCOUNTER — DOCUMENT SCAN (OUTPATIENT)
Dept: HOME HEALTH SERVICES | Facility: HOSPITAL | Age: 58
End: 2023-05-01
Payer: MEDICARE

## 2023-05-05 ENCOUNTER — EXTERNAL HOME HEALTH (OUTPATIENT)
Dept: HOME HEALTH SERVICES | Facility: HOSPITAL | Age: 58
End: 2023-05-05
Payer: MEDICARE

## 2023-05-11 ENCOUNTER — DOCUMENT SCAN (OUTPATIENT)
Dept: HOME HEALTH SERVICES | Facility: HOSPITAL | Age: 58
End: 2023-05-11
Payer: MEDICARE

## 2023-06-02 ENCOUNTER — PES CALL (OUTPATIENT)
Dept: ADMINISTRATIVE | Facility: CLINIC | Age: 58
End: 2023-06-02
Payer: MEDICARE

## 2023-06-04 PROCEDURE — G0179 MD RECERTIFICATION HHA PT: HCPCS | Mod: ,,, | Performed by: INTERNAL MEDICINE

## 2023-06-04 PROCEDURE — G0179 PR HOME HEALTH MD RECERTIFICATION: ICD-10-PCS | Mod: ,,, | Performed by: INTERNAL MEDICINE

## 2023-06-11 ENCOUNTER — DOCUMENT SCAN (OUTPATIENT)
Dept: HOME HEALTH SERVICES | Facility: HOSPITAL | Age: 58
End: 2023-06-11
Payer: MEDICARE

## 2023-06-12 ENCOUNTER — EXTERNAL HOME HEALTH (OUTPATIENT)
Dept: HOME HEALTH SERVICES | Facility: HOSPITAL | Age: 58
End: 2023-06-12
Payer: MEDICARE

## 2023-06-30 ENCOUNTER — DOCUMENT SCAN (OUTPATIENT)
Dept: HOME HEALTH SERVICES | Facility: HOSPITAL | Age: 58
End: 2023-06-30
Payer: MEDICARE

## 2023-07-07 ENCOUNTER — DOCUMENT SCAN (OUTPATIENT)
Dept: HOME HEALTH SERVICES | Facility: HOSPITAL | Age: 58
End: 2023-07-07
Payer: MEDICARE

## 2023-07-31 ENCOUNTER — DOCUMENT SCAN (OUTPATIENT)
Dept: HOME HEALTH SERVICES | Facility: HOSPITAL | Age: 58
End: 2023-07-31
Payer: MEDICARE

## 2023-08-03 ENCOUNTER — DOCUMENT SCAN (OUTPATIENT)
Dept: HOME HEALTH SERVICES | Facility: HOSPITAL | Age: 58
End: 2023-08-03
Payer: MEDICARE

## 2023-08-03 PROCEDURE — G0179 MD RECERTIFICATION HHA PT: HCPCS | Mod: ,,, | Performed by: INTERNAL MEDICINE

## 2023-08-03 PROCEDURE — G0179 PR HOME HEALTH MD RECERTIFICATION: ICD-10-PCS | Mod: ,,, | Performed by: INTERNAL MEDICINE

## 2023-08-30 ENCOUNTER — EXTERNAL HOME HEALTH (OUTPATIENT)
Dept: HOME HEALTH SERVICES | Facility: HOSPITAL | Age: 58
End: 2023-08-30
Payer: MEDICARE

## 2023-09-05 ENCOUNTER — DOCUMENT SCAN (OUTPATIENT)
Dept: HOME HEALTH SERVICES | Facility: HOSPITAL | Age: 58
End: 2023-09-05
Payer: MEDICARE

## 2023-10-02 PROCEDURE — G0179 MD RECERTIFICATION HHA PT: HCPCS | Mod: ,,, | Performed by: INTERNAL MEDICINE

## 2023-10-02 PROCEDURE — G0179 PR HOME HEALTH MD RECERTIFICATION: ICD-10-PCS | Mod: ,,, | Performed by: INTERNAL MEDICINE

## 2023-10-03 ENCOUNTER — DOCUMENT SCAN (OUTPATIENT)
Dept: HOME HEALTH SERVICES | Facility: HOSPITAL | Age: 58
End: 2023-10-03
Payer: MEDICARE

## 2023-10-04 ENCOUNTER — EXTERNAL HOME HEALTH (OUTPATIENT)
Dept: HOME HEALTH SERVICES | Facility: HOSPITAL | Age: 58
End: 2023-10-04
Payer: MEDICARE

## 2023-10-19 ENCOUNTER — PATIENT MESSAGE (OUTPATIENT)
Dept: FAMILY MEDICINE | Facility: CLINIC | Age: 58
End: 2023-10-19
Payer: MEDICARE

## 2023-10-19 ENCOUNTER — ON-DEMAND VIRTUAL (OUTPATIENT)
Dept: URGENT CARE | Facility: CLINIC | Age: 58
End: 2023-10-19
Payer: MEDICARE

## 2023-10-19 DIAGNOSIS — B96.89 BACTERIAL UPPER RESPIRATORY INFECTION: Primary | ICD-10-CM

## 2023-10-19 DIAGNOSIS — J06.9 BACTERIAL UPPER RESPIRATORY INFECTION: Primary | ICD-10-CM

## 2023-10-19 PROCEDURE — 99213 PR OFFICE/OUTPT VISIT, EST, LEVL III, 20-29 MIN: ICD-10-PCS | Mod: 95,,,

## 2023-10-19 PROCEDURE — 99213 OFFICE O/P EST LOW 20 MIN: CPT | Mod: 95,,,

## 2023-10-19 RX ORDER — DOXYCYCLINE 100 MG/1
100 CAPSULE ORAL EVERY 12 HOURS
Qty: 14 CAPSULE | Refills: 0 | Status: SHIPPED | OUTPATIENT
Start: 2023-10-19 | End: 2023-10-26

## 2023-10-19 RX ORDER — PREDNISONE 10 MG/1
TABLET ORAL
Qty: 8 TABLET | Refills: 0 | Status: SHIPPED | OUTPATIENT
Start: 2023-10-19 | End: 2023-11-21

## 2023-10-19 NOTE — PROGRESS NOTES
Subjective:      Patient ID: Nadeem Alegre is a 58 y.o. male.    Vitals:  vitals were not taken for this visit.     Chief Complaint: Sinus Problem      Visit Type: TELE AUDIOVISUAL    Present with the patient at the time of consultation: TELEMED PRESENT WITH PATIENT: spouse    Past Medical History:   Diagnosis Date    Acute colitis 4/12/2017    Anxiety 8/3/2012    Debility     Depression 8/3/2012    Difficulty walking     E. 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    Sacral decubitus ulcer     Severe sepsis(995.92) 4/12/2017    Sleep apnea     uses bipap    Small bowel obstruction 4/12/2017    Stroke     Urinary tract infection without hematuria 4/12/2017     Past Surgical History:   Procedure Laterality Date    CORONARY ANGIOGRAPHY N/A 3/23/2020    Procedure: ANGIOGRAM, CORONARY ARTERY;  Surgeon: Domingo Kasper MD;  Location: [facility] CATH;  Service: Cardiovascular;  Laterality: N/A;    GASTROSTOMY TUBE PLACEMENT      INSERTION OF INTRAMEDULLARY NAIL INTO TIBIA Right 3/20/2020    Procedure: INSERTION, INTRAMEDULLARY TONI, TIBIA;  Surgeon: Miguel Sadler MD;  Location: [facility] OR;  Service: Orthopedics;  Laterality: Right;    LEFT HEART CATHETERIZATION Left 3/23/2020    Procedure: Left heart cath;  Surgeon: Domingo Kasper MD;  Location: [facility] CATH;  Service: Cardiovascular;  Laterality: Left;    MASTOID SURGERY      RETROGRADE INTRAMEDULLARY RODDING OF DISTAL FEMUR Left 3/20/2020    Procedure: INSERTION, INTRAMEDULLARY TONI, FEMUR, DISTAL, RETROGRADE;  Surgeon: Miguel Sadler MD;  Location: [facility] OR;  Service: Orthopedics;  Laterality: Left;    SUPRAPUBIC TUBE PLACEMENT N/A 02/2017    TRACHEOSTOMY CLOSURE       Review of patient's allergies indicates:  No Known Allergies  Current Outpatient Medications on File Prior  to Visit   Medication Sig Dispense Refill    albuterol (VENTOLIN HFA) 90 mcg/actuation inhaler Inhale 2 puffs into the lungs every 4 (four) hours as needed for Wheezing or Shortness of Breath. Rescue 18 g 3    ALPRAZolam (XANAX) 0.5 MG tablet Take 1 tablet (0.5 mg total) by mouth daily as needed for Anxiety. 10 tablet 0    ascorbic Acid (VITAMIN C) 500 mg CpSR Vitamin C 500 mg capsule,extended release   Take 1 capsule twice a day by oral route.      cetirizine (ZYRTEC) 1 mg/mL syrup Take 10 mLs (10 mg total) by mouth once daily. 473 mL 0    CRANBERRY FRUIT EXTRACT (CRANBERRY CONCENTRATE ORAL) Take 1 oz by mouth every evening.      cyclobenzaprine (FLEXERIL) 10 MG tablet Take 10 mg by mouth 3 (three) times daily as needed for Muscle spasms.      docusate sodium (COLACE) 100 MG capsule Take 200 mg by mouth 2 (two) times daily.       fish oil-omega-3 fatty acids 300-1,000 mg capsule Take 2 g by mouth 2 (two) times daily.       ibuprofen (ADVIL,MOTRIN) 800 MG tablet Take 1 tablet (800 mg total) by mouth every 8 (eight) hours as needed for Pain. 90 tablet 5    magnesium oxide (MAG-OX) 400 mg tablet Take 400 mg by mouth 2 (two) times daily.       melatonin 5 mg Tab Take by mouth nightly as needed.       multivitamin (THERAGRAN) per tablet Take 1 tablet by mouth once daily.      omeprazole (PRILOSEC) 20 MG capsule Take 20 mg by mouth once daily.      ondansetron (ZOFRAN-ODT) 8 MG TbDL Take 1 tablet (8 mg total) by mouth every 8 (eight) hours as needed. 10 tablet 2    polyethylene glycol (GLYCOLAX) 17 gram PwPk Take 17 g by mouth 2 (two) times daily. 100 each 6    potassium chloride SA (K-DUR,KLOR-CON) 20 MEQ tablet Take 1 tablet (20 mEq total) by mouth 2 (two) times daily. (Patient not taking: Reported on 3/16/2023) 180 tablet 3    senna (SENOKOT) 8.6 mg tablet Take 2 tablets by mouth 2 (two) times daily.       vitamin E 100 UNIT capsule Take 100 Units by mouth once daily.      zinc gluconate 50 mg tablet Take 50 mg by  mouth once daily.       No current facility-administered medications on file prior to visit.     Family History   Problem Relation Age of Onset    Heart disease Father 64        CABG    Colon cancer Father 66        colorectal    Alzheimer's disease Mother 50        Early onset dementia    Heart disease Mother        Medications Ordered                [address] [place] [address]   [address] [facility] [address]    Telephone: [phone]   Fax: [phone]   Hours: Not open 24 hours                         E-Prescribed (2 of 2)              doxycycline (VIBRAMYCIN) 100 MG Cap    Sig: Take 1 capsule (100 mg total) by mouth every 12 (twelve) hours. for 7 days       Start: 10/19/23     Quantity: 14 capsule Refills: 0                         predniSONE (DELTASONE) 10 MG tablet    Sig: Take 20 mg on day one then take 10 mg on day 2-7       Start: 10/19/23     Quantity: 8 tablet Refills: 0                           Ohs Peq Odvv Intake    10/19/2023 12:59 PM CDT - Filed by Mely Wells (Proxy)   Describe your reason for todays visit respiratory infection   What is your current physical address in the event of a medical emergency? [address] [place] [address]   Are you able to take your vital signs? No   Please attach any relevant images or files          Patients wife states that patient is bed bound and last week they had their grandaughter over who was sick with an URI. Patient states that he is having a bad cough, sore throat, and nasal congestion. Patients wife states that he has not been running a fever. Patients wife states that patients cough is productive with green mucous. Patient states that he is also having green nasal drainage. Patient denies any SOB or chest pain at this time.         Constitution: Negative.   HENT:  Positive for congestion, postnasal drip and sore throat.    Neck: neck negative.   Cardiovascular: Negative.    Eyes:  Negative.    Respiratory:  Positive for cough.    Gastrointestinal: Negative.    Endocrine: negative.   Genitourinary: Negative.    Musculoskeletal: Negative.    Skin: Negative.    Allergic/Immunologic: Negative.    Neurological: Negative.    Hematologic/Lymphatic: Negative.    Psychiatric/Behavioral: Negative.          Objective:   The physical exam was conducted virtually.  Physical Exam   HENT:   Head: Normocephalic and atraumatic.   Eyes: Conjunctivae are normal. Pupils are equal, round, and reactive to light. Extraocular movement intact   Neck: Neck supple.   Pulmonary/Chest: Effort normal.   Abdominal: Normal appearance.   Musculoskeletal:      Comments: Bed bound   Neurological: He is alert.   Skin: Skin is warm.   Psychiatric: His behavior is normal. Mood, judgment and thought content normal.       Assessment:     1. Bacterial upper respiratory infection        Plan:       Bacterial upper respiratory infection  -     doxycycline (VIBRAMYCIN) 100 MG Cap; Take 1 capsule (100 mg total) by mouth every 12 (twelve) hours. for 7 days  Dispense: 14 capsule; Refill: 0  -     predniSONE (DELTASONE) 10 MG tablet; Take 20 mg on day one then take 10 mg on day 2-7  Dispense: 8 tablet; Refill: 0

## 2023-11-17 ENCOUNTER — DOCUMENT SCAN (OUTPATIENT)
Dept: HOME HEALTH SERVICES | Facility: HOSPITAL | Age: 58
End: 2023-11-17
Payer: MEDICARE

## 2023-11-20 ENCOUNTER — OFFICE VISIT (OUTPATIENT)
Dept: PRIMARY CARE CLINIC | Facility: CLINIC | Age: 58
End: 2023-11-20
Payer: MEDICARE

## 2023-11-20 ENCOUNTER — PATIENT MESSAGE (OUTPATIENT)
Dept: FAMILY MEDICINE | Facility: CLINIC | Age: 58
End: 2023-11-20
Payer: MEDICARE

## 2023-11-20 DIAGNOSIS — R82.90 CLOUDY URINE: Primary | ICD-10-CM

## 2023-11-20 DIAGNOSIS — F41.9 ANXIETY: ICD-10-CM

## 2023-11-20 PROCEDURE — 1159F MED LIST DOCD IN RCRD: CPT | Mod: CPTII,95,, | Performed by: PHYSICIAN ASSISTANT

## 2023-11-20 PROCEDURE — 1160F PR REVIEW ALL MEDS BY PRESCRIBER/CLIN PHARMACIST DOCUMENTED: ICD-10-PCS | Mod: CPTII,95,, | Performed by: PHYSICIAN ASSISTANT

## 2023-11-20 PROCEDURE — 1159F PR MEDICATION LIST DOCUMENTED IN MEDICAL RECORD: ICD-10-PCS | Mod: CPTII,95,, | Performed by: PHYSICIAN ASSISTANT

## 2023-11-20 PROCEDURE — 3044F HG A1C LEVEL LT 7.0%: CPT | Mod: CPTII,95,, | Performed by: PHYSICIAN ASSISTANT

## 2023-11-20 PROCEDURE — 3044F PR MOST RECENT HEMOGLOBIN A1C LEVEL <7.0%: ICD-10-PCS | Mod: CPTII,95,, | Performed by: PHYSICIAN ASSISTANT

## 2023-11-20 PROCEDURE — 99213 PR OFFICE/OUTPT VISIT, EST, LEVL III, 20-29 MIN: ICD-10-PCS | Mod: 95,,, | Performed by: PHYSICIAN ASSISTANT

## 2023-11-20 PROCEDURE — 1160F RVW MEDS BY RX/DR IN RCRD: CPT | Mod: CPTII,95,, | Performed by: PHYSICIAN ASSISTANT

## 2023-11-20 PROCEDURE — 99213 OFFICE O/P EST LOW 20 MIN: CPT | Mod: 95,,, | Performed by: PHYSICIAN ASSISTANT

## 2023-11-20 RX ORDER — PHENAZOPYRIDINE HYDROCHLORIDE 200 MG/1
200 TABLET, FILM COATED ORAL 3 TIMES DAILY PRN
Qty: 20 TABLET | Refills: 0 | Status: SHIPPED | OUTPATIENT
Start: 2023-11-20 | End: 2023-11-30

## 2023-11-20 RX ORDER — CIPROFLOXACIN 500 MG/1
500 TABLET ORAL 2 TIMES DAILY
Qty: 20 TABLET | Refills: 0 | Status: SHIPPED | OUTPATIENT
Start: 2023-11-20 | End: 2023-11-30

## 2023-11-20 RX ORDER — ALPRAZOLAM 0.5 MG/1
0.5 TABLET ORAL DAILY PRN
Qty: 10 TABLET | Refills: 0 | Status: CANCELLED | OUTPATIENT
Start: 2023-11-20 | End: 2023-12-20

## 2023-11-20 RX ORDER — ALPRAZOLAM 0.5 MG/1
0.5 TABLET ORAL DAILY PRN
Qty: 10 TABLET | Refills: 0 | Status: SHIPPED | OUTPATIENT
Start: 2023-11-20 | End: 2024-02-27 | Stop reason: SDUPTHER

## 2023-11-20 NOTE — PROGRESS NOTES
Answers submitted by the patient for this visit:  Painful Urination Questionnaire (Submitted on 11/20/2023)  Chief Complaint: Dysuria  Chronicity: recurrent  Onset: yesterday  Frequency: every urination  Progression since onset: gradually worsening  Pain quality: burning, stabbing  Pain - numeric: 8/10  Fever: 100 - 100.9 F  Fever duration: 1 - 2 days  Sexually active?: No  History of pyelonephritis?: No  chills: No  discharge: No  flank pain: Yes  frequency: Yes  hematuria: Yes  hesitancy: Yes  nausea: No  possible pregnancy: No  sweats: No  urgency: Yes  vomiting: No  constipation: Yes  rash: No  weight loss: No  withholding: No  behavior changes: Yes  Treatments tried: acetaminophen, home medications, increased fluids  Improvement on treatment: mild  Pain severity: moderate  catheterization: Yes  diabetes insipidus: No  diabetes mellitus: No  genitourinary reflux: No  hypertension: No  recurrent UTIs: Yes  single kidney: No  STD: No  urinary stasis: Yes  urological procedure: Yes  kidney stones: No

## 2023-11-20 NOTE — TELEPHONE ENCOUNTER
----- Message from Barb Pizano sent at 11/20/2023 11:03 AM CST -----  Contact: pt [phone]  Type: Needs Medical Advice  Who Called:  Cate JULIAN     Best Call Back Number: [phone]  Additional Information: Cate stated HH nurse took urine sample today and is requesting lab orders. Pls call back and advise

## 2023-11-20 NOTE — TELEPHONE ENCOUNTER
No care due was identified.  [facility] Embedded Care Due Messages. Reference number: [id number].   11/20/2023 10:50:34 AM CST

## 2023-11-21 NOTE — PROGRESS NOTES
Subjective     Patient ID: Nadeem Alegre is a 58 y.o. male.    Chief Complaint: No chief complaint on file.    The patient location is: [place], [place]  The chief complaint leading to consultation is: UTI    Visit type: audio only    Face to Face time with patient: 20 minutes of total time spent on the encounter, which includes face to face time and non-face to face time preparing to see the patient (eg, review of tests), Obtaining and/or reviewing separately obtained history, Documenting clinical information in the electronic or other health record, Independently interpreting results (not separately reported) and communicating results to the patient/family/caregiver, or Care coordination (not separately reported).         Each patient to whom he or she provides medical services by telemedicine is:  (1) informed of the relationship between the physician and patient and the respective role of any other health care provider with respect to management of the patient; and (2) notified that he or she may decline to receive medical services by telemedicine and may withdraw from such care at any time.    Notes:        Dysuria   This is a recurrent problem. The current episode started yesterday. The problem occurs every urination. The problem has been gradually worsening. The quality of the pain is described as burning and stabbing. The pain is at a severity of 8/10. The pain is moderate. The maximum temperature recorded prior to his arrival was 100 - 100.9 F. The fever has been present for 1 - 2 days. He is Not sexually active. There is No history of pyelonephritis. Associated symptoms include behavior changes, flank pain, frequency, hematuria, hesitancy, urgency and constipation. Pertinent negatives include no chills, discharge, nausea, possible pregnancy, sweats, vomiting, weight loss, rash or withholding. He has tried acetaminophen, home medications and increased fluids for the symptoms. The treatment provided mild  relief. His past medical history is significant for catheterization, recurrent UTIs, urinary stasis and a urological procedure. There is no history of diabetes insipidus, diabetes mellitus, genitourinary reflux, hypertension, kidney stones, a single kidney or STD.     Review of Systems   Constitutional:  Negative for chills and weight loss.   Gastrointestinal:  Positive for constipation. Negative for nausea and vomiting.   Genitourinary:  Positive for dysuria, flank pain, frequency, hematuria, hesitancy and urgency.   Integumentary:  Negative for rash.          Objective     Physical Exam  Constitutional:       General: He is not in acute distress.     Appearance: Normal appearance. He is not ill-appearing, toxic-appearing or diaphoretic.   Neurological:      Mental Status: He is alert.            Assessment and Plan     1. Anxiety  -     ALPRAZolam (XANAX) 0.5 MG tablet; Take 1 tablet (0.5 mg total) by mouth daily as needed for Anxiety.  Dispense: 10 tablet; Refill: 0    Other orders  -     ciprofloxacin HCl (CIPRO) 500 MG tablet; Take 1 tablet (500 mg total) by mouth 2 (two) times daily. for 10 days  Dispense: 20 tablet; Refill: 0  -     phenazopyridine (PYRIDIUM) 200 MG tablet; Take 1 tablet (200 mg total) by mouth 3 (three) times daily as needed for Pain.  Dispense: 20 tablet; Refill: 0

## 2023-11-22 NOTE — TELEPHONE ENCOUNTER
Tried to reach out to HH. Phone number provided goes to [facility].

## 2023-12-01 PROCEDURE — G0179 MD RECERTIFICATION HHA PT: HCPCS | Mod: ,,, | Performed by: INTERNAL MEDICINE

## 2023-12-01 PROCEDURE — G0179 PR HOME HEALTH MD RECERTIFICATION: ICD-10-PCS | Mod: ,,, | Performed by: INTERNAL MEDICINE

## 2023-12-05 ENCOUNTER — EXTERNAL HOME HEALTH (OUTPATIENT)
Dept: HOME HEALTH SERVICES | Facility: HOSPITAL | Age: 58
End: 2023-12-05
Payer: MEDICARE

## 2023-12-07 ENCOUNTER — DOCUMENT SCAN (OUTPATIENT)
Dept: HOME HEALTH SERVICES | Facility: HOSPITAL | Age: 58
End: 2023-12-07
Payer: MEDICARE

## 2024-01-09 ENCOUNTER — DOCUMENT SCAN (OUTPATIENT)
Dept: HOME HEALTH SERVICES | Facility: HOSPITAL | Age: 59
End: 2024-01-09
Payer: MEDICARE

## 2024-01-30 PROCEDURE — G0179 MD RECERTIFICATION HHA PT: HCPCS | Mod: ,,, | Performed by: INTERNAL MEDICINE

## 2024-02-06 ENCOUNTER — EXTERNAL HOME HEALTH (OUTPATIENT)
Dept: HOME HEALTH SERVICES | Facility: HOSPITAL | Age: 59
End: 2024-02-06
Payer: MEDICARE

## 2024-02-15 ENCOUNTER — DOCUMENT SCAN (OUTPATIENT)
Dept: HOME HEALTH SERVICES | Facility: HOSPITAL | Age: 59
End: 2024-02-15
Payer: MEDICARE

## 2024-02-22 ENCOUNTER — TELEPHONE (OUTPATIENT)
Dept: FAMILY MEDICINE | Facility: CLINIC | Age: 59
End: 2024-02-22
Payer: MEDICARE

## 2024-02-22 NOTE — TELEPHONE ENCOUNTER
Called Home health and spoke to Odalis    They stated they wanted LOV, here in [place] was in March. He had Virtual with Esther in [place].     Due to insurance he needs a visit done to go over health and medication to keep home health. It has to be with in 30 days.       Tried to call pt to get appointment virtually with Gill Schaefer on Wednesday 28th. kody

## 2024-02-22 NOTE — TELEPHONE ENCOUNTER
----- Message from Nadeem Barcenas sent at 2/22/2024  3:02 PM CST -----  Regarding: office notes  Contact: Home Health  Type:  Needs Medical Advice    Who Called: Odalis dietz/ [facility]        Would the patient rather a call back or a response via [facility]? Call back    Best Call Back Number: [phone]   [phone]    Additional Information: Sts she needs the last visits office notes faxed over for the records.   Please advise -- Thank you

## 2024-02-27 ENCOUNTER — OFFICE VISIT (OUTPATIENT)
Dept: FAMILY MEDICINE | Facility: CLINIC | Age: 59
End: 2024-02-27
Payer: MEDICARE

## 2024-02-27 DIAGNOSIS — L89.154 PRESSURE ULCER OF SACRAL REGION, STAGE 4: ICD-10-CM

## 2024-02-27 DIAGNOSIS — E66.01 MORBID OBESITY: ICD-10-CM

## 2024-02-27 DIAGNOSIS — D64.9 ANEMIA, UNSPECIFIED TYPE: ICD-10-CM

## 2024-02-27 DIAGNOSIS — R33.9 URINARY RETENTION: ICD-10-CM

## 2024-02-27 DIAGNOSIS — E78.2 MIXED HYPERLIPIDEMIA: ICD-10-CM

## 2024-02-27 DIAGNOSIS — G47.33 OBSTRUCTIVE SLEEP APNEA (ADULT) (PEDIATRIC): Chronic | ICD-10-CM

## 2024-02-27 DIAGNOSIS — I63.50 CEREBRAL ARTERY OCCLUSION WITH CEREBRAL INFARCTION: ICD-10-CM

## 2024-02-27 DIAGNOSIS — F41.9 ANXIETY: Primary | ICD-10-CM

## 2024-02-27 DIAGNOSIS — Z93.59 SUPRAPUBIC CATHETER: ICD-10-CM

## 2024-02-27 DIAGNOSIS — G81.94 LEFT HEMIPARESIS: ICD-10-CM

## 2024-02-27 DIAGNOSIS — I25.10 CORONARY ARTERY DISEASE INVOLVING NATIVE CORONARY ARTERY OF NATIVE HEART WITHOUT ANGINA PECTORIS: ICD-10-CM

## 2024-02-27 PROCEDURE — 99214 OFFICE O/P EST MOD 30 MIN: CPT | Mod: 95,,, | Performed by: PHYSICIAN ASSISTANT

## 2024-02-27 RX ORDER — ALPRAZOLAM 0.5 MG/1
0.5 TABLET ORAL DAILY PRN
Qty: 10 TABLET | Refills: 0 | Status: SHIPPED | OUTPATIENT
Start: 2024-02-27 | End: 2024-06-13 | Stop reason: SDUPTHER

## 2024-02-27 NOTE — PROGRESS NOTES
Subjective       Patient ID: Nadeem Alegre is a 58 y.o. male.    Patient location- [place], [place]  My location- [place], [place]  Technology used- Audiovisual  Total time for encounter- 15 minutes      Chief Complaint: check-in    HPI      Pt is new to me, PCP Dr. Seals.     Pt is a 58 year old male with chronic encephalopathy, hx of cerebral infarction with residual left hemiparesis, CAD, chronic urinary retention with suprapubic catheter placement, obesity, GERD, ALIYA with CPAP. He presents today for check-in. States sacral wound is worsening, but has wound care coming twice weekly to address. Needs refill of xanax. No new complaints, due for yearly labs.     Review of Systems   Constitutional:  Negative for activity change and unexpected weight change.   HENT:  Negative for hearing loss, rhinorrhea and trouble swallowing.    Eyes:  Negative for discharge and visual disturbance.   Respiratory:  Negative for chest tightness and wheezing.    Cardiovascular:  Negative for chest pain and palpitations.   Gastrointestinal:  Positive for constipation. Negative for blood in stool, diarrhea and vomiting.   Endocrine: Negative for polydipsia and polyuria.   Genitourinary:  Positive for difficulty urinating. Negative for hematuria and urgency.   Musculoskeletal:  Negative for arthralgias, joint swelling and neck pain.   Neurological:  Negative for weakness and headaches.   Psychiatric/Behavioral:  Positive for confusion. Negative for dysphoric mood.           Objective     Physical Exam  Constitutional:       General: He is not in acute distress.     Appearance: Normal appearance. He is not ill-appearing, toxic-appearing or diaphoretic.   HENT:      Head: Normocephalic and atraumatic.   Pulmonary:      Effort: No respiratory distress.   Neurological:      General: No focal deficit present.      Mental Status: He is alert and oriented to person, place, and time.   Psychiatric:         Mood and Affect: Mood normal.          Behavior: Behavior normal.         Thought Content: Thought content normal.         Judgment: Judgment normal.         This exam is limited, as this is a virtual visit.     1. Anxiety  -refill xanax,  compliant     2. Mixed hyperlipidemia  - Comprehensive Metabolic Panel; Future  - Lipid Panel; Future  - TSH; Future    3. Anemia, unspecified type  - CBC Auto Differential; Future  - IRON AND TIBC; Future  - Ferritin; Future    4. Left hemiparesis    5. Cerebral artery occlusion with cerebral infarction    6. Urinary retention    7. Suprapubic catheter  -continue with home health    8. Obstructive sleep apnea (adult)    9. Pressure ulcer of sacral region, stage 4  -continue with wound care    10. Morbid obesity    11. CAD without angina    Rtc/ER precautions given. F/U 3 months or sooner prn    Gill Schaefer PA-C

## 2024-03-16 ENCOUNTER — DOCUMENT SCAN (OUTPATIENT)
Dept: HOME HEALTH SERVICES | Facility: HOSPITAL | Age: 59
End: 2024-03-16
Payer: MEDICARE

## 2024-03-30 PROCEDURE — G0179 MD RECERTIFICATION HHA PT: HCPCS | Mod: ,,, | Performed by: INTERNAL MEDICINE

## 2024-04-03 ENCOUNTER — DOCUMENT SCAN (OUTPATIENT)
Dept: HOME HEALTH SERVICES | Facility: HOSPITAL | Age: 59
End: 2024-04-03
Payer: MEDICARE

## 2024-04-12 ENCOUNTER — EXTERNAL HOME HEALTH (OUTPATIENT)
Dept: HOME HEALTH SERVICES | Facility: HOSPITAL | Age: 59
End: 2024-04-12
Payer: MEDICARE

## 2024-05-09 ENCOUNTER — DOCUMENT SCAN (OUTPATIENT)
Dept: HOME HEALTH SERVICES | Facility: HOSPITAL | Age: 59
End: 2024-05-09
Payer: MEDICARE

## 2024-05-09 ENCOUNTER — PATIENT OUTREACH (OUTPATIENT)
Dept: ADMINISTRATIVE | Facility: HOSPITAL | Age: 59
End: 2024-05-09
Payer: MEDICARE

## 2024-05-09 NOTE — PROGRESS NOTES
Population Health Chart Review & Patient Outreach Details      Additional [facility] Health Notes:               Updates Requested / Reviewed:      Updated Care Coordination Note         Health Maintenance Topics Overdue:      [facility] Score: 1     Hemoglobin A1c                       Health Maintenance Topic(s) Outreach Outcomes & Actions Taken:    Medication Adherence / Statins - Outreach Outcomes & Actions Taken  : Added to upcoming appt note to discuss

## 2024-05-10 ENCOUNTER — PATIENT MESSAGE (OUTPATIENT)
Dept: FAMILY MEDICINE | Facility: CLINIC | Age: 59
End: 2024-05-10
Payer: MEDICARE

## 2024-05-10 ENCOUNTER — TELEPHONE (OUTPATIENT)
Dept: PEDIATRICS | Facility: CLINIC | Age: 59
End: 2024-05-10
Payer: MEDICARE

## 2024-05-10 NOTE — TELEPHONE ENCOUNTER
----- Message from Ana Porter sent at 5/10/2024  3:03 PM CDT -----  Contact: patient wife  Type:  Needs Medical Advice    Who Called: patient wifeMely     Would the patient rather a call back or a response via [facility]? Call     Best Call Back Number: [phone]    Additional Information: patient's wifeMely would like to speak with the nurse in regards to getting a TB test ordered.     Please call to advise

## 2024-05-13 ENCOUNTER — PATIENT MESSAGE (OUTPATIENT)
Dept: FAMILY MEDICINE | Facility: CLINIC | Age: 59
End: 2024-05-13
Payer: MEDICARE

## 2024-05-13 ENCOUNTER — TELEPHONE (OUTPATIENT)
Dept: FAMILY MEDICINE | Facility: CLINIC | Age: 59
End: 2024-05-13
Payer: MEDICARE

## 2024-05-13 DIAGNOSIS — Z11.1 TUBERCULOSIS SCREENING: Primary | ICD-10-CM

## 2024-05-13 NOTE — TELEPHONE ENCOUNTER
----- Message from Elvira Jackson sent at 5/13/2024 12:46 PM CDT -----  Type:  Needs Medical Advice     Who Called: Mely (wife)    Would the patient rather a call back or a response via [facility]? Call back     Best Call Back Number: [phone]    Additional Information: Pt wife is requesting a call back to get an update on TB order for her . He needs to have the TB done today. Please advise     Thanks!

## 2024-05-13 NOTE — TELEPHONE ENCOUNTER
----- Message from Elvira Jackson sent at 5/13/2024  1:02 PM CDT -----  Type:  Needs Medical Advice      Who Called: Mely (wife)     Would the patient rather a call back or a response via [facility]? Call back     Best Call Back Number: [phone]     Additional Information: Pt wife is requesting a call back to get an update on TB order for her . He needs to have the TB done today. Please advise     Thanks!

## 2024-05-16 ENCOUNTER — DOCUMENT SCAN (OUTPATIENT)
Dept: HOME HEALTH SERVICES | Facility: HOSPITAL | Age: 59
End: 2024-05-16
Payer: MEDICARE

## 2024-05-31 ENCOUNTER — TELEPHONE (OUTPATIENT)
Dept: FAMILY MEDICINE | Facility: CLINIC | Age: 59
End: 2024-05-31
Payer: MEDICARE

## 2024-05-31 NOTE — TELEPHONE ENCOUNTER
----- Message from Dave Grimaldo sent at 5/31/2024 12:06 PM CDT -----  Regarding: home health  Contact: Odalis with [facility]  Type:  Patient Returning Call    Who Called:Odalis with [facility]  Who Left Message for Patient:office nurse  Does the patient know what this is regarding?:patient is home from [facility] and need orders to admit/ also recent clinic notes  Would the patient rather a call back or a response via [facility]? Patient may also need office visit virtual or in office  Best Call Back Number:[phone]  Additional Information: fax# [phone]

## 2024-06-10 ENCOUNTER — PATIENT MESSAGE (OUTPATIENT)
Dept: FAMILY MEDICINE | Facility: CLINIC | Age: 59
End: 2024-06-10
Payer: MEDICARE

## 2024-06-13 ENCOUNTER — OFFICE VISIT (OUTPATIENT)
Dept: FAMILY MEDICINE | Facility: CLINIC | Age: 59
End: 2024-06-13
Payer: MEDICARE

## 2024-06-13 DIAGNOSIS — G93.40 ENCEPHALOPATHY: ICD-10-CM

## 2024-06-13 DIAGNOSIS — F41.9 ANXIETY: ICD-10-CM

## 2024-06-13 DIAGNOSIS — N30.00 ACUTE CYSTITIS WITHOUT HEMATURIA: Primary | ICD-10-CM

## 2024-06-13 DIAGNOSIS — G81.94 LEFT HEMIPARESIS: ICD-10-CM

## 2024-06-13 DIAGNOSIS — Z74.01 BEDBOUND: ICD-10-CM

## 2024-06-13 DIAGNOSIS — R30.0 DYSURIA: ICD-10-CM

## 2024-06-13 PROCEDURE — 1160F RVW MEDS BY RX/DR IN RCRD: CPT | Mod: CPTII,95,, | Performed by: PHYSICIAN ASSISTANT

## 2024-06-13 PROCEDURE — 1159F MED LIST DOCD IN RCRD: CPT | Mod: CPTII,95,, | Performed by: PHYSICIAN ASSISTANT

## 2024-06-13 PROCEDURE — 99214 OFFICE O/P EST MOD 30 MIN: CPT | Mod: 95,,, | Performed by: PHYSICIAN ASSISTANT

## 2024-06-13 RX ORDER — CIPROFLOXACIN 500 MG/1
500 TABLET ORAL EVERY 12 HOURS
Qty: 14 TABLET | Refills: 0 | Status: SHIPPED | OUTPATIENT
Start: 2024-06-13 | End: 2024-06-20

## 2024-06-13 RX ORDER — ALPRAZOLAM 0.5 MG/1
0.5 TABLET ORAL DAILY PRN
Qty: 10 TABLET | Refills: 0 | Status: SHIPPED | OUTPATIENT
Start: 2024-06-13 | End: 2024-07-13

## 2024-06-13 NOTE — PATIENT INSTRUCTIONS
A few reminders from today:    Please complete urine sample  Cipro as prescribed  Home health ordered placed  Drink plenty of water  Xanax refilled as needed    Follow up with me if needed.   Please go to ER/urgent care if after hours or symptoms persist/worsen.     Do not hesitate to get in touch with me should you have any further questions.     Thank you for trusting me with your care!  I wish you health and happiness.    -Gill Schaefer PA-C

## 2024-06-13 NOTE — PROGRESS NOTES
Subjective       Patient ID: Nadeem Alegre is a 59 y.o. male.    Patient location-   My location- [place], [place]  Technology used- Audiovisual  Total time for encounter- minutes      Chief Complaint: needs home health    Dysuria   This is a new problem. The current episode started yesterday. The problem occurs intermittently. The problem has been gradually worsening. The quality of the pain is described as burning and stabbing. The pain is at a severity of 6/10. The pain is moderate. The maximum temperature recorded prior to his arrival was 100 - 100.9 F. The fever has been present for Less than 1 day. He is Not sexually active. There is No history of pyelonephritis. Associated symptoms include behavior changes, a discharge, hematuria and urgency. Pertinent negatives include no chills, flank pain, frequency, hesitancy, nausea, possible pregnancy, sweats, vomiting, weight loss, constipation, rash or withholding. He has tried home medications and increased fluids for the symptoms. The treatment provided mild relief. His past medical history is significant for catheterization, recurrent UTIs and a urological procedure. There is no history of diabetes insipidus, diabetes mellitus, genitourinary reflux, hypertension, kidney stones, a single kidney, STD or urinary stasis.         Pt is new to me, PCP Dr. Seals.      Pt is a 58 year old male with chronic encephalopathy, hx of cerebral infarction with residual left hemiparesis, CAD, chronic urinary retention with suprapubic catheter placement, obesity, GERD, ALIYA with CPAP. He presents today requesting renewed home health orders. Pt stayed in a nursing home for 2 weeks to get a break from home. This is part of his [employer] benefits. Upon leaving, he was discharged from home health and needs new orders. Pt's wife also states that since yesterday, pt has seemed a bit more confused than baseline and has purulence in his urine, which is drained via a suprapubic catheter. States he  had temp of 101 last night, current temp is 98.3. This cath was placed 5/13 and is due for change by home health. Home health can also collect urine sample for us. Pt needs refill of the xanax that he uses sparingly.       Review of Systems   Constitutional:  Negative for chills and weight loss.   Gastrointestinal:  Negative for constipation, nausea and vomiting.   Genitourinary:  Positive for dysuria, hematuria and urgency. Negative for flank pain, frequency and hesitancy.   Integumentary:  Negative for rash.   All other systems reviewed and are negative.         Objective     Physical Exam  Constitutional:       General: He is not in acute distress.     Appearance: Normal appearance. He is ill-appearing (chronic). He is not toxic-appearing or diaphoretic.   HENT:      Head: Normocephalic and atraumatic.   Pulmonary:      Effort: No respiratory distress.   Skin:     Comments: Wife showed me his suprapubic catheter exit and no erythema or discharge at site   Neurological:      General: No focal deficit present.      Mental Status: He is alert and oriented to person, place, and time. Mental status is at baseline.      Comments: Answers my questions without difficulty   Psychiatric:         Mood and Affect: Mood normal.         Behavior: Behavior normal.         Thought Content: Thought content normal.         Judgment: Judgment normal.         This exam is limited, as this is a virtual visit.     1. Acute cystitis without hematuria  - ciprofloxacin HCl (CIPRO) 500 MG tablet; Take 1 tablet (500 mg total) by mouth every 12 (twelve) hours. for 7 days  Dispense: 14 tablet; Refill: 0  - Ambulatory referral/consult to Home Health; Future    2. Dysuria  - Urinalysis; Future  - CULTURE, URINE; Future    3. Encephalopathy    4. Left hemiparesis    5. Bedbound  - Ambulatory referral/consult to Home Health; Future    6. Anxiety  - ALPRAZolam (XANAX) 0.5 MG tablet; Take 1 tablet (0.5 mg total) by mouth daily as needed for  Anxiety.  Dispense: 10 tablet; Refill: 0    Encouraged in person follow up with pcp asap. F/U if symptoms persist or worsen. ER precautions given    Gill Schaefer PA-C

## 2024-06-25 ENCOUNTER — OFFICE VISIT (OUTPATIENT)
Dept: FAMILY MEDICINE | Facility: CLINIC | Age: 59
End: 2024-06-25
Payer: MEDICARE

## 2024-06-25 DIAGNOSIS — N39.0 URINARY TRACT INFECTION ASSOCIATED WITH INDWELLING URETHRAL CATHETER, SUBSEQUENT ENCOUNTER: Primary | ICD-10-CM

## 2024-06-25 DIAGNOSIS — T83.511D URINARY TRACT INFECTION ASSOCIATED WITH INDWELLING URETHRAL CATHETER, SUBSEQUENT ENCOUNTER: Primary | ICD-10-CM

## 2024-06-25 PROCEDURE — 99499 UNLISTED E&M SERVICE: CPT | Mod: 95,,, | Performed by: INTERNAL MEDICINE

## 2024-06-25 NOTE — PROGRESS NOTES
Patient cancelled Virtual Visit     Answers submitted by the patient for this visit:  Review of Systems Questionnaire (Submitted on 6/25/2024)  activity change: No  unexpected weight change: No  neck pain: Yes  hearing loss: Yes  rhinorrhea: No  trouble swallowing: No  eye discharge: No  visual disturbance: No  chest tightness: No  wheezing: No  chest pain: No  palpitations: No  blood in stool: No  constipation: Yes  vomiting: No  diarrhea: No  polydipsia: No  polyuria: No  difficulty urinating: No  urgency: No  hematuria: No  joint swelling: No  arthralgias: Yes  headaches: No  weakness: Yes  confusion: Yes  dysphoric mood: No

## 2024-06-28 ENCOUNTER — CARE AT HOME (OUTPATIENT)
Dept: HOME HEALTH SERVICES | Facility: CLINIC | Age: 59
End: 2024-06-28
Payer: MEDICARE

## 2024-06-28 ENCOUNTER — EXTERNAL HOME HEALTH (OUTPATIENT)
Dept: HOME HEALTH SERVICES | Facility: HOSPITAL | Age: 59
End: 2024-06-28
Payer: MEDICARE

## 2024-06-28 DIAGNOSIS — Z93.59 SUPRAPUBIC CATHETER: ICD-10-CM

## 2024-06-28 DIAGNOSIS — G81.94 LEFT HEMIPARESIS: ICD-10-CM

## 2024-06-28 DIAGNOSIS — L89.154 PRESSURE ULCER OF SACRAL REGION, STAGE 4: ICD-10-CM

## 2024-06-28 DIAGNOSIS — E66.01 MORBID OBESITY: ICD-10-CM

## 2024-06-28 DIAGNOSIS — R53.81 DEBILITY: ICD-10-CM

## 2024-06-28 DIAGNOSIS — R33.9 URINARY RETENTION: Primary | ICD-10-CM

## 2024-06-28 PROCEDURE — 3075F SYST BP GE 130 - 139MM HG: CPT | Mod: CPTII,S$GLB,, | Performed by: NURSE PRACTITIONER

## 2024-06-28 PROCEDURE — 1159F MED LIST DOCD IN RCRD: CPT | Mod: CPTII,S$GLB,, | Performed by: NURSE PRACTITIONER

## 2024-06-28 PROCEDURE — 99349 HOME/RES VST EST MOD MDM 40: CPT | Mod: S$GLB,,, | Performed by: NURSE PRACTITIONER

## 2024-06-28 PROCEDURE — 3078F DIAST BP <80 MM HG: CPT | Mod: CPTII,S$GLB,, | Performed by: NURSE PRACTITIONER

## 2024-06-28 PROCEDURE — 1160F RVW MEDS BY RX/DR IN RCRD: CPT | Mod: CPTII,S$GLB,, | Performed by: NURSE PRACTITIONER

## 2024-07-01 VITALS
TEMPERATURE: 99 F | OXYGEN SATURATION: 97 % | RESPIRATION RATE: 20 BRPM | SYSTOLIC BLOOD PRESSURE: 130 MMHG | DIASTOLIC BLOOD PRESSURE: 70 MMHG | HEART RATE: 84 BPM

## 2024-07-01 NOTE — PROGRESS NOTES
"[facility]  Medical Chronic Care Home Visit    Encounter Provider: Jailene Oviedo   PCP: Tanner Seals MD  Consult Requested By: Gill Schaefer    HISTORY OF PRESENT ILLNESS      Patient ID: Nadeem Alegre is a 59 y.o. male is being seen in the home due to physical debility that presents a taxing effort to leave the home, to mitigate high risk of hospital readmission and/or due to the limited availability of reliable or safe options for transportation to the point of access to the provider. Prior to treatment on this visit the chart was reviewed and patient verbal consent was obtained.    Chronic medical conditions synopsis:  Nadeem Alegre is a 59 y.o.male with medical/surgical history including encephalitis, chronic disability, nonambulatory, anxiety, anemia, hypertension, hyperlipidemia, Hemiplegia and hemiparesis following cerebral infarction affecting left non-dominant side, chronic pressure ulcer of sacral region, stage 4, Atherosclerotic heart disease of native coronary artery with other forms of angina pectoris, mixed conductive and sensorineural hearing loss, neuromuscular dysfunction of bladder-chronic indwelling catheter via supra pubic site, neurogenic bowel,personal history of urinary (tract)infections, pseudobulbar affect, insomnia, obstructive sleep apnea.     With this [facility] NP visit patient is found lying in a hospital bed, awake, alert but minimally interactive. Spouse, Mely, is present and states she is primary caregiver of patient as he is maximally dependent on all ADLs following an ear infection that "infected his brain" when he was 51 yrs old. He is bed bound but can get into a speciality chair with maximum assistance. He has a chronic indwelling mike via suprapubic cath site-recently treated for UTI by PCP 6/13/24 via virtual appt as it is extremely difficult to get him out of the home for MD appts. Wife states patient completed oral antibiotics and has s/s of " "infection. When he does get a UTI, Mely states patient becomes combative.    Patient is followed by [facility] RN for mike care and wound care to chronic coccyx pressure injury that "does not heal"-has been open for at least last 8 years per wife. HH performs wound care but is seeking more assistance to get it closed per spouse. Will send over referral to Wound  NP for in home wound management.    No distress noted. [facility] NP will follow patient every 8-12 weeks and prn due to difficulty leaving home. Program contact information provided to patient and left in home.          DECISION MAKING TODAY       Assessment & Plan:  1. Urinary retention  Assessment & Plan:  Chronic, related to CVA.  Has chronic indwelling cath via supra pubic site.  Followed by  for mike care.  Hx of UTIs-recently treated 6/13/24 for UTI.        2. Left hemiparesis  Assessment & Plan:  Chronic, S/p CVA.  ADL dependent on wife.  Has chronic sacral/coccyx area pressure ulcer for years.  Continue home flexeril, robaxin.  Followed by PCP.       Orders:  -     Ambulatory referral/consult to [facility] - Medical  -     HME - OTHER    3. Suprapubic catheter  Overview:  Suprapubic     Assessment & Plan:  Chronic, related to CVA.  Has chronic indwelling cath via supra pubic site.  Followed by  for mike care and Urology.  Hx of UTIs-recently treated 6/13/24 for UTI.        4. Morbid obesity  Assessment & Plan:  Unable to stand for weight but last reported weight 255 lbs. Pt non-ambulatory, bed bound/specialty chair bound, unable to perform any exercises due to left hemiparesis.  Morbid obesity compounds patient co-morbidities and complicates treatment course. Counseling given regarding diet modification and exercise recommendations.      5. Pressure ulcer of sacral region, stage 4  Assessment & Plan:  Chronic, present for "years" per wife.  [facility] performing wound care but wound does not ever " close up.   Bed bound/low air loss mattress/sheila lift/specialty motorized chair DME use.  Wound visualized today-no s/s wound infection.  Continue HH, will consult Wound  for in home NP eval.    Orders:  -     HME - OTHER    6. Debility  Assessment & Plan:  Chronic, ADL dependent, due to CVA following ear infection, s/p mastoidectomy, CVA per wife at age 49.   Wife provides care.  Maximal DME use daily.    Orders:  -     HME - OTHER           ENVIRONMENT OF CARE      Family and/or Caregiver present at visit?  Yes  Name of Caregiver: spouse, Mely  History provided by: caregiver    4Ms for Medical Decision-Making in Older Adults    Last Completed EAWV: None    MOBILITY:  Get Up and Go:       No data to display              Activities of Daily Living:       No data to display              Whisper Test:       No data to display              Disability Status:      4/13/2017     1:17 AM   Disability Status   Are you deaf or do you have serious difficulty hearing? N   Are you blind or do you have serious difficulty seeing, even when wearing glasses? N   Because of a physical, mental, or emotional condition, do you have serious difficulty concentrating, remembering, or making decisions? N   Do you have serious difficulty walking or climbing stairs? Y   Do you have difficulty dressing or bathing? Y   Because of a physical, mental, or emotional condition, do you have difficulty doing errands alone such as visiting a doctor's office or shopping? Y     Nutrition Screening:       No data to display             Screening Score: 0-7 Malnourished, 8-11 At Risk, 12-14 Normal    MENTATION:   Depression Patient Health Questionnaire:      3/16/2023     1:57 PM   Depression Patient Health Questionnaire   Over the last two weeks how often have you been bothered by little interest or pleasure in doing things Not at all   Over the last two weeks how often have you been bothered by feeling down, depressed or  hopeless Not at all   PHQ-2 Total Score 0     Has Dementia Dx: No    Cognitive Function Screening:       No data to display              Cognitive Function Screening Total - Less than 4 = Abnormal,  Greater than or equal to 4 = Normal    MEDICATIONS:  High Risk Medications:  Total Active Medications: 2  ALPRAZolam - 0.5 MG  cyclobenzaprine - 10 MG    WHAT MATTERS MOST:  Advance Care Planning   ACP Status:   Patient has had an ACP conversation  Living Will: No  Power of : No  [place]: No    What is most important right now is to focus on spending time at home, avoiding the hospital, remaining as independent as possible, symptom/pain control, and comfort and QOL     Accordingly, we have decided that the best plan to meet the patient's goals includes continuing with treatment      What matters most to patient today is: patient did not state           Is patient hospice appropriate: No  (If needed, use PPS <30 or FAST score >7)  Was referral to hospice placed: No    Impression upon entering the home:  Physical Dwelling: single family home   Appearance of home environment: cleaniness: cluttered, walking pathways: cluttered, lighting: adequate, and home structure: visible structural issues  Functional Status: max assistance  Mobility: bedbound  Nutritional access: adequate intake and access  Home Health: Yes, [facility]    DME/Supplies: hospital bed, sheila lift and specialty wheelchair     Diagnostic tests reviewed/disposition: No diagnosic tests pending after this hospitalization.  Disease/illness education:  debility, chronic mike, recent UTI, coccyx wound  Establishment or re-establishment of referral orders for community resources: No other necessary community resources.   Discussion with other health care providers: No discussion with other health care providers necessary.   Does patient have a PCP at [facility]? Yes   Repatriation plan with PCP? [facility] reason: mobility   Does patient have an ostomy  (ileostomy, colostomy, suprapubic catheter, nephrostomy tube, tracheostomy, PEG tube, pleurex catheter, cholecystostomy, etc)? Yes. Is it on problem list?yes  Were BPAs reviewed? Yes    Social History     Socioeconomic History    Marital status:    Occupational History     Employer: [employer]   Tobacco Use    Smoking status: Former     Current packs/day: 0.00     Average packs/day: 0.5 packs/day for 18.0 years (9.0 ttl pk-yrs)     Types: Cigarettes     Start date: 10/28/1996     Quit date: 10/28/2014     Years since quittin.6    Smokeless tobacco: Never   Substance and Sexual Activity    Alcohol use: Yes     Alcohol/week: 5.0 standard drinks of alcohol     Types: 6 Standard drinks or equivalent per week     Comment: occ.    Drug use: Never     Social Determinants of Health     Financial Resource Strain: Medium Risk (2023)    Overall Financial Resource Strain (CARDIA)     Difficulty of Paying Living Expenses: Somewhat hard   Food Insecurity: Patient Declined (2023)    Hunger Vital Sign     Worried About Running Out of Food in the Last Year: Patient declined     Ran Out of Food in the Last Year: Patient declined   Transportation Needs: Patient Declined (2023)    PRAPARE - Transportation     Lack of Transportation (Medical): Patient declined     Lack of Transportation (Non-Medical): Patient declined   Physical Activity: Inactive (2023)    Exercise Vital Sign     Days of Exercise per Week: 0 days     Minutes of Exercise per Session: 0 min   Stress: Stress Concern Present (2023)    [employer] [place] - Occupational Stress Questionnaire     Feeling of Stress : To some extent   Housing Stability: High Risk (2023)    Housing Stability Vital Sign     Unable to Pay for Housing in the Last Year: Yes     Number of Places Lived in the Last Year: 1     Unstable Housing in the Last Year: No         OBJECTIVE:     Vital Signs:  Vitals:    24 1330   BP: 130/70    Pulse: 84   Resp: 20   Temp: 98.6 °F (37 °C)       Review of Systems   Constitutional:  Positive for activity change, appetite change and fatigue. Negative for fever and unexpected weight change.   HENT: Negative.     Eyes: Negative.    Respiratory: Negative.     Cardiovascular: Negative.    Gastrointestinal: Negative.    Endocrine: Negative.    Genitourinary:         Has suprapubic mike cath-chronic   Musculoskeletal:  Positive for back pain.       Physical Exam:  Physical Exam  Constitutional:       General: He is not in acute distress.     Appearance: He is obese. He is not ill-appearing.      Comments: Minimally interactive   HENT:      Head: Normocephalic and atraumatic.      Right Ear: External ear normal.      Left Ear: External ear normal.      Nose: Nose normal.      Mouth/Throat:      Mouth: Mucous membranes are dry.      Pharynx: Oropharynx is clear.   Eyes:      Extraocular Movements: Extraocular movements intact.      Conjunctiva/sclera: Conjunctivae normal.      Pupils: Pupils are equal, round, and reactive to light.   Cardiovascular:      Rate and Rhythm: Normal rate and regular rhythm.      Pulses: Normal pulses.      Heart sounds: Normal heart sounds.   Pulmonary:      Effort: Pulmonary effort is normal.      Breath sounds: Normal breath sounds.   Abdominal:      General: Bowel sounds are normal.      Palpations: Abdomen is soft.   Genitourinary:     Comments: 16 Fr mike in place via suprapubic site, WNL. Urine appears clear, yellow.  Musculoskeletal:      Cervical back: Neck supple.      Comments: Generalized atrophy   Skin:     General: Skin is warm and dry.      Capillary Refill: Capillary refill takes 2 to 3 seconds.      Comments: Chronic coccyx pressure injury (present for years). No s/s infection but non-healing.   Neurological:      Mental Status: He is alert.      Motor: Weakness present.      Comments: Oriented to person and place, forgetful  Bed bound mostly gets into specialty chair     Psychiatric:      Comments: Flat affect, minimally interactive         Coccyx pressure injury-chronic-years      INSTRUCTIONS FOR PATIENT:     Scheduled Follow-up, Appts Reviewed with Modifications if Needed: Yes  Future Appointments   Date Time Provider Department Center   9/4/2024  4:00 PM Tanner Seals MD [facility]   9/9/2024  4:40 PM Tanner Seals MD [facility]         Current Outpatient Medications:     ALPRAZolam (XANAX) 0.5 MG tablet, Take 1 tablet (0.5 mg total) by mouth daily as needed for Anxiety., Disp: 10 tablet, Rfl: 0    ascorbic Acid (VITAMIN C) 500 mg CpSR, Vitamin C 500 mg capsule,extended release  Take 1 capsule twice a day by oral route., Disp: , Rfl:     CRANBERRY FRUIT EXTRACT (CRANBERRY CONCENTRATE ORAL), Take 1 oz by mouth every evening., Disp: , Rfl:     cyclobenzaprine (FLEXERIL) 10 MG tablet, Take 10 mg by mouth 3 (three) times daily as needed for Muscle spasms., Disp: , Rfl:     docusate sodium (COLACE) 100 MG capsule, Take 200 mg by mouth 2 (two) times daily. , Disp: , Rfl:     fish oil-omega-3 fatty acids 300-1,000 mg capsule, Take 2 g by mouth 2 (two) times daily. , Disp: , Rfl:     ibuprofen (ADVIL,MOTRIN) 800 MG tablet, Take 1 tablet (800 mg total) by mouth every 8 (eight) hours as needed for Pain., Disp: 90 tablet, Rfl: 5    magnesium oxide (MAG-OX) 400 mg tablet, Take 400 mg by mouth 2 (two) times daily. , Disp: , Rfl:     melatonin 5 mg Tab, Take by mouth nightly as needed. , Disp: , Rfl:     multivitamin (THERAGRAN) per tablet, Take 1 tablet by mouth once daily., Disp: , Rfl:     omeprazole (PRILOSEC) 20 MG capsule, Take 20 mg by mouth once daily., Disp: , Rfl:     ondansetron (ZOFRAN-ODT) 8 MG TbDL, Take 1 tablet (8 mg total) by mouth every 8 (eight) hours as needed., Disp: 10 tablet, Rfl: 2    polyethylene glycol (GLYCOLAX) 17 gram PwPk, Take 17 g by mouth 2 (two) times daily., Disp: 100 each, Rfl: 6    senna (SENOKOT) 8.6 mg tablet, Take 2 tablets  by mouth 2 (two) times daily. , Disp: , Rfl:     vitamin E 100 UNIT capsule, Take 100 Units by mouth once daily., Disp: , Rfl:     zinc gluconate 50 mg tablet, Take 50 mg by mouth once daily., Disp: , Rfl:     albuterol (VENTOLIN HFA) 90 mcg/actuation inhaler, Inhale 2 puffs into the lungs every 4 (four) hours as needed for Wheezing or Shortness of Breath. Rescue, Disp: 18 g, Rfl: 3    cetirizine (ZYRTEC) 1 mg/mL syrup, Take 10 mLs (10 mg total) by mouth once daily., Disp: 473 mL, Rfl: 0    Medication Reconciliation:  Were medications changed during this appointment? No  Were medications in the home? Yes  Is the patient taking the medications as directed? Yes  Does the patient/caregiver understand the medications and changes? Yes  Does updated med list accurately reflects meds patient is currently taking? Yes    I spent a total of 35 minutes on the day of the visit.This includes face to face time and non-face to face time preparing to see the patient (eg, review of tests), obtaining and/or reviewing separately obtained history, documenting clinical information in the electronic or other health record, independently interpreting results and communicating results to the patient/family/caregiver, or care coordinator.        Signature: Jailene Oviedo NP

## 2024-07-03 ENCOUNTER — TELEPHONE (OUTPATIENT)
Dept: HOME HEALTH SERVICES | Facility: CLINIC | Age: 59
End: 2024-07-03
Payer: MEDICARE

## 2024-07-03 NOTE — ASSESSMENT & PLAN NOTE
Chronic, S/p CVA.  ADL dependent on wife.  Has chronic sacral/coccyx area pressure ulcer for years.  Continue home flexeril, robaxin.  Followed by PCP.

## 2024-07-03 NOTE — ASSESSMENT & PLAN NOTE
Chronic, related to CVA.  Has chronic indwelling cath via supra pubic site.  Followed by HH for mike care.  Hx of UTIs-recently treated 6/13/24 for UTI.

## 2024-07-03 NOTE — ASSESSMENT & PLAN NOTE
Chronic, ADL dependent, due to CVA following ear infection, s/p mastoidectomy, CVA per wife at age 49.   Wife provides care.  Maximal DME use daily.

## 2024-07-03 NOTE — TELEPHONE ENCOUNTER
Faxed orders, notes, and demographics to [facility] for In home wound care services per NP request.  Fax confirmation received.

## 2024-07-03 NOTE — ASSESSMENT & PLAN NOTE
Unable to stand for weight but last reported weight 255 lbs. Pt non-ambulatory, bed bound/specialty chair bound, unable to perform any exercises due to left hemiparesis.  Morbid obesity compounds patient co-morbidities and complicates treatment course. Counseling given regarding diet modification and exercise recommendations.

## 2024-07-03 NOTE — ASSESSMENT & PLAN NOTE
"Chronic, present for "years" per wife.  [facility] HH performing wound care but wound does not ever close up.   Bed bound/low air loss mattress/sheila lift/specialty motorized chair DME use.  Wound visualized today-no s/s wound infection.  Continue HH, will consult Wound  for in home NP eval.  "

## 2024-07-03 NOTE — ASSESSMENT & PLAN NOTE
Chronic, related to CVA.  Has chronic indwelling cath via supra pubic site.  Followed by HH for mike care and Urology.  Hx of UTIs-recently treated 6/13/24 for UTI.

## 2024-07-23 ENCOUNTER — OFFICE VISIT (OUTPATIENT)
Dept: PRIMARY CARE CLINIC | Facility: CLINIC | Age: 59
End: 2024-07-23
Payer: MEDICARE

## 2024-07-23 DIAGNOSIS — J06.9 UPPER RESPIRATORY TRACT INFECTION, UNSPECIFIED TYPE: Primary | ICD-10-CM

## 2024-07-23 DIAGNOSIS — N39.0 URINARY TRACT INFECTION ASSOCIATED WITH INDWELLING URETHRAL CATHETER, INITIAL ENCOUNTER: ICD-10-CM

## 2024-07-23 DIAGNOSIS — F41.9 ANXIETY: ICD-10-CM

## 2024-07-23 DIAGNOSIS — T83.511A URINARY TRACT INFECTION ASSOCIATED WITH INDWELLING URETHRAL CATHETER, INITIAL ENCOUNTER: ICD-10-CM

## 2024-07-23 PROCEDURE — 1159F MED LIST DOCD IN RCRD: CPT | Mod: CPTII,95,, | Performed by: PHYSICIAN ASSISTANT

## 2024-07-23 PROCEDURE — 1160F RVW MEDS BY RX/DR IN RCRD: CPT | Mod: CPTII,95,, | Performed by: PHYSICIAN ASSISTANT

## 2024-07-23 PROCEDURE — 99213 OFFICE O/P EST LOW 20 MIN: CPT | Mod: 95,,, | Performed by: PHYSICIAN ASSISTANT

## 2024-07-23 RX ORDER — PHENAZOPYRIDINE HYDROCHLORIDE 100 MG/1
100 TABLET, FILM COATED ORAL 3 TIMES DAILY PRN
Qty: 30 TABLET | Refills: 0 | Status: ON HOLD | OUTPATIENT
Start: 2024-07-23 | End: 2024-08-02

## 2024-07-23 RX ORDER — ALPRAZOLAM 0.5 MG/1
0.5 TABLET ORAL 2 TIMES DAILY PRN
Qty: 30 TABLET | Refills: 0 | Status: ON HOLD | OUTPATIENT
Start: 2024-07-23 | End: 2024-08-22

## 2024-07-23 RX ORDER — CIPROFLOXACIN 500 MG/1
500 TABLET ORAL 2 TIMES DAILY
Qty: 20 TABLET | Refills: 0 | Status: ON HOLD | OUTPATIENT
Start: 2024-07-23 | End: 2024-08-02

## 2024-07-23 NOTE — PROGRESS NOTES
Subjective     Patient ID: Nadeem Alegre is a 59 y.o. male.    Chief Complaint: No chief complaint on file.    The patient location is: [place], [place]  The chief complaint leading to consultation is: uti    Visit type: audiovisual    Face to Face time with patient: 20 minutes of total time spent on the encounter, which includes face to face time and non-face to face time preparing to see the patient (eg, review of tests), Obtaining and/or reviewing separately obtained history, Documenting clinical information in the electronic or other health record, Independently interpreting results (not separately reported) and communicating results to the patient/family/caregiver, or Care coordination (not separately reported).         Each patient to whom he or she provides medical services by telemedicine is:  (1) informed of the relationship between the physician and patient and the respective role of any other health care provider with respect to management of the patient; and (2) notified that he or she may decline to receive medical services by telemedicine and may withdraw from such care at any time.    Notes: Patient is a 58 yo paraplegic male. His wife is present who gives 95% of the history. He has an indwelling mike catheter. Reports increasing pelvic pain and purulent material in the urine        Dysuria   This is a recurrent problem. The current episode started yesterday. The problem occurs every urination. The problem has been gradually worsening. The quality of the pain is described as aching, burning and shooting. The pain is at a severity of 7/10. The pain is moderate. The maximum temperature recorded prior to his arrival was 100 - 100.9 F. The fever has been present for Less than 1 day. He is Not sexually active. There is No history of pyelonephritis. Associated symptoms include behavior changes, a discharge, flank pain, sweats and urgency. Pertinent negatives include no chills, frequency, hematuria,  hesitancy, nausea, possible pregnancy, vomiting, weight loss, constipation, rash or withholding. He has tried home medications and increased fluids for the symptoms. The treatment provided mild relief. His past medical history is significant for catheterization, recurrent UTIs, urinary stasis and a urological procedure. There is no history of diabetes insipidus, diabetes mellitus, genitourinary reflux, hypertension, kidney stones, a single kidney or STD.     Past Medical History:   Diagnosis Date    Acute colitis 4/12/2017    Anxiety 8/3/2012    Debility     Depression 8/3/2012    Difficulty walking     E. 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   Gastrointestinal:  Negative for constipation, nausea and vomiting.   Genitourinary:  Positive for dysuria, flank pain and urgency. Negative for frequency, hematuria and hesitancy.   Integumentary:  Negative for rash.          Objective     Physical Exam  Constitutional:       General: He is not in acute distress.     Appearance: He is ill-appearing. He is not toxic-appearing or diaphoretic.   Pulmonary:      Effort: Pulmonary effort is normal.   Psychiatric:         Mood and Affect: Mood is anxious.            Assessment and Plan     1. Upper respiratory tract infection, unspecified type    2. Urinary tract infection associated with indwelling urethral catheter, initial encounter  -     ciprofloxacin HCl (CIPRO) 500 MG tablet; Take 1 tablet (500 mg total) by mouth 2 (two) times daily. for 10 days   Dispense: 20 tablet; Refill: 0  -     Urine culture; Future; Expected date: 07/23/2024  -     Urinalysis; Future; Expected date: 07/23/2024  -     Comprehensive Metabolic Panel; Future; Expected date: 07/23/2024  -     CBC Auto Differential; Future; Expected date: 07/23/2024  -     Urine culture; Future; Expected date: 07/23/2024    3. Anxiety  -     ALPRAZolam (XANAX) 0.5 MG tablet; Take 1 tablet (0.5 mg total) by mouth 2 (two) times daily as needed for Anxiety.  Dispense: 30 tablet; Refill: 0    Other orders  -     phenazopyridine (PYRIDIUM) 100 MG tablet; Take 1 tablet (100 mg total) by mouth 3 (three) times daily as needed for Pain.  Dispense: 30 tablet; Refill: 0        Advised: Continue with antibiotics until gone. Increase water intake. May take tylenol as needed for fever. If your urine culture shows antibiotic resistance, we will notify you. Go to the Emergency Room if your symptoms become much worse. Otherwise, follow up with you PCP as scheduled.           No follow-ups on file.

## 2024-07-26 ENCOUNTER — DOCUMENT SCAN (OUTPATIENT)
Dept: HOME HEALTH SERVICES | Facility: HOSPITAL | Age: 59
End: 2024-07-26
Payer: MEDICARE

## 2024-07-26 PROBLEM — T83.511A URINARY TRACT INFECTION ASSOCIATED WITH INDWELLING URETHRAL CATHETER: Status: ACTIVE | Noted: 2017-04-12

## 2024-07-26 PROBLEM — N17.9 AKI (ACUTE KIDNEY INJURY): Status: ACTIVE | Noted: 2024-07-26

## 2024-07-26 PROBLEM — R65.21 SEPTIC SHOCK: Status: ACTIVE | Noted: 2017-04-12

## 2024-07-28 PROBLEM — T83.510A URINARY TRACT INFECTION ASSOCIATED WITH CYSTOSTOMY CATHETER: Status: ACTIVE | Noted: 2017-04-12

## 2024-07-31 ENCOUNTER — DOCUMENT SCAN (OUTPATIENT)
Dept: HOME HEALTH SERVICES | Facility: HOSPITAL | Age: 59
End: 2024-07-31
Payer: MEDICARE

## 2024-08-02 ENCOUNTER — TELEPHONE (OUTPATIENT)
Dept: INFECTIOUS DISEASES | Facility: CLINIC | Age: 59
End: 2024-08-02
Payer: MEDICARE

## 2024-08-02 ENCOUNTER — DOCUMENT SCAN (OUTPATIENT)
Dept: HOME HEALTH SERVICES | Facility: HOSPITAL | Age: 59
End: 2024-08-02
Payer: MEDICARE

## 2024-08-02 NOTE — TELEPHONE ENCOUNTER
----- Message from Cate Macedo sent at 8/2/2024 11:51 AM CDT -----  Contact: [facility]  Type:  Needs Medical Advice    Who Called: [facility]    Symptoms (please be specific): midline IV is clotted, no flow for his IV antibiotics      How long has patient had these symptoms:   found this morning    Would the patient rather a call back or a response via [facility]? Call back    Best Call Back Number:     Additional Information:  please call to advise  Thanks

## 2024-08-02 NOTE — TELEPHONE ENCOUNTER
He got 2 doses of the 10 day regimen.   His line is clotted today.      nurse is calling for directive.

## 2024-08-08 ENCOUNTER — TELEPHONE (OUTPATIENT)
Dept: FAMILY MEDICINE | Facility: CLINIC | Age: 59
End: 2024-08-08

## 2024-08-09 ENCOUNTER — CARE AT HOME (OUTPATIENT)
Dept: HOME HEALTH SERVICES | Facility: CLINIC | Age: 59
End: 2024-08-09
Payer: MEDICARE

## 2024-08-09 VITALS
SYSTOLIC BLOOD PRESSURE: 112 MMHG | DIASTOLIC BLOOD PRESSURE: 70 MMHG | RESPIRATION RATE: 20 BRPM | OXYGEN SATURATION: 98 % | HEART RATE: 76 BPM | TEMPERATURE: 98 F

## 2024-08-09 DIAGNOSIS — L89.154 SACRAL DECUBITUS ULCER, STAGE IV: ICD-10-CM

## 2024-08-09 DIAGNOSIS — Z93.59 SUPRAPUBIC CATHETER: Primary | ICD-10-CM

## 2024-08-09 DIAGNOSIS — G81.94 LEFT HEMIPARESIS: ICD-10-CM

## 2024-08-09 DIAGNOSIS — R53.81 DEBILITY: ICD-10-CM

## 2024-08-09 DIAGNOSIS — R33.9 URINARY RETENTION: ICD-10-CM

## 2024-08-09 PROCEDURE — 1111F DSCHRG MED/CURRENT MED MERGE: CPT | Mod: CPTII,,, | Performed by: NURSE PRACTITIONER

## 2024-08-09 PROCEDURE — 99348 HOME/RES VST EST LOW MDM 30: CPT | Mod: ,,, | Performed by: NURSE PRACTITIONER

## 2024-08-09 PROCEDURE — 3078F DIAST BP <80 MM HG: CPT | Mod: CPTII,,, | Performed by: NURSE PRACTITIONER

## 2024-08-09 PROCEDURE — 1159F MED LIST DOCD IN RCRD: CPT | Mod: CPTII,,, | Performed by: NURSE PRACTITIONER

## 2024-08-09 PROCEDURE — 1160F RVW MEDS BY RX/DR IN RCRD: CPT | Mod: CPTII,,, | Performed by: NURSE PRACTITIONER

## 2024-08-09 PROCEDURE — 3074F SYST BP LT 130 MM HG: CPT | Mod: CPTII,,, | Performed by: NURSE PRACTITIONER

## 2024-08-09 NOTE — PROGRESS NOTES
[facility]  Medical Chronic Care Home Visit    Encounter Provider: Jailene Oviedo   PCP: Tanner Seals MD  Consult Requested By: No ref. provider found    HISTORY OF PRESENT ILLNESS      Patient ID: Nadeem Alegre is a 59 y.o. male is being seen in the home due to physical debility that presents a taxing effort to leave the home, to mitigate high risk of hospital readmission and/or due to the limited availability of reliable or safe options for transportation to the point of access to the provider. Prior to treatment on this visit the chart was reviewed and patient verbal consent was obtained.    Chronic medical conditions synopsis:  Nadeem Alegre is a 59 y.o.male with medical/surgical history including encephalitis, chronic disability, nonambulatory, anxiety, anemia, hypertension, hyperlipidemia, Hemiplegia and hemiparesis following cerebral infarction affecting left non-dominant side, chronic pressure ulcer of sacral region, stage 4, Atherosclerotic heart disease of native coronary artery with other forms of angina pectoris, mixed conductive and sensorineural hearing loss, neuromuscular dysfunction of bladder-chronic indwelling catheter via supra pubic site, neurogenic bowel,personal history of urinary (tract)infections, pseudobulbar affect, insomnia, obstructive sleep apnea.     Admit: 7/26/24  Discharged: 7/29/24  Hospital Course:   He was admitted for eval and treatment of septic shock 2/2 UTI with hypotension, TITA, likely IVVD. He was placed on empiric broad-spectrum antibiotics and given IVFs. Renal function improving. Urine culture from 7/24 with >100K GNR and > 100K Enterococcus. He also has chronic suprapubic catheter. ID consulted. Culture with ESBL EColi and Enterococcus. Abx changed to Merrem + Vanc.  Recommend SP catheter exchange-- urology consulted and exchanged at bedside on 7/27.  Patient clinically improved and is stable for discharge home. Plan for ertapenem 1g daily and  "amoxicillin PO TID to complete 10-14 day course per ID recs.       ER visit 8/2/24 PICC line not patent. Declotted in ER.  PICC line discontinued 8/8/24 per  RN. Also has just completed oral amoxicillin.    With this [facility] NP visit patient is found lying in a hospital bed, awake, alert but minimally interactive. Spouse, Mely, is present and states she is primary caregiver of patient as he is maximally dependent on all ADLs following an ear infection that "infected his brain" when he was 51 yrs old. He is bed bound but can get into a speciality chair with maximum assistance. He has a chronic indwelling mike via suprapubic cath. Wife states patient completed oral antibiotics and has s/s of infection. When he does get a UTI, Mely states patient becomes combative.     Patient is followed by [facility] RN for mike care and wound care to chronic coccyx pressure injury that "does not heal"-has been open for at least last 8 years per wife. Currently, chronic sacral stage IV, followed by Wound  NP weekly and [facility] nursing wound care twice weekly-santyl, sliver alginate current plan of care.     No distress noted. [facility] NP will follow patient every 8-12 weeks and prn due to difficulty leaving home. Program contact information provided to patient and left in home.       DECISION MAKING TODAY       Assessment & Plan:  1. Suprapubic catheter  Overview:  Suprapubic     Assessment & Plan:  Chronic, related to CVA, neurogenic bladder.  Has chronic indwelling catheter via supra pubic site.  Followed by  for mike care and Urology.  Hx of UTIs-recent hospitalization for UTI,sepsis. Completed IV and oral antibiotics.  Monitor.  Educated on s/s UTI, urine appearance and when to report, adequate oral hydration.       2. Urinary retention  Assessment & Plan:  Chronic, related to CVA.  Has chronic indwelling cath via supra pubic site.  Followed by  for mike care.  Hx " of UTIs-recent hospitalization for UTI sepsis. Completed IV and oral antibiotics.  Followed by Urology.       3. Sacral decubitus ulcer, stage IV  Assessment & Plan:  Chronic, worse since recent hospitalization.  Followed by Wound  NP weekly in home and Home Health RN twice weekly. Wife provides care on other day. Currently using santyl/silver alginate.  Educated on importance of offloading, s/s wound deterioration.  Monitor.       4. Debility  Assessment & Plan:  Chronic, ADL dependent, due to CVA following ear infection, s/p mastoidectomy, CVA per wife at age 49.   Wife provides care.  Maximal DME use daily.         5. Left hemiparesis  Assessment & Plan:  Chronic, S/p CVA.  ADL dependent on wife.  Has chronic sacral/coccyx area pressure ulcer for years.  Continue home flexeril, robaxin.  Followed by PCP.             ENVIRONMENT OF CARE      Family and/or Caregiver present at visit?  Yes  Name of Caregiver: Mely, spouse  History provided by: caregiver    4Ms for Medical Decision-Making in Older Adults    Last Completed EAWV: None    MOBILITY:  Get Up and Go:       No data to display              Activities of Daily Living:       No data to display              Whisper Test:       No data to display              Disability Status:      4/13/2017     1:17 AM   Disability Status   Are you deaf or do you have serious difficulty hearing? N   Are you blind or do you have serious difficulty seeing, even when wearing glasses? N   Because of a physical, mental, or emotional condition, do you have serious difficulty concentrating, remembering, or making decisions? N   Do you have serious difficulty walking or climbing stairs? Y   Do you have difficulty dressing or bathing? Y   Because of a physical, mental, or emotional condition, do you have difficulty doing errands alone such as visiting a doctor's office or shopping? Y     Nutrition Screening:       No data to display             Screening Score:  0-7 Malnourished, 8-11 At Risk, 12-14 Normal        MENTATION:   Depression Patient Health Questionnaire:      3/16/2023     1:57 PM   Depression Patient Health Questionnaire   Over the last two weeks how often have you been bothered by little interest or pleasure in doing things Not at all   Over the last two weeks how often have you been bothered by feeling down, depressed or hopeless Not at all   PHQ-2 Total Score 0     Has Dementia Dx: No    Cognitive Function Screening:       No data to display              Cognitive Function Screening Total - Less than 4 = Abnormal,  Greater than or equal to 4 = Normal        MEDICATIONS:  High Risk Medications:  Total Active Medications: 2  ALPRAZolam - 0.5 MG  cyclobenzaprine - 10 MG    WHAT MATTERS MOST:  Advance Care Planning   ACP Status:   Patient has had an ACP conversation  Living Will: No  Power of : No  [place]: No    What is most important right now is to focus on spending time at homeavoiding the hospitalremaining as independent as possiblesymptom/pain controlcomfort and QOL     Accordingly, we have decided that the best plan to meet the patient's goals includes continuing with treatment      What matters most to patient today is: patient barely spoke today           Is patient hospice appropriate: No  (If needed, use PPS <30 or FAST score >7)  Was referral to hospice placed: No    Impression upon entering the home:  Physical Dwelling: single family home   Appearance of home environment: cleaniness: dirty, walking pathways: clear, lighting: adequate, and home structure: sound structure  Functional Status: max assistance  Mobility: bedbound  Nutritional access: adequate intake and access  Home Health: Yes,  [facility]     DME/Supplies: hospital bed, wheelchair, and wound care supplies     Diagnostic tests reviewed/disposition: No diagnosic tests pending after this hospitalization.  Disease/illness education:  recent hospitalization for UTI, sepsis,  chronic sacral pressure ulcer  Establishment or re-establishment of referral orders for community resources: No other necessary community resources.   Discussion with other health care providers: No discussion with other health care providers necessary.   Does patient have a PCP at [facility]? Yes   Repatriation plan with PCP? [facility] reason: mobility   Does patient have an ostomy (ileostomy, colostomy, suprapubic catheter, nephrostomy tube, tracheostomy, PEG tube, pleurex catheter, cholecystostomy, etc)? No  Were BPAs reviewed? Yes    Social History     Socioeconomic History    Marital status:    Occupational History     Employer: [employer]   Tobacco Use    Smoking status: Former     Current packs/day: 0.00     Average packs/day: 0.5 packs/day for 18.0 years (9.0 ttl pk-yrs)     Types: Cigarettes     Start date: 10/28/1996     Quit date: 10/28/2014     Years since quittin.7    Smokeless tobacco: Never   Substance and Sexual Activity    Alcohol use: Yes     Alcohol/week: 5.0 standard drinks of alcohol     Types: 6 Standard drinks or equivalent per week     Comment: occ.    Drug use: Never     Social Determinants of Health     Financial Resource Strain: Low Risk  (2024)    Overall Financial Resource Strain (CARDIA)     Difficulty of Paying Living Expenses: Not very hard   Food Insecurity: No Food Insecurity (2024)    Hunger Vital Sign     Worried About Running Out of Food in the Last Year: Never true     Ran Out of Food in the Last Year: Never true   Transportation Needs: No Transportation Needs (2024)    TRANSPORTATION NEEDS     Transportation : No   Physical Activity: Inactive (2023)    Exercise Vital Sign     Days of Exercise per Week: 0 days     Minutes of Exercise per Session: 0 min   Stress: Stress Concern Present (2023)    [employer] - Occupational Stress Questionnaire     Feeling of Stress : To some extent   Housing Stability: Low Risk   (7/26/2024)    Housing Stability Vital Sign     Unable to Pay for Housing in the Last Year: No     Homeless in the Last Year: No         OBJECTIVE:     Vital Signs:  Vitals:    08/09/24 1130   BP: 112/70   Pulse: 76   Resp: 20   Temp: 98.2 °F (36.8 °C)       Review of Systems   Constitutional:  Positive for activity change, appetite change and fatigue. Negative for fever and unexpected weight change.   HENT: Negative.     Eyes: Negative.    Respiratory: Negative.     Cardiovascular: Negative.    Gastrointestinal: Negative.    Endocrine: Negative.    Genitourinary:         Has suprapubic mike cath-chronic   Musculoskeletal:  Positive for back pain.         Physical Exam:  Physical Exam  Constitutional:       General: He is not in acute distress.     Appearance: He is obese. He is not ill-appearing.      Comments: Minimally interactive   HENT:      Head: Normocephalic and atraumatic.      Right Ear: External ear normal.      Left Ear: External ear normal.      Nose: Nose normal.      Mouth/Throat:      Mouth: Mucous membranes are dry.      Pharynx: Oropharynx is clear.   Eyes:      Extraocular Movements: Extraocular movements intact.      Conjunctiva/sclera: Conjunctivae normal.      Pupils: Pupils are equal, round, and reactive to light.   Cardiovascular:      Rate and Rhythm: Normal rate and regular rhythm.      Pulses: Normal pulses.      Heart sounds: Normal heart sounds.   Pulmonary:      Effort: Pulmonary effort is normal.      Breath sounds: Normal breath sounds.   Abdominal:      General: Bowel sounds are normal.      Palpations: Abdomen is soft.   Genitourinary:     Comments: 16 Fr mike in place via suprapubic site, WNL. Urine appears clear, yellow.  Musculoskeletal:      Cervical back: Neck supple.      Comments: Generalized atrophy   Skin:     General: Skin is warm and dry.      Capillary Refill: Capillary refill takes 2 to 3 seconds.      Comments: Chronic coccyx pressure injury (present for years). No  s/s infection but non-healing.   Neurological:      Mental Status: He is alert.      Motor: Weakness present.      Comments: Oriented to person and place, forgetful  Bed bound mostly gets into specialty chair    Psychiatric:      Comments: Flat affect, minimally interactive              INSTRUCTIONS FOR PATIENT:     Scheduled Follow-up, Appts Reviewed with Modifications if Needed: Yes  Future Appointments   Date Time Provider Department Center   8/15/2024  1:45 PM Gill Schaefer PA-C [facility]   9/4/2024  4:00 PM Tanner Seals MD [facility]         Current Outpatient Medications:     ALPRAZolam (XANAX) 0.5 MG tablet, Take 1 tablet (0.5 mg total) by mouth 2 (two) times daily as needed for Anxiety., Disp: 30 tablet, Rfl: 0    ascorbic Acid (VITAMIN C) 500 mg CpSR, Vitamin C 500 mg capsule,extended release  Take 1 capsule twice a day by oral route., Disp: , Rfl:     aspirin (ECOTRIN) 81 MG EC tablet, Take 1 tablet by mouth once daily., Disp: , Rfl:     bisacodyL (DULCOLAX) 10 mg Supp, Place rectally., Disp: , Rfl:     CRANBERRY FRUIT EXTRACT (CRANBERRY CONCENTRATE ORAL), Take 1 oz by mouth every evening., Disp: , Rfl:     cyclobenzaprine (FLEXERIL) 10 MG tablet, Take 10 mg by mouth 3 (three) times daily as needed for Muscle spasms., Disp: , Rfl:     docusate sodium (COLACE) 100 MG capsule, Take 200 mg by mouth 2 (two) times daily. , Disp: , Rfl:     FENOFIBRATE MICRONIZED ORAL, Take 48 mg by mouth once daily., Disp: , Rfl:     fish oil-omega-3 fatty acids 300-1,000 mg capsule, Take 2 g by mouth 2 (two) times daily. , Disp: , Rfl:     ibuprofen (ADVIL,MOTRIN) 800 MG tablet, Take 1 tablet (800 mg total) by mouth every 8 (eight) hours as needed for Pain., Disp: 90 tablet, Rfl: 5    Lactobacillus rhamnosus GG (CULTURELLE) 10 billion cell capsule, Take 1 capsule by mouth once daily., Disp: 14 capsule, Rfl: 0    magnesium oxide (MAG-OX) 400 mg tablet, Take 400 mg by mouth 2 (two) times daily. ,  Disp: , Rfl:     melatonin 5 mg Tab, Take by mouth nightly as needed. , Disp: , Rfl:     modafiniL (PROVIGIL) 200 MG Tab, Take 1 tablet by mouth once daily., Disp: , Rfl:     multivitamin (THERAGRAN) per tablet, Take 1 tablet by mouth once daily., Disp: , Rfl:     mupirocin (BACTROBAN) 2 % ointment, SMARTSI Application Topical 2-3 Times Daily, Disp: , Rfl:     omeprazole (PRILOSEC) 20 MG capsule, Take 20 mg by mouth once daily., Disp: , Rfl:     ondansetron (ZOFRAN-ODT) 8 MG TbDL, Take 1 tablet (8 mg total) by mouth every 8 (eight) hours as needed., Disp: 10 tablet, Rfl: 2    pantoprazole (PROTONIX) 40 MG tablet, Take 1 tablet by mouth once daily., Disp: , Rfl:     polyethylene glycol (GLYCOLAX) 17 gram PwPk, Take 17 g by mouth 2 (two) times daily., Disp: 100 each, Rfl: 6    potassium chloride SA (K-DUR,KLOR-CON M) 10 MEQ tablet, Take by mouth 2 (two) times daily., Disp: , Rfl:     senna (SENOKOT) 8.6 mg tablet, Take 2 tablets by mouth 2 (two) times daily. , Disp: , Rfl:     sucralfate (CARAFATE) 1 gram tablet, Take 1 g by mouth 4 (four) times daily., Disp: , Rfl:     vitamin E 100 UNIT capsule, Take 100 Units by mouth once daily., Disp: , Rfl:     zinc gluconate 50 mg tablet, Take 50 mg by mouth once daily., Disp: , Rfl:     albuterol (VENTOLIN HFA) 90 mcg/actuation inhaler, Inhale 2 puffs into the lungs every 4 (four) hours as needed for Wheezing or Shortness of Breath. Rescue, Disp: 18 g, Rfl: 3    cetirizine (ZYRTEC) 1 mg/mL syrup, Take 10 mLs (10 mg total) by mouth once daily., Disp: 473 mL, Rfl: 0    Medication Reconciliation:  Were medications changed during this appointment? No  Were medications in the home? Yes  Is the patient taking the medications as directed? Yes  Does the patient/caregiver understand the medications and changes? Yes  Does updated med list accurately reflects meds patient is currently taking? Yes    I spent a total of 22 minutes on the day of the visit.This includes face to face time  and non-face to face time preparing to see the patient (eg, review of tests), obtaining and/or reviewing separately obtained history, documenting clinical information in the electronic or other health record, independently interpreting results and communicating results to the patient/family/caregiver, or care coordinator.        Signature: Jailene Oviedo NP

## 2024-08-12 NOTE — ASSESSMENT & PLAN NOTE
Chronic, related to CVA, neurogenic bladder.  Has chronic indwelling catheter via supra pubic site.  Followed by HH for mike care and Urology.  Hx of UTIs-recent hospitalization for UTI,sepsis. Completed IV and oral antibiotics.  Monitor.  Educated on s/s UTI, urine appearance and when to report, adequate oral hydration.

## 2024-08-12 NOTE — ASSESSMENT & PLAN NOTE
Chronic, worse since recent hospitalization.  Followed by Wound  NP weekly in home and Home Health RN twice weekly. Wife provides care on other day. Currently using santyl/silver alginate.  Educated on importance of offloading, s/s wound deterioration.  Monitor.

## 2024-08-12 NOTE — ASSESSMENT & PLAN NOTE
Chronic, related to CVA.  Has chronic indwelling cath via supra pubic site.  Followed by HH for mike care.  Hx of UTIs-recent hospitalization for UTI sepsis. Completed IV and oral antibiotics.  Followed by Urology.

## 2024-08-13 PROCEDURE — G0179 MD RECERTIFICATION HHA PT: HCPCS | Mod: ,,, | Performed by: INTERNAL MEDICINE

## 2024-08-15 ENCOUNTER — OFFICE VISIT (OUTPATIENT)
Dept: FAMILY MEDICINE | Facility: CLINIC | Age: 59
End: 2024-08-15
Payer: MEDICARE

## 2024-08-15 DIAGNOSIS — Z93.59 SUPRAPUBIC CATHETER: ICD-10-CM

## 2024-08-15 DIAGNOSIS — T83.511D URINARY TRACT INFECTION ASSOCIATED WITH INDWELLING URETHRAL CATHETER, SUBSEQUENT ENCOUNTER: ICD-10-CM

## 2024-08-15 DIAGNOSIS — N39.0 URINARY TRACT INFECTION ASSOCIATED WITH INDWELLING URETHRAL CATHETER, SUBSEQUENT ENCOUNTER: ICD-10-CM

## 2024-08-15 DIAGNOSIS — Z74.01 BEDBOUND: ICD-10-CM

## 2024-08-15 DIAGNOSIS — I67.9 CEREBRAL VASCULAR DISEASE: ICD-10-CM

## 2024-08-15 DIAGNOSIS — R45.4 IRRITABILITY: Primary | ICD-10-CM

## 2024-08-15 DIAGNOSIS — G93.40 ENCEPHALOPATHY: ICD-10-CM

## 2024-08-15 PROCEDURE — 1111F DSCHRG MED/CURRENT MED MERGE: CPT | Mod: CPTII,95,, | Performed by: PHYSICIAN ASSISTANT

## 2024-08-15 PROCEDURE — 99214 OFFICE O/P EST MOD 30 MIN: CPT | Mod: 95,,, | Performed by: PHYSICIAN ASSISTANT

## 2024-08-15 NOTE — PROGRESS NOTES
"  SUBJECTIVE:    Patient ID: Nadeem Alegre is a 59 y.o. male.    Chief Complaint: hospital follow up    HPI      Pt is known to me, PCP Dr. Seals.     Pt is a 59 year old male with chronic encephalopathy, hx of cerebral infarction with residual left hemiparesis, CAD, chronic urinary retention with suprapubic catheter placement, obesity, GERD, ALIYA with CPAP. He presents today for hospital follow up. Completed IV abx and oral abx last week. Wife states he is still a bit irritable and confused. Catheter was changed yesterday and looked normal. No penile discharge. No fever, chills.       Admit Date: 7/25  Discharge Date: 7/29  Went back to ER on 8/2 for clogged PICC line. Resolved and discharged same day.  Discharge Facility: [facility]      Hospital course:  "He was admitted for eval and treatment of septic shock 2/2 UTI with hypotension, TITA. He was placed on empiric broad-spectrum antibiotics and given IVFs. Renal function improving. Urine culture from 7/24 with >100K GNR and > 100K Enterococcus. He also has chronic suprapubic catheter. ID consulted. Culture with ESBL EColi and Enterococcus. Abx changed to Merrem + Vanc.  Recommend SP catheter exchange-- urology consulted and exchanged at bedside on 7/27.  Patient clinically improved and is stable for discharge home. Plan for ertapenem 1g daily and amoxicillin PO TID to complete 10-14 day course per ID recs.  "       Family and/or Caretaker present at visit? Yes  Medication Reconciliation:  Medications changed/added/deleted.   New Prescriptions filled after discharge: yes  Discharge summary reviewed:  yes  Diagnostic tests reviewed/disposition: I have reviewed all completed as well as pending diagnostic tests at the time of discharge  Disease/illness education: yes  Follow up appointments scheduled:  yes                Follow up labs/tests ordered:   yes  Home Health ordered on discharge: yes  Home Health company name: [employer], also has [facility] " home  Establishment or re-establishment of referral orders for community resources: No other necessary community resources  DME ordered at discharge:   no    Discussion with other health care providers: No discussion with other health care providers necessary  Patient follow up phone call documented on separate encounter.    Lab Visit on 08/02/2024   Component Date Value Ref Range Status    WBC 08/02/2024 7.17  3.90 - 12.70 K/uL Final    RBC 08/02/2024 4.20 (L)  4.60 - 6.20 M/uL Final    Hemoglobin 08/02/2024 11.2 (L)  14.0 - 18.0 g/dL Final    Hematocrit 08/02/2024 35.7 (L)  40.0 - 54.0 % Final    MCV 08/02/2024 85  82 - 98 fL Final    MCH 08/02/2024 26.7 (L)  27.0 - 31.0 pg Final    MCHC 08/02/2024 31.4 (L)  32.0 - 36.0 g/dL Final    RDW 08/02/2024 14.9 (H)  11.5 - 14.5 % Final    Platelets 08/02/2024 449  150 - 450 K/uL Final    MPV 08/02/2024 9.7  9.2 - 12.9 fL Final    Immature Granulocytes 08/02/2024 0.6 (H)  0.0 - 0.5 % Final    Gran # (ANC) 08/02/2024 4.4  1.8 - 7.7 K/uL Final    Immature Grans (Abs) 08/02/2024 0.04  0.00 - 0.04 K/uL Final    Lymph # 08/02/2024 1.9  1.0 - 4.8 K/uL Final    Mono # 08/02/2024 0.5  0.3 - 1.0 K/uL Final    Eos # 08/02/2024 0.2  0.0 - 0.5 K/uL Final    Baso # 08/02/2024 0.06  0.00 - 0.20 K/uL Final    nRBC 08/02/2024 0  0 /100 WBC Final    Gran % 08/02/2024 61.2  38.0 - 73.0 % Final    Lymph % 08/02/2024 26.9  18.0 - 48.0 % Final    Mono % 08/02/2024 7.4  4.0 - 15.0 % Final    Eosinophil % 08/02/2024 3.1  0.0 - 8.0 % Final    Basophil % 08/02/2024 0.8  0.0 - 1.9 % Final    Differential Method 08/02/2024 Automated   Final    Sodium 08/02/2024 139  136 - 145 mmol/L Final    Potassium 08/02/2024 4.2  3.5 - 5.1 mmol/L Final    Chloride 08/02/2024 108  95 - 110 mmol/L Final    CO2 08/02/2024 23  22 - 31 mmol/L Final    Glucose 08/02/2024 103  70 - 110 mg/dL Final    BUN 08/02/2024 9  9 - 21 mg/dL Final    Creatinine 08/02/2024 0.74  0.50 - 1.40 mg/dL Final    Calcium 08/02/2024 8.2  (L)  8.4 - 10.2 mg/dL Final    Total Protein 08/02/2024 6.6  6.0 - 8.4 g/dL Final    Albumin 08/02/2024 3.3 (L)  3.5 - 5.2 g/dL Final    Total Bilirubin 08/02/2024 0.4  0.2 - 1.3 mg/dL Final    Alkaline Phosphatase 08/02/2024 79  38 - 145 U/L Final    AST 08/02/2024 38  17 - 59 U/L Final    ALT 08/02/2024 35  0 - 50 U/L Final    Anion Gap 08/02/2024 8  5 - 12 mmol/L Final    eGFR 08/02/2024 >60  >60 mL/min/1.73 m^2 Final   No results displayed because visit has over 200 results.      Lab Visit on 07/24/2024   Component Date Value Ref Range Status    WBC 07/24/2024 18.25 (H)  3.90 - 12.70 K/uL Final    RBC 07/24/2024 4.49 (L)  4.60 - 6.20 M/uL Final    Hemoglobin 07/24/2024 12.5 (L)  14.0 - 18.0 g/dL Final    Hematocrit 07/24/2024 39.3 (L)  40.0 - 54.0 % Final    MCV 07/24/2024 88  82 - 98 fL Final    MCH 07/24/2024 27.8  27.0 - 31.0 pg Final    MCHC 07/24/2024 31.8 (L)  32.0 - 36.0 g/dL Final    RDW 07/24/2024 15.5 (H)  11.5 - 14.5 % Final    Platelets 07/24/2024 227  150 - 450 K/uL Final    MPV 07/24/2024 11.0  9.2 - 12.9 fL Final    Immature Granulocytes 07/24/2024 0.8 (H)  0.0 - 0.5 % Final    Gran # (ANC) 07/24/2024 14.6 (H)  1.8 - 7.7 K/uL Final    Immature Grans (Abs) 07/24/2024 0.14 (H)  0.00 - 0.04 K/uL Final    Lymph # 07/24/2024 1.4  1.0 - 4.8 K/uL Final    Mono # 07/24/2024 2.0 (H)  0.3 - 1.0 K/uL Final    Eos # 07/24/2024 0.1  0.0 - 0.5 K/uL Final    Baso # 07/24/2024 0.06  0.00 - 0.20 K/uL Final    nRBC 07/24/2024 0  0 /100 WBC Final    Gran % 07/24/2024 79.9 (H)  38.0 - 73.0 % Final    Lymph % 07/24/2024 7.5 (L)  18.0 - 48.0 % Final    Mono % 07/24/2024 10.8  4.0 - 15.0 % Final    Eosinophil % 07/24/2024 0.7  0.0 - 8.0 % Final    Basophil % 07/24/2024 0.3  0.0 - 1.9 % Final    Differential Method 07/24/2024 Automated   Final    Sodium 07/24/2024 132 (L)  136 - 145 mmol/L Final    Potassium 07/24/2024 3.7  3.5 - 5.1 mmol/L Final    Chloride 07/24/2024 99  95 - 110 mmol/L Final    CO2 07/24/2024 19 (L)   22 - 31 mmol/L Final    Glucose 07/24/2024 118 (H)  70 - 110 mg/dL Final    BUN 07/24/2024 22 (H)  9 - 21 mg/dL Final    Creatinine 07/24/2024 2.29 (H)  0.50 - 1.40 mg/dL Final    Calcium 07/24/2024 8.5  8.4 - 10.2 mg/dL Final    Total Protein 07/24/2024 6.3  6.0 - 8.4 g/dL Final    Albumin 07/24/2024 3.4 (L)  3.5 - 5.2 g/dL Final    Total Bilirubin 07/24/2024 1.1  0.2 - 1.3 mg/dL Final    Alkaline Phosphatase 07/24/2024 79  38 - 145 U/L Final    AST 07/24/2024 21  17 - 59 U/L Final    ALT 07/24/2024 14  0 - 50 U/L Final    Anion Gap 07/24/2024 14 (H)  5 - 12 mmol/L Final    eGFR 07/24/2024 32 (A)  >60 mL/min/1.73 m^2 Final    Urine Culture, Routine 07/24/2024  (A)   Final                    Value:ESCHERICHIA COLI ESBL  > 100,000 cfu/ml      Urine Culture, Routine 07/24/2024  (A)   Final                    Value:ENTEROCOCCUS FAECALIS  > 100,000 cfu/ml      RBC, UA 07/24/2024 SEE COMMENT  0 - 4 /hpf Final    WBC, UA 07/24/2024 SEE COMMENT  0 - 5 /hpf Final    WBC Clumps, UA 07/24/2024 SEE COMMENT  None-Rare Final    Bacteria 07/24/2024 SEE COMMENT  Negative /hpf Final    Squam Epithel, UA 07/24/2024 SEE COMMENT  /hpf Final    Hyaline Casts, UA 07/24/2024 SEE COMMENT (A)  0 - 1 /lpf Final    Microscopic Comment 07/24/2024 SEE COMMENT   Final       Past Medical History:   Diagnosis Date    Acute colitis 04/12/2017    Anxiety 08/03/2012    Debility     Depression 08/03/2012    Difficulty walking     E. 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    Sacral decubitus ulcer     Severe sepsis(995.92) 04/12/2017    Sleep apnea     supposed to use bipap, but does not like it    Small bowel obstruction 04/12/2017    Stroke     Urinary tract infection without hematuria 04/12/2017     Past Surgical History:   Procedure  Laterality Date    CORONARY ANGIOGRAPHY N/A 3/23/2020    Procedure: ANGIOGRAM, CORONARY ARTERY;  Surgeon: Domingo Kasper MD;  Location: [facility] CATH;  Service: Cardiovascular;  Laterality: N/A;    GASTROSTOMY TUBE PLACEMENT      INSERTION OF INTRAMEDULLARY NAIL INTO TIBIA Right 3/20/2020    Procedure: INSERTION, INTRAMEDULLARY TONI, TIBIA;  Surgeon: Miguel Sadler MD;  Location: [facility] OR;  Service: Orthopedics;  Laterality: Right;    LEFT HEART CATHETERIZATION Left 3/23/2020    Procedure: Left heart cath;  Surgeon: Domingo Kasper MD;  Location: [facility] CATH;  Service: Cardiovascular;  Laterality: Left;    MASTOID SURGERY      RETROGRADE INTRAMEDULLARY RODDING OF DISTAL FEMUR Left 3/20/2020    Procedure: INSERTION, INTRAMEDULLARY TONI, FEMUR, DISTAL, RETROGRADE;  Surgeon: Miguel Sadler MD;  Location: [facility] OR;  Service: Orthopedics;  Laterality: Left;    SUPRAPUBIC TUBE PLACEMENT N/A 02/2017    TRACHEOSTOMY CLOSURE       Family History   Problem Relation Name Age of Onset    Heart disease Father  64        CABG    Colon cancer Father  66        colorectal    Alzheimer's disease Mother  50        Early onset dementia    Heart disease Mother         Marital Status:   Alcohol History:  reports current alcohol use of about 5.0 standard drinks of alcohol per week.  Tobacco History:  reports that he quit smoking about 9 years ago. His smoking use included cigarettes. He started smoking about 27 years ago. He has a 9 pack-year smoking history. He has never used smokeless tobacco.  Drug History:  reports no history of drug use.    Review of patient's allergies indicates:  No Known Allergies    Current Outpatient Medications:     albuterol (VENTOLIN HFA) 90 mcg/actuation inhaler, Inhale 2 puffs into the lungs every 4 (four) hours as needed for Wheezing or Shortness of Breath. Rescue, Disp: 18 g, Rfl: 3    ALPRAZolam (XANAX) 0.5 MG tablet, Take 1 tablet (0.5 mg total) by mouth 2 (two) times daily as needed for Anxiety.,  Disp: 30 tablet, Rfl: 0    ascorbic Acid (VITAMIN C) 500 mg CpSR, Vitamin C 500 mg capsule,extended release  Take 1 capsule twice a day by oral route., Disp: , Rfl:     aspirin (ECOTRIN) 81 MG EC tablet, Take 1 tablet by mouth once daily., Disp: , Rfl:     bisacodyL (DULCOLAX) 10 mg Supp, Place rectally., Disp: , Rfl:     cetirizine (ZYRTEC) 1 mg/mL syrup, Take 10 mLs (10 mg total) by mouth once daily., Disp: 473 mL, Rfl: 0    CRANBERRY FRUIT EXTRACT (CRANBERRY CONCENTRATE ORAL), Take 1 oz by mouth every evening., Disp: , Rfl:     cyclobenzaprine (FLEXERIL) 10 MG tablet, Take 10 mg by mouth 3 (three) times daily as needed for Muscle spasms., Disp: , Rfl:     docusate sodium (COLACE) 100 MG capsule, Take 200 mg by mouth 2 (two) times daily. , Disp: , Rfl:     FENOFIBRATE MICRONIZED ORAL, Take 48 mg by mouth once daily., Disp: , Rfl:     fish oil-omega-3 fatty acids 300-1,000 mg capsule, Take 2 g by mouth 2 (two) times daily. , Disp: , Rfl:     ibuprofen (ADVIL,MOTRIN) 800 MG tablet, Take 1 tablet (800 mg total) by mouth every 8 (eight) hours as needed for Pain., Disp: 90 tablet, Rfl: 5    Lactobacillus rhamnosus GG (CULTURELLE) 10 billion cell capsule, Take 1 capsule by mouth once daily., Disp: 14 capsule, Rfl: 0    magnesium oxide (MAG-OX) 400 mg tablet, Take 400 mg by mouth 2 (two) times daily. , Disp: , Rfl:     melatonin 5 mg Tab, Take by mouth nightly as needed. , Disp: , Rfl:     modafiniL (PROVIGIL) 200 MG Tab, Take 1 tablet by mouth once daily., Disp: , Rfl:     multivitamin (THERAGRAN) per tablet, Take 1 tablet by mouth once daily., Disp: , Rfl:     mupirocin (BACTROBAN) 2 % ointment, SMARTSI Application Topical 2-3 Times Daily, Disp: , Rfl:     omeprazole (PRILOSEC) 20 MG capsule, Take 20 mg by mouth once daily., Disp: , Rfl:     ondansetron (ZOFRAN-ODT) 8 MG TbDL, Take 1 tablet (8 mg total) by mouth every 8 (eight) hours as needed., Disp: 10 tablet, Rfl: 2    pantoprazole (PROTONIX) 40 MG tablet, Take 1  tablet by mouth once daily., Disp: , Rfl:     polyethylene glycol (GLYCOLAX) 17 gram PwPk, Take 17 g by mouth 2 (two) times daily., Disp: 100 each, Rfl: 6    potassium chloride SA (K-DUR,KLOR-CON M) 10 MEQ tablet, Take by mouth 2 (two) times daily., Disp: , Rfl:     senna (SENOKOT) 8.6 mg tablet, Take 2 tablets by mouth 2 (two) times daily. , Disp: , Rfl:     sucralfate (CARAFATE) 1 gram tablet, Take 1 g by mouth 4 (four) times daily., Disp: , Rfl:     vitamin E 100 UNIT capsule, Take 100 Units by mouth once daily., Disp: , Rfl:     zinc gluconate 50 mg tablet, Take 50 mg by mouth once daily., Disp: , Rfl:     Review of Systems   Constitutional:  Negative for activity change and unexpected weight change.   HENT:  Negative for hearing loss, rhinorrhea and trouble swallowing.    Eyes:  Negative for discharge and visual disturbance.   Respiratory:  Negative for chest tightness and wheezing.    Cardiovascular:  Negative for chest pain and palpitations.   Gastrointestinal:  Positive for constipation. Negative for blood in stool, diarrhea and vomiting.   Endocrine: Negative for polydipsia and polyuria.   Genitourinary:  Positive for difficulty urinating and urgency. Negative for hematuria.   Musculoskeletal:  Positive for arthralgias. Negative for joint swelling and neck pain.   Neurological:  Positive for weakness. Negative for headaches.   Psychiatric/Behavioral:  Negative for dysphoric mood.         Objective:      There were no vitals filed for this visit.  Physical Exam  Constitutional:       General: He is not in acute distress.     Appearance: Normal appearance. He is obese. He is ill-appearing (chronic, bed bound). He is not toxic-appearing or diaphoretic.   HENT:      Head: Normocephalic and atraumatic.   Pulmonary:      Effort: No respiratory distress.   Neurological:      Mental Status: He is alert. Mental status is at baseline.   Psychiatric:         Mood and Affect: Mood normal.         Behavior: Behavior  normal.         Assessment:       1. Irritability    2. Urinary tract infection associated with indwelling urethral catheter, subsequent encounter    3. Suprapubic catheter    4. Body mass index (BMI) 33.0-33.9, adult    5. Encephalopathy    6. Cerebral vascular disease    7. Bedbound         Plan:       Irritability  -     Urinalysis; Future; Expected date: 08/15/2024  -     CBC W/ AUTO DIFFERENTIAL; Future; Expected date: 08/15/2024  -     TSH; Future; Expected date: 08/15/2024  -     Vitamin D; Future; Expected date: 08/15/2024  -     COMPREHENSIVE METABOLIC PANEL; Future; Expected date: 08/15/2024    Urinary tract infection associated with indwelling urethral catheter, subsequent encounter  -     CULTURE, URINE; Future; Expected date: 08/15/2024    Suprapubic catheter    Body mass index (BMI) 33.0-33.9, adult    Encephalopathy    Cerebral vascular disease    Bedbound      Reviewed hospital and ER visits. Can repeat cbc, tsh, urinalysis and culture. Continue to monitor. Notify us if any worsening of symptoms

## 2024-08-16 ENCOUNTER — PATIENT MESSAGE (OUTPATIENT)
Dept: FAMILY MEDICINE | Facility: CLINIC | Age: 59
End: 2024-08-16
Payer: MEDICARE

## 2024-08-16 DIAGNOSIS — D64.9 NORMOCYTIC ANEMIA: Primary | ICD-10-CM

## 2024-08-16 DIAGNOSIS — E55.9 VITAMIN D DEFICIENCY: Primary | ICD-10-CM

## 2024-08-16 DIAGNOSIS — N30.00 ACUTE CYSTITIS WITHOUT HEMATURIA: ICD-10-CM

## 2024-08-16 RX ORDER — ERGOCALCIFEROL 1.25 MG/1
50000 CAPSULE ORAL
Qty: 12 CAPSULE | Refills: 0 | Status: SHIPPED | OUTPATIENT
Start: 2024-08-16

## 2024-08-16 RX ORDER — CEFDINIR 300 MG/1
300 CAPSULE ORAL 2 TIMES DAILY
Qty: 20 CAPSULE | Refills: 0 | Status: SHIPPED | OUTPATIENT
Start: 2024-08-16 | End: 2024-08-26

## 2024-08-19 ENCOUNTER — EXTERNAL HOME HEALTH (OUTPATIENT)
Dept: HOME HEALTH SERVICES | Facility: HOSPITAL | Age: 59
End: 2024-08-19
Payer: MEDICARE

## 2024-08-19 ENCOUNTER — DOCUMENT SCAN (OUTPATIENT)
Dept: HOME HEALTH SERVICES | Facility: HOSPITAL | Age: 59
End: 2024-08-19
Payer: MEDICARE

## 2024-08-26 ENCOUNTER — DOCUMENT SCAN (OUTPATIENT)
Dept: HOME HEALTH SERVICES | Facility: HOSPITAL | Age: 59
End: 2024-08-26
Payer: MEDICARE

## 2024-09-19 DIAGNOSIS — F41.9 ANXIETY: ICD-10-CM

## 2024-09-20 RX ORDER — ALPRAZOLAM 0.5 MG/1
0.5 TABLET ORAL 2 TIMES DAILY PRN
Qty: 30 TABLET | Refills: 0 | Status: SHIPPED | OUTPATIENT
Start: 2024-09-20 | End: 2024-10-20

## 2024-09-25 ENCOUNTER — DOCUMENT SCAN (OUTPATIENT)
Dept: HOME HEALTH SERVICES | Facility: HOSPITAL | Age: 59
End: 2024-09-25
Payer: MEDICARE

## 2024-09-26 ENCOUNTER — DOCUMENT SCAN (OUTPATIENT)
Dept: HOME HEALTH SERVICES | Facility: HOSPITAL | Age: 59
End: 2024-09-26
Payer: MEDICARE

## 2024-09-27 ENCOUNTER — DOCUMENT SCAN (OUTPATIENT)
Dept: HOME HEALTH SERVICES | Facility: HOSPITAL | Age: 59
End: 2024-09-27
Payer: MEDICARE

## 2024-09-27 ENCOUNTER — CARE AT HOME (OUTPATIENT)
Dept: HOME HEALTH SERVICES | Facility: CLINIC | Age: 59
End: 2024-09-27
Payer: MEDICARE

## 2024-09-27 DIAGNOSIS — Z93.59 SUPRAPUBIC CATHETER: ICD-10-CM

## 2024-09-27 DIAGNOSIS — Z72.0 VAPES NICOTINE CONTAINING SUBSTANCE: ICD-10-CM

## 2024-09-27 DIAGNOSIS — G81.94 LEFT HEMIPARESIS: ICD-10-CM

## 2024-09-27 DIAGNOSIS — R33.9 URINARY RETENTION: ICD-10-CM

## 2024-09-27 DIAGNOSIS — F17.200 TOBACCO DEPENDENCE: Primary | ICD-10-CM

## 2024-09-27 DIAGNOSIS — L89.154 SACRAL DECUBITUS ULCER, STAGE IV: ICD-10-CM

## 2024-09-27 DIAGNOSIS — Z74.1 REQUIRES ASSISTANCE WITH ACTIVITIES OF DAILY LIVING (ADL): ICD-10-CM

## 2024-09-27 PROCEDURE — 99348 HOME/RES VST EST LOW MDM 30: CPT | Mod: 25,S$GLB,, | Performed by: NURSE PRACTITIONER

## 2024-09-27 PROCEDURE — 3044F HG A1C LEVEL LT 7.0%: CPT | Mod: CPTII,S$GLB,, | Performed by: NURSE PRACTITIONER

## 2024-09-27 PROCEDURE — 3078F DIAST BP <80 MM HG: CPT | Mod: CPTII,S$GLB,, | Performed by: NURSE PRACTITIONER

## 2024-09-27 PROCEDURE — 99406 BEHAV CHNG SMOKING 3-10 MIN: CPT | Mod: S$GLB,,, | Performed by: NURSE PRACTITIONER

## 2024-09-27 PROCEDURE — 1159F MED LIST DOCD IN RCRD: CPT | Mod: CPTII,S$GLB,, | Performed by: NURSE PRACTITIONER

## 2024-09-27 PROCEDURE — 3074F SYST BP LT 130 MM HG: CPT | Mod: CPTII,S$GLB,, | Performed by: NURSE PRACTITIONER

## 2024-09-27 PROCEDURE — 1160F RVW MEDS BY RX/DR IN RCRD: CPT | Mod: CPTII,S$GLB,, | Performed by: NURSE PRACTITIONER

## 2024-09-30 VITALS
TEMPERATURE: 99 F | RESPIRATION RATE: 20 BRPM | OXYGEN SATURATION: 97 % | DIASTOLIC BLOOD PRESSURE: 64 MMHG | SYSTOLIC BLOOD PRESSURE: 120 MMHG | HEART RATE: 78 BPM

## 2024-09-30 PROBLEM — Z74.1 REQUIRES ASSISTANCE WITH ACTIVITIES OF DAILY LIVING (ADL): Status: ACTIVE | Noted: 2024-09-30

## 2024-09-30 PROBLEM — F17.200 TOBACCO DEPENDENCE: Status: ACTIVE | Noted: 2024-09-30

## 2024-09-30 PROBLEM — Z72.0 VAPES NICOTINE CONTAINING SUBSTANCE: Status: ACTIVE | Noted: 2024-09-30

## 2024-09-30 NOTE — ASSESSMENT & PLAN NOTE
Negative effects of tobacco on body systems was discussed with patient in detail. Patient was recommended to stop smoking. Counseled for 4 minutes. Nicotine replacement options were discussed and not prescribed per patient's request.

## 2024-09-30 NOTE — ASSESSMENT & PLAN NOTE
Chronic, S/p CVA.  ADL dependent on wife. Non-ambulatory.  Has chronic sacral/coccyx area pressure ulcer for years.  Followed by PCP.

## 2024-09-30 NOTE — ASSESSMENT & PLAN NOTE
Chronic, related to CVA.  Has chronic indwelling cath via supra pubic site.  Followed by HH for mike care.  Hx of UTIs-recent Ecoli UTI, treatment with oral antibiotics.  Followed by Urology.   Continue HH for mike care.

## 2024-09-30 NOTE — PROGRESS NOTES
"[facility]  Medical Chronic Care Home Visit    Encounter Provider: Jailene Oviedo   PCP: Tanner Seals MD  Consult Requested By: No ref. provider found    HISTORY OF PRESENT ILLNESS      Patient ID: Nadeem Alegre is a 59 y.o. male is being seen in the home due to physical debility that presents a taxing effort to leave the home, to mitigate high risk of hospital readmission and/or due to the limited availability of reliable or safe options for transportation to the point of access to the provider. Prior to treatment on this visit the chart was reviewed and patient verbal consent was obtained.    Chronic medical conditions synopsis:  Nadeem Alegre is a 59 y.o.male with medical/surgical history including encephalitis, chronic disability, nonambulatory, anxiety, anemia, hypertension, hyperlipidemia, Hemiplegia and hemiparesis following cerebral infarction affecting left non-dominant side, chronic pressure ulcer of sacral region, stage 4, Atherosclerotic heart disease of native coronary artery with other forms of angina pectoris, mixed conductive and sensorineural hearing loss, neuromuscular dysfunction of bladder-chronic indwelling catheter via supra pubic site, neurogenic bowel,personal history of urinary (tract)infections, pseudobulbar affect, insomnia, obstructive sleep apnea.      Nadeem is found at home today, spouse is not present but other family members are, but not participating in visit today. Patient is AAO x 2, flat affect, interacts some but it is minimal. He has a vape pen on his chest that he acknowledges is nicotine based and uses it often daily. Mood is stable compared to previous visits. He appears to be in no distress. He was treated for a UTI recently (hx frequent UTIs) has chronic indwelling suprapubic mike.     Patient is followed by [facility] RN for mike care and wound care to chronic coccyx pressure injury that "does not heal"-has been open for at least last 8 years per " wife. Currently, chronic sacral stage IV, followed by Wound  NP weekly and [facility] nursing wound care twice weekly-santyl, sliver alginate current plan of care.     No distress noted. [facility] NP will follow patient every 8-12 weeks and prn due to difficulty leaving home. Program contact information provided to patient and left in home.      DECISION MAKING TODAY       Assessment & Plan:  1. Tobacco dependence [F17.200]  Assessment & Plan:  Negative effects of tobacco on body systems was discussed with patient in detail. Patient was recommended to stop smoking. Counseled for 4 minutes. Nicotine replacement options were discussed and not prescribed per patient's request.       2. Vapes nicotine containing substance [Z72.0]  Assessment & Plan:  Negative effects of tobacco on body systems was discussed with patient in detail. Patient was recommended to stop smoking. Counseled for 4 minutes. Nicotine replacement options were discussed and not prescribed per patient's request.      3. Suprapubic catheter  Overview:  Suprapubic     Assessment & Plan:  Chronic, related to CVA, neurogenic bladder.  Has chronic indwelling catheter via supra pubic site.  Followed by  for mike care and Urology.  Hx of UTIs-hospitalization for UTI,sepsis. Recent oral antibiotics for Ecoli UTI.  Monitor.  Educated on s/s UTI, urine appearance and when to report, adequate oral hydration.   Continue HH.      4. Requires assistance with activities of daily living (ADL)  Assessment & Plan:  Chronic, ADL dependent, due to CVA following ear infection, s/p mastoidectomy, CVA per wife at age 49.   Wife provides care.  Maximal DME use daily.         5. Sacral decubitus ulcer, stage IV  Assessment & Plan:  Chronic, slow healing wound.  Followed by Wound  NP weekly in home and Home Health RN twice weekly. Wife provides care on other day. Currently using santyl/silver alginate.  Educated on  importance of offloading, s/s wound deterioration.  Monitor.       6. Urinary retention  Assessment & Plan:  Chronic, related to CVA.  Has chronic indwelling cath via supra pubic site.  Followed by HH for mike care.  Hx of UTIs-recent Ecoli UTI, treatment with oral antibiotics.  Followed by Urology.   Continue HH for mike care.      7. Left hemiparesis  Assessment & Plan:  Chronic, S/p CVA.  ADL dependent on wife. Non-ambulatory.  Has chronic sacral/coccyx area pressure ulcer for years.  Followed by PCP.             ENVIRONMENT OF CARE      Family and/or Caregiver present at visit?  No  Name of Caregiver: Spouse, Mely not home during visit, other family members present but not participating in visit  History provided by: patient    4Ms for Medical Decision-Making in Older Adults    Last Completed EAWV: None    MOBILITY:  Get Up and Go:       No data to display              Activities of Daily Living:       No data to display              Whisper Test:       No data to display              Disability Status:      4/13/2017     1:17 AM   Disability Status   Are you deaf or do you have serious difficulty hearing? N   Are you blind or do you have serious difficulty seeing, even when wearing glasses? N   Because of a physical, mental, or emotional condition, do you have serious difficulty concentrating, remembering, or making decisions? N   Do you have serious difficulty walking or climbing stairs? Y   Do you have difficulty dressing or bathing? Y   Because of a physical, mental, or emotional condition, do you have difficulty doing errands alone such as visiting a doctor's office or shopping? Y     Nutrition Screening:       No data to display             Screening Score: 0-7 Malnourished, 8-11 At Risk, 12-14 Normal  Fall Risk:      9/27/2024     8:00 AM 8/9/2024     8:00 AM 6/28/2024     8:00 AM   Fall Risk Assessment - Outpatient   Mobility Status Wheelchair Bound Stretcher Stretcher   Number of falls 0 0 0  "  Identified as fall risk True True True           MENTATION:   Depression Patient Health Questionnaire:      3/16/2023     1:57 PM   Depression Patient Health Questionnaire   Over the last two weeks how often have you been bothered by little interest or pleasure in doing things Not at all   Over the last two weeks how often have you been bothered by feeling down, depressed or hopeless Not at all   PHQ-2 Total Score 0     Has Dementia Dx: No  Has Anxiety Dx: No    Cognitive Function Screening:       No data to display              Cognitive Function Screening Total - Less than 4 = Abnormal,  Greater than or equal to 4 = Normal        MEDICATIONS:  High Risk Medications:  Total Active Medications: 2  ALPRAZolam - 0.5 MG  cyclobenzaprine - 10 MG    WHAT MATTERS MOST:  Advance Care Planning   ACP Status:   Patient has had an ACP conversation  Living Will: No  Power of : No  [place]: No    What is most important right now is to focus on spending time at homeavoiding the hospitalremaining as independent as possiblesymptom/pain controlcomfort and QOL     Accordingly, we have decided that the best plan to meet the patient's goals includes continuing with treatment      What matters most to patient today is: "to not have to go to the hospital"           Is patient hospice appropriate: No  (If needed, use PPS <30 or FAST score >7)  Was referral to hospice placed: No    Impression upon entering the home:  Physical Dwelling: single family home   Appearance of home environment: cleaniness: clean, walking pathways: cluttered, lighting: adequate, and home structure: sound structure  Functional Status: max assistance  Mobility: chair bound  Nutritional access: adequate intake and access  Home Health: Yes, HH Agency [facility]    DME/Supplies: hospital bed, wound care supplies, and sheila lift, specialty wheelchair      Diagnostic tests reviewed/disposition: No diagnosic tests pending after this hospitalization.  Disease/illness " education:  debility, chronic pressure injury, vapes tobacco  Establishment or re-establishment of referral orders for community resources: No other necessary community resources.   Discussion with other health care providers: No discussion with other health care providers necessary.   Does patient have a PCP at [facility]? Yes   Repatriation plan with PCP? [facility] reason: mobility   Does patient have an ostomy (ileostomy, colostomy, suprapubic catheter, nephrostomy tube, tracheostomy, PEG tube, pleurex catheter, cholecystostomy, etc)? Yes. Is it on problem list?yes  Were BPAs reviewed? Yes    Social History     Socioeconomic History    Marital status:    Occupational History     Employer: [employer]   Tobacco Use    Smoking status: Every Day     Current packs/day: 0.00     Average packs/day: 0.5 packs/day for 18.0 years (9.0 ttl pk-yrs)     Types: Vaping with nicotine, Cigarettes     Start date: 10/28/1996     Last attempt to quit: 10/28/2014     Years since quittin.9    Smokeless tobacco: Never   Substance and Sexual Activity    Alcohol use: Yes     Alcohol/week: 5.0 standard drinks of alcohol     Types: 6 Standard drinks or equivalent per week     Comment: occ.    Drug use: Never     Social Drivers of Health     Financial Resource Strain: Low Risk  (2024)    Overall Financial Resource Strain (CARDIA)     Difficulty of Paying Living Expenses: Not very hard   Food Insecurity: No Food Insecurity (2024)    Hunger Vital Sign     Worried About Running Out of Food in the Last Year: Never true     Ran Out of Food in the Last Year: Never true   Transportation Needs: No Transportation Needs (2024)    TRANSPORTATION NEEDS     Transportation : No   Physical Activity: Inactive (2023)    Exercise Vital Sign     Days of Exercise per Week: 0 days     Minutes of Exercise per Session: 0 min   Stress: Stress Concern Present (2023)    [place] [employer] - Occupational Stress  Questionnaire     Feeling of Stress : To some extent   Housing Stability: Low Risk  (7/26/2024)    Housing Stability Vital Sign     Unable to Pay for Housing in the Last Year: No     Homeless in the Last Year: No         OBJECTIVE:     Vital Signs:  Vitals:    09/27/24 1600   BP: 120/64   Pulse: 78   Resp: 20   Temp: 98.8 °F (37.1 °C)     Review of Systems   Constitutional:  Positive for activity change, appetite change and fatigue. Negative for fever and unexpected weight change.   HENT: Negative.     Eyes: Negative.    Respiratory: Negative.     Cardiovascular: Negative.    Gastrointestinal: Negative.    Endocrine: Negative.    Genitourinary:         Has suprapubic mike cath-chronic   Musculoskeletal:  Positive for back pain.         Physical Exam:  Physical Exam  Constitutional:       General: He is not in acute distress.     Appearance: He is obese. He is not ill-appearing.      Comments: Minimally interactive   HENT:      Head: Normocephalic and atraumatic.      Right Ear: External ear normal.      Left Ear: External ear normal.      Nose: Nose normal.      Mouth/Throat:      Mouth: Mucous membranes are dry.      Pharynx: Oropharynx is clear.   Eyes:      Extraocular Movements: Extraocular movements intact.      Conjunctiva/sclera: Conjunctivae normal.      Pupils: Pupils are equal, round, and reactive to light.   Cardiovascular:      Rate and Rhythm: Normal rate and regular rhythm.      Pulses: Normal pulses.      Heart sounds: Normal heart sounds.   Pulmonary:      Effort: Pulmonary effort is normal.      Breath sounds: Normal breath sounds.   Abdominal:      General: Bowel sounds are normal.      Palpations: Abdomen is soft.   Genitourinary:     Comments: 16 Fr mike in place via suprapubic site, WNL. Urine appears clear, yellow.  Musculoskeletal:      Cervical back: Neck supple.      Comments: Generalized atrophy   Skin:     General: Skin is warm and dry.      Capillary Refill: Capillary refill takes 2 to  3 seconds.      Comments: Chronic coccyx pressure injury (present for years). No s/s infection but non-healing.   Neurological:      Mental Status: He is alert.      Motor: Weakness present.      Comments: Oriented to person and place, forgetful  Bed bound mostly gets into specialty chair    Psychiatric:      Comments: Flat affect, minimally interactive       INSTRUCTIONS FOR PATIENT:     Scheduled Follow-up, Appts Reviewed with Modifications if Needed: Yes  No future appointments.      Current Outpatient Medications:     ALPRAZolam (XANAX) 0.5 MG tablet, Take 1 tablet (0.5 mg total) by mouth 2 (two) times daily as needed for Anxiety., Disp: 30 tablet, Rfl: 0    ascorbic Acid (VITAMIN C) 500 mg CpSR, Vitamin C 500 mg capsule,extended release  Take 1 capsule twice a day by oral route., Disp: , Rfl:     aspirin (ECOTRIN) 81 MG EC tablet, Take 1 tablet by mouth once daily., Disp: , Rfl:     bisacodyL (DULCOLAX) 10 mg Supp, Place rectally., Disp: , Rfl:     CRANBERRY FRUIT EXTRACT (CRANBERRY CONCENTRATE ORAL), Take 1 oz by mouth every evening., Disp: , Rfl:     cyclobenzaprine (FLEXERIL) 10 MG tablet, Take 10 mg by mouth 3 (three) times daily as needed for Muscle spasms., Disp: , Rfl:     docusate sodium (COLACE) 100 MG capsule, Take 200 mg by mouth 2 (two) times daily. , Disp: , Rfl:     ergocalciferol (ERGOCALCIFEROL) 50,000 unit Cap, Take 1 capsule (50,000 Units total) by mouth every 7 days., Disp: 12 capsule, Rfl: 0    FENOFIBRATE MICRONIZED ORAL, Take 48 mg by mouth once daily., Disp: , Rfl:     fish oil-omega-3 fatty acids 300-1,000 mg capsule, Take 2 g by mouth 2 (two) times daily. , Disp: , Rfl:     ibuprofen (ADVIL,MOTRIN) 800 MG tablet, Take 1 tablet (800 mg total) by mouth every 8 (eight) hours as needed for Pain., Disp: 90 tablet, Rfl: 5    Lactobacillus rhamnosus GG (CULTURELLE) 10 billion cell capsule, Take 1 capsule by mouth once daily., Disp: 14 capsule, Rfl: 0    magnesium oxide (MAG-OX) 400 mg tablet,  Take 400 mg by mouth 2 (two) times daily. , Disp: , Rfl:     melatonin 5 mg Tab, Take by mouth nightly as needed. , Disp: , Rfl:     modafiniL (PROVIGIL) 200 MG Tab, Take 1 tablet by mouth once daily., Disp: , Rfl:     multivitamin (THERAGRAN) per tablet, Take 1 tablet by mouth once daily., Disp: , Rfl:     omeprazole (PRILOSEC) 20 MG capsule, Take 20 mg by mouth once daily., Disp: , Rfl:     ondansetron (ZOFRAN-ODT) 8 MG TbDL, Take 1 tablet (8 mg total) by mouth every 8 (eight) hours as needed., Disp: 10 tablet, Rfl: 2    pantoprazole (PROTONIX) 40 MG tablet, Take 1 tablet by mouth once daily., Disp: , Rfl:     polyethylene glycol (GLYCOLAX) 17 gram PwPk, Take 17 g by mouth 2 (two) times daily., Disp: 100 each, Rfl: 6    potassium chloride SA (K-DUR,KLOR-CON M) 10 MEQ tablet, Take by mouth 2 (two) times daily., Disp: , Rfl:     senna (SENOKOT) 8.6 mg tablet, Take 2 tablets by mouth 2 (two) times daily. , Disp: , Rfl:     sucralfate (CARAFATE) 1 gram tablet, Take 1 g by mouth 4 (four) times daily., Disp: , Rfl:     vitamin E 100 UNIT capsule, Take 100 Units by mouth once daily., Disp: , Rfl:     zinc gluconate 50 mg tablet, Take 50 mg by mouth once daily., Disp: , Rfl:     albuterol (VENTOLIN HFA) 90 mcg/actuation inhaler, Inhale 2 puffs into the lungs every 4 (four) hours as needed for Wheezing or Shortness of Breath. Rescue, Disp: 18 g, Rfl: 3    cetirizine (ZYRTEC) 1 mg/mL syrup, Take 10 mLs (10 mg total) by mouth once daily., Disp: 473 mL, Rfl: 0    mupirocin (BACTROBAN) 2 % ointment, SMARTSI Application Topical 2-3 Times Daily (Patient not taking: Reported on 2024), Disp: , Rfl:     Medication Reconciliation:  Were medications changed during this appointment? No  Were medications in the home? Yes  Is the patient taking the medications as directed? Yes  Does the patient/caregiver understand the medications and changes? Yes  Does updated med list accurately reflects meds patient is currently taking?  Yes    I spent a total of 18 minutes on the day of the visit.This includes face to face time and non-face to face time preparing to see the patient (eg, review of tests), obtaining and/or reviewing separately obtained history, documenting clinical information in the electronic or other health record, independently interpreting results and communicating results to the patient/family/caregiver, or care coordinator.        Signature: Jailene Oviedo NP

## 2024-09-30 NOTE — ASSESSMENT & PLAN NOTE
Chronic, related to CVA, neurogenic bladder.  Has chronic indwelling catheter via supra pubic site.  Followed by HH for mike care and Urology.  Hx of UTIs-hospitalization for UTI,sepsis. Recent oral antibiotics for Ecoli UTI.  Monitor.  Educated on s/s UTI, urine appearance and when to report, adequate oral hydration.   Continue HH.

## 2024-09-30 NOTE — ASSESSMENT & PLAN NOTE
Chronic, slow healing wound.  Followed by Wound  NP weekly in home and Home Health RN twice weekly. Wife provides care on other day. Currently using santyl/silver alginate.  Educated on importance of offloading, s/s wound deterioration.  Monitor.

## 2024-10-12 PROCEDURE — G0179 MD RECERTIFICATION HHA PT: HCPCS | Mod: ,,, | Performed by: INTERNAL MEDICINE

## 2024-10-28 PROBLEM — R65.21 SEPTIC SHOCK: Status: RESOLVED | Noted: 2017-04-12 | Resolved: 2024-10-28

## 2024-10-28 PROBLEM — T83.511A URINARY TRACT INFECTION ASSOCIATED WITH INDWELLING URETHRAL CATHETER: Status: RESOLVED | Noted: 2017-04-12 | Resolved: 2024-10-28

## 2024-10-28 PROBLEM — N17.9 AKI (ACUTE KIDNEY INJURY): Status: RESOLVED | Noted: 2024-07-26 | Resolved: 2024-10-28

## 2024-10-28 PROBLEM — N39.0 URINARY TRACT INFECTION ASSOCIATED WITH INDWELLING URETHRAL CATHETER: Status: RESOLVED | Noted: 2017-04-12 | Resolved: 2024-10-28

## 2024-10-28 PROBLEM — A41.9 SEPTIC SHOCK: Status: RESOLVED | Noted: 2017-04-12 | Resolved: 2024-10-28

## 2024-11-06 ENCOUNTER — DOCUMENT SCAN (OUTPATIENT)
Dept: HOME HEALTH SERVICES | Facility: HOSPITAL | Age: 59
End: 2024-11-06
Payer: MEDICARE

## 2024-11-11 ENCOUNTER — PATIENT MESSAGE (OUTPATIENT)
Dept: FAMILY MEDICINE | Facility: CLINIC | Age: 59
End: 2024-11-11
Payer: MEDICARE

## 2024-11-12 ENCOUNTER — EXTERNAL HOME HEALTH (OUTPATIENT)
Dept: HOME HEALTH SERVICES | Facility: HOSPITAL | Age: 59
End: 2024-11-12
Payer: MEDICARE

## 2024-11-12 ENCOUNTER — PATIENT MESSAGE (OUTPATIENT)
Dept: FAMILY MEDICINE | Facility: CLINIC | Age: 59
End: 2024-11-12
Payer: MEDICARE

## 2024-11-12 ENCOUNTER — DOCUMENT SCAN (OUTPATIENT)
Dept: HOME HEALTH SERVICES | Facility: HOSPITAL | Age: 59
End: 2024-11-12
Payer: MEDICARE

## 2024-11-12 DIAGNOSIS — N39.0 URINARY TRACT INFECTION ASSOCIATED WITH CYSTOSTOMY CATHETER, INITIAL ENCOUNTER: Primary | ICD-10-CM

## 2024-11-12 DIAGNOSIS — T83.510A URINARY TRACT INFECTION ASSOCIATED WITH CYSTOSTOMY CATHETER, INITIAL ENCOUNTER: Primary | ICD-10-CM

## 2024-11-13 ENCOUNTER — CARE AT HOME (OUTPATIENT)
Dept: HOME HEALTH SERVICES | Facility: CLINIC | Age: 59
End: 2024-11-13
Payer: MEDICARE

## 2024-11-13 DIAGNOSIS — T83.511A URINARY TRACT INFECTION ASSOCIATED WITH INDWELLING URETHRAL CATHETER, INITIAL ENCOUNTER: Primary | ICD-10-CM

## 2024-11-13 DIAGNOSIS — N39.0 URINARY TRACT INFECTION ASSOCIATED WITH INDWELLING URETHRAL CATHETER, INITIAL ENCOUNTER: Primary | ICD-10-CM

## 2024-11-13 PROCEDURE — 99442 PR PHYSICIAN TELEPHONE EVALUATION 11-20 MIN: CPT | Mod: 95,,, | Performed by: NURSE PRACTITIONER

## 2024-11-13 PROCEDURE — 3044F HG A1C LEVEL LT 7.0%: CPT | Mod: CPTII,95,, | Performed by: NURSE PRACTITIONER

## 2024-11-13 RX ORDER — CEFDINIR 300 MG/1
300 CAPSULE ORAL 2 TIMES DAILY
Qty: 14 CAPSULE | Refills: 0 | Status: SHIPPED | OUTPATIENT
Start: 2024-11-13 | End: 2024-11-20

## 2024-11-13 RX ORDER — AMOXICILLIN 875 MG/1
875 TABLET, FILM COATED ORAL EVERY 12 HOURS
Qty: 14 TABLET | Refills: 0 | Status: SHIPPED | OUTPATIENT
Start: 2024-11-13 | End: 2024-11-20

## 2024-11-13 RX ORDER — NITROFURANTOIN 25; 75 MG/1; MG/1
100 CAPSULE ORAL 2 TIMES DAILY
Qty: 14 CAPSULE | Refills: 0 | Status: SHIPPED | OUTPATIENT
Start: 2024-11-13

## 2024-11-18 NOTE — PROGRESS NOTES
Established Patient - Audio Only Telehealth Visit     The patient location is: patient's home, [place], [place]  The chief complaint leading to consultation is: Urine culture results back, wife requesting antibiotics  Visit type: Virtual visit with audio only (telephone)  Total time spent with patient: 12 minutes       The reason for the audio only service rather than synchronous audio and video virtual visit was related to technical difficulties or patient preference/necessity.     Each patient to whom I provide medical services by telemedicine is:  (1) informed of the relationship between the physician and patient and the respective role of any other health care provider with respect to management of the patient; and (2) notified that they may decline to receive medical services by telemedicine and may withdraw from such care at any time. Patient verbally consented to receive this service via voice-only telephone call.    Nadeem Alegre is a 59 y.o.male with medical/surgical history including encephalitis, chronic disability, nonambulatory, anxiety, anemia, hypertension, hyperlipidemia, Hemiplegia and hemiparesis following cerebral infarction affecting left non-dominant side, chronic pressure ulcer of sacral region, stage 4, Atherosclerotic heart disease of native coronary artery with other forms of angina pectoris, mixed conductive and sensorineural hearing loss, neuromuscular dysfunction of bladder-chronic indwelling catheter via supra pubic site, neurogenic bowel,personal history of urinary (tract)infections, pseudobulbar affect, insomnia, obstructive sleep apnea.      HPI:      Patient's wife, Mely, called this provider to report she couldn't get in touch with patient's PCP and patient has a UTI per culture obtained 11/8/24. She reports patient is having acting out behaviors that are usually indicative of UTI and she checked the culture results that show 2 organisms.  Patient has history of recurrent UTIs and  has chronic suprapubic catheter. Denies fever, chills, N/V/D, hematuria. She reports urine appears yellow in mike bag.    Assessment and plan:       Urinary tract infection associated with indwelling urethral catheter  UA obtained 11/8/24, culture + with:  ESCHERICHIA COLI   ENTEROCOCCUS FAECALIS     Both are sensitive to nitrofurantoin.  RX sent to desired pharmacy.  Monitor.  Report to ER if fever, chills, nausea, vomiting, hematuria develops.          Jailene Oviedo, [place]         This service was not originating from a related E/M service provided within the previous 7 days nor will  to an E/M service or procedure within the next 24 hours or my soonest available appointment.  Prevailing standard of care was able to be met in this audio-only visit.

## 2024-11-18 NOTE — ASSESSMENT & PLAN NOTE
UA obtained 11/8/24, culture + with:  ESCHERICHIA COLI   ENTEROCOCCUS FAECALIS     Both are sensitive to nitrofurantoin.  RX sent to desired pharmacy.  Monitor.  Report to ER if fever, chills, nausea, vomiting, hematuria develops.

## 2024-11-22 ENCOUNTER — DOCUMENT SCAN (OUTPATIENT)
Dept: HOME HEALTH SERVICES | Facility: HOSPITAL | Age: 59
End: 2024-11-22
Payer: MEDICARE

## 2024-12-11 ENCOUNTER — DOCUMENT SCAN (OUTPATIENT)
Dept: HOME HEALTH SERVICES | Facility: HOSPITAL | Age: 59
End: 2024-12-11
Payer: MEDICARE

## 2024-12-11 PROCEDURE — G0179 MD RECERTIFICATION HHA PT: HCPCS | Mod: ,,, | Performed by: INTERNAL MEDICINE

## 2024-12-18 ENCOUNTER — DOCUMENT SCAN (OUTPATIENT)
Dept: HOME HEALTH SERVICES | Facility: HOSPITAL | Age: 59
End: 2024-12-18
Payer: MEDICARE

## 2024-12-31 ENCOUNTER — DOCUMENT SCAN (OUTPATIENT)
Dept: HOME HEALTH SERVICES | Facility: HOSPITAL | Age: 59
End: 2024-12-31
Payer: MEDICARE

## 2025-01-17 ENCOUNTER — DOCUMENT SCAN (OUTPATIENT)
Dept: HOME HEALTH SERVICES | Facility: HOSPITAL | Age: 60
End: 2025-01-17
Payer: MEDICARE

## 2025-01-28 ENCOUNTER — EXTERNAL HOME HEALTH (OUTPATIENT)
Dept: HOME HEALTH SERVICES | Facility: HOSPITAL | Age: 60
End: 2025-01-28
Payer: MEDICARE

## 2025-01-30 DIAGNOSIS — Z00.00 ENCOUNTER FOR MEDICARE ANNUAL WELLNESS EXAM: ICD-10-CM

## 2025-02-13 ENCOUNTER — DOCUMENT SCAN (OUTPATIENT)
Dept: HOME HEALTH SERVICES | Facility: HOSPITAL | Age: 60
End: 2025-02-13
Payer: MEDICARE

## 2025-03-07 ENCOUNTER — CARE AT HOME (OUTPATIENT)
Dept: HOME HEALTH SERVICES | Facility: CLINIC | Age: 60
End: 2025-03-07
Payer: MEDICARE

## 2025-03-07 DIAGNOSIS — Z93.59 SUPRAPUBIC CATHETER: ICD-10-CM

## 2025-03-07 DIAGNOSIS — Z74.01 BEDBOUND: ICD-10-CM

## 2025-03-07 DIAGNOSIS — I69.354 HEMIPARESIS AFFECTING LEFT SIDE AS LATE EFFECT OF CEREBROVASCULAR ACCIDENT: ICD-10-CM

## 2025-03-07 DIAGNOSIS — N39.0 URINARY TRACT INFECTION ASSOCIATED WITH INDWELLING URETHRAL CATHETER, INITIAL ENCOUNTER: ICD-10-CM

## 2025-03-07 DIAGNOSIS — T83.511A URINARY TRACT INFECTION ASSOCIATED WITH INDWELLING URETHRAL CATHETER, INITIAL ENCOUNTER: ICD-10-CM

## 2025-03-07 DIAGNOSIS — L89.154 SACRAL DECUBITUS ULCER, STAGE IV: Primary | ICD-10-CM

## 2025-03-07 DIAGNOSIS — F07.0 PERSONALITY CHANGE AS LATE EFFECT OF CEREBROVASCULAR ACCIDENT (CVA): ICD-10-CM

## 2025-03-07 DIAGNOSIS — Z72.0 VAPES NICOTINE CONTAINING SUBSTANCE: ICD-10-CM

## 2025-03-07 DIAGNOSIS — E66.01 MORBID OBESITY: ICD-10-CM

## 2025-03-07 DIAGNOSIS — I69.398 PERSONALITY CHANGE AS LATE EFFECT OF CEREBROVASCULAR ACCIDENT (CVA): ICD-10-CM

## 2025-03-07 PROCEDURE — 1159F MED LIST DOCD IN RCRD: CPT | Mod: CPTII,S$GLB,, | Performed by: NURSE PRACTITIONER

## 2025-03-07 PROCEDURE — 3074F SYST BP LT 130 MM HG: CPT | Mod: CPTII,S$GLB,, | Performed by: NURSE PRACTITIONER

## 2025-03-07 PROCEDURE — 1160F RVW MEDS BY RX/DR IN RCRD: CPT | Mod: CPTII,S$GLB,, | Performed by: NURSE PRACTITIONER

## 2025-03-07 PROCEDURE — 3078F DIAST BP <80 MM HG: CPT | Mod: CPTII,S$GLB,, | Performed by: NURSE PRACTITIONER

## 2025-03-07 PROCEDURE — 99406 BEHAV CHNG SMOKING 3-10 MIN: CPT | Mod: S$GLB,,, | Performed by: NURSE PRACTITIONER

## 2025-03-07 PROCEDURE — 99349 HOME/RES VST EST MOD MDM 40: CPT | Mod: 25,S$GLB,, | Performed by: NURSE PRACTITIONER

## 2025-03-08 VITALS
HEART RATE: 88 BPM | RESPIRATION RATE: 16 BRPM | DIASTOLIC BLOOD PRESSURE: 78 MMHG | SYSTOLIC BLOOD PRESSURE: 128 MMHG | TEMPERATURE: 98 F | OXYGEN SATURATION: 97 %

## 2025-03-08 RX ORDER — NITROFURANTOIN 25; 75 MG/1; MG/1
100 CAPSULE ORAL 2 TIMES DAILY
Qty: 14 CAPSULE | Refills: 0 | Status: SHIPPED | OUTPATIENT
Start: 2025-03-08

## 2025-03-08 NOTE — PROGRESS NOTES
"[facility]  Medical Chronic Care Home Visit    Encounter Provider: Jailene Oviedo   PCP: Tanner Seals MD  Consult Requested By: No ref. provider found    HISTORY OF PRESENT ILLNESS      Patient ID: Nadeem Alegre is a 59 y.o. male is being seen in the home due to physical debility that presents a taxing effort to leave the home, to mitigate high risk of hospital readmission and/or due to the limited availability of reliable or safe options for transportation to the point of access to the provider. Prior to treatment on this visit the chart was reviewed and patient verbal consent was obtained.    Chronic medical conditions synopsis:  Nadeem Alegre is a 59 y.o.male with medical/surgical history including encephalitis, chronic disability, nonambulatory, anxiety, anemia, hypertension, hyperlipidemia, Hemiplegia and hemiparesis following cerebral infarction affecting left non-dominant side, chronic pressure ulcer of sacral region, stage 4, Atherosclerotic heart disease of native coronary artery with other forms of angina pectoris, mixed conductive and sensorineural hearing loss, neuromuscular dysfunction of bladder-chronic indwelling catheter via supra pubic site, neurogenic bowel,personal history of urinary (tract)infections, pseudobulbar affect, insomnia, obstructive sleep apnea.     Nadeem is found at home today, spouse is present and reports patient has been "acting up". She states when "he doesn't get his way with his medical marijuana" he calls his sister and brother in other states to tell them that [facility] and [facility] are not bathing him, not brushing his teeth or caring for him. He complains of pain at his catheter site intermittently and wants his medical marijuana "all day" per wife not "as needed". Patient's siblings out of state contacted Adult Protective Services and they made a visit yesterday to the home to interview patient, wife was not present.     Wife states that patient is very " "manipulative "when he doesn't get his way". He states he wants to live in a nursing home "because they will give me my marijuana".    Wife feels he is showing signs of UTI with his behavior changes and foul odor of urine. No fever, N/V, abdominal pain, hematuria.He has a history of UTIs and has chronic indwelling suprapubic mike. .    With visit today patient is AAO x 2, flat affect, interacts some but it is minimal. He has a vape pen on his chest that he acknowledges is nicotine based and uses it often daily. Mood is stable compared to previous visits.      Patient is followed by [facility] RN for mike care and wound care to chronic coccyx pressure injury that "does not heal"-has been open for at least last 8 years per wife. Currently, chronic sacral stage IV, followed by Wound  NP weekly and [facility] nursing wound care twice weekly-santyl, sliver alginate current plan of care.    Wife is requesting a Psychiatric consult. Patient has "not been the same" since encephalopathy and CVA.     No distress noted. [facility] NP will follow patient every 8-12 weeks and prn due to difficulty leaving home. Program contact information provided to patient and left in home.         DECISION MAKING TODAY       Assessment & Plan:  1. Sacral decubitus ulcer, stage IV  Assessment & Plan:  Chronic, slow healing wound.  Followed by Wound  NP weekly in home and Home Health RN twice weekly. Wife provides care on other day. Currently using santyl/silver alginate.  Educated on importance of offloading, s/s wound deterioration.  Monitor.       2. Urinary tract infection associated with indwelling urethral catheter, initial encounter  Assessment & Plan:  Hx of frequent UTIs.  Current s/s personality changes/angry, uncooperative and foul smelling urine.  Has suprapubic cath.    Orders:  -     nitrofurantoin, macrocrystal-monohydrate, (MACROBID) 100 MG capsule; Take 1 capsule (100 mg " total) by mouth 2 (two) times daily.  Dispense: 14 capsule; Refill: 0    3. Bedbound  Assessment & Plan:  Chronic, ADL dependent, due to CVA following ear infection, s/p mastoidectomy, CVA per wife at age 49.   Wife provides care.  Maximal DME use daily.      4. Vapes nicotine containing substance [Z72.0]  Assessment & Plan:  Negative effects of tobacco on body systems was discussed with patient in detail. Patient was recommended to stop smoking. Counseled for 3 minutes. Nicotine replacement options were discussed and not prescribed per patient's request.       5. 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  Psychiatry consult requested.    Orders:  -     Ambulatory referral/consult to Psychiatry; Future; Expected date: 03/15/2025    6. Morbid obesity  Assessment & Plan:  Unable to stand for weight but last reported weight 267 lbs. Pt non-ambulatory, bed bound/specialty chair bound, unable to perform any exercises due to left hemiparesis.  Morbid obesity compounds patient co-morbidities and complicates treatment course. Counseling given regarding diet modification and exercise recommendations.      7. Hemiparesis affecting left side as late effect of cerebrovascular accident  Assessment & Plan:  Chronic, S/p CVA.  ADL dependent on wife. Non-ambulatory.  Has chronic sacral/coccyx area pressure ulcer for years-managed by [facility] and Wound  NP home visits.  Continue offloading, ensure adequate protein intake.  Followed by PCP.    Orders:  -     Ambulatory referral/consult to Psychiatry; Future; Expected date: 03/15/2025    8. Suprapubic catheter  Overview:  Suprapubic     Assessment & Plan:  Chronic, related to CVA, neurogenic bladder.  Has chronic indwelling catheter via supra pubic site.  Followed by [facility] for mike care and Urology.  Hx of UTIs-hospitalization for UTI,sepsis.   Monitor.  Educated on  s/s UTI, urine appearance and when to report, adequate oral hydration.   Continue HH.             ENVIRONMENT OF CARE      Family and/or Caregiver present at visit?  Yes  Name of Caregiver: spouse, Mely  History provided by: caregiver    4Ms for Medical Decision-Making in Older Adults    Last Completed EAWV:  None    MEDICATIONS:  High Risk Medications:  Total Active Medications: 1  cyclobenzaprine - 10 MG    MOBILITY:  Activities of Daily Living:       No data to display              Fall Risk:      3/7/2025     8:00 AM 9/27/2024     8:00 AM 8/9/2024     8:00 AM   Fall Risk Assessment - Outpatient   Mobility Status Stretcher Wheelchair Bound Stretcher   Number of falls 0 0 0   Identified as fall risk True True True     Disability Status:      4/13/2017     1:17 AM   Disability Status   Are you deaf or do you have serious difficulty hearing? N   Are you blind or do you have serious difficulty seeing, even when wearing glasses? N   Because of a physical, mental, or emotional condition, do you have serious difficulty concentrating, remembering, or making decisions? N   Do you have serious difficulty walking or climbing stairs? Y   Do you have difficulty dressing or bathing? Y   Because of a physical, mental, or emotional condition, do you have difficulty doing errands alone such as visiting a doctor's office or shopping? Y     Nutrition Screening:       No data to display             Screening Score: 0-7 Malnourished, 8-11 At Risk, 12-14 Normal  Get Up and Go:       No data to display              Whisper Test:       No data to display                    MENTATION:   Has Dementia Dx: No  Has Anxiety Dx: No    Depression Patient Health Questionnaire:      3/16/2023     1:57 PM   Depression Patient Health Questionnaire   Over the last two weeks how often have you been bothered by little interest or pleasure in doing things Not at all    Over the last two weeks how often have you been bothered by feeling down,  depressed or hopeless Not at all   PHQ-2 Total Score 0       Data saved with a previous flowsheet row definition     Cognitive Function Screening:       No data to display              Cognitive Function Screening Total - Less than 4 = Abnormal,  Greater than or equal to 4 = Normal        WHAT MATTERS MOST:  Advance Care Planning   ACP Status:   Patient has had an ACP conversation  Living Will: No  Power of : No  [place]: No    What is most important right now is to focus on spending time at homeavoiding the hospitalremaining as independent as possiblesymptom/pain controlcomfort and QOL     Accordingly, we have decided that the best plan to meet the patient's goals includes continuing with treatment      What matters most to patient today is: pt not able to state           Is patient hospice appropriate: No  (If needed, use PPS <30 or FAST score >7)  Was referral to hospice placed: No    Impression upon entering the home:  Physical Dwelling: single family home   Appearance of home environment: cleaniness: clean, walking pathways: clear, lighting: adequate, and home structure: sound structure  Functional Status: max assistance  Mobility: chair bound  Nutritional access: adequate intake and access  Home Health: Yes,  Ag[facility]    DME/Supplies: hospital bed, wound care supplies, power scooter, and mike supplies      Diagnostic tests reviewed/disposition: No diagnosic tests pending after this hospitalization.  Disease/illness education:  mood disorder, UTI s/s, chronic mike  Establishment or re-establishment of referral orders for community resources: No other necessary community resources.   Discussion with other health care providers: No discussion with other health care providers necessary.   Does patient have a PCP at [facility]? Yes   Repatriation plan with PCP? [facility] reason: mobility   Does patient have an ostomy (ileostomy, colostomy, suprapubic catheter, nephrostomy tube, tracheostomy, PEG tube,  pleurex catheter, cholecystostomy, etc)? Yes. Is it on problem list?yes  Were BPAs reviewed? Yes    Social History     Socioeconomic History    Marital status:    Occupational History     Employer: [employer]   Tobacco Use    Smoking status: Every Day     Current packs/day: 0.00     Average packs/day: 0.5 packs/day for 18.0 years (9.0 ttl pk-yrs)     Types: Vaping with nicotine, Cigarettes     Start date: 10/28/1996     Last attempt to quit: 10/28/2014     Years since quitting: 10.3    Smokeless tobacco: Never   Substance and Sexual Activity    Alcohol use: Yes     Alcohol/week: 5.0 standard drinks of alcohol     Types: 6 Standard drinks or equivalent per week     Comment: occ.    Drug use: Never     Social Drivers of Health     Financial Resource Strain: Low Risk  (7/26/2024)    Overall Financial Resource Strain (CARDIA)     Difficulty of Paying Living Expenses: Not very hard   Food Insecurity: No Food Insecurity (7/26/2024)    Hunger Vital Sign     Worried About Running Out of Food in the Last Year: Never true     Ran Out of Food in the Last Year: Never true   Transportation Needs: No Transportation Needs (7/26/2024)    TRANSPORTATION NEEDS     Transportation : No   Physical Activity: Inactive (11/20/2023)    Exercise Vital Sign     Days of Exercise per Week: 0 days     Minutes of Exercise per Session: 0 min   Stress: Stress Concern Present (11/20/2023)    [employer] [place] [employer] - Occupational Stress Questionnaire     Feeling of Stress : To some extent   Housing Stability: Unknown (7/26/2024)    Housing Stability Vital Sign     Unable to Pay for Housing in the Last Year: No     Homeless in the Last Year: No         OBJECTIVE:     Vital Signs:  Vitals:    03/07/25 1320   BP: 128/78   Pulse: 88   Resp: 16   Temp: 97.9 °F (36.6 °C)       Review of Systems   Constitutional:  Positive for activity change, appetite change and fatigue. Negative for fever and unexpected weight change.   HENT:  Negative.     Eyes: Negative.    Respiratory: Negative.     Cardiovascular: Negative.    Gastrointestinal: Negative.    Endocrine: Negative.    Genitourinary:         Has suprapubic mike cath-chronic   Musculoskeletal:  Positive for back pain.         Physical Exam:  Physical Exam  Constitutional:       General: He is not in acute distress.     Appearance: He is obese. He is not ill-appearing.      Comments: Minimally interactive   HENT:      Head: Normocephalic and atraumatic.      Right Ear: External ear normal.      Left Ear: External ear normal.      Nose: Nose normal.      Mouth/Throat:      Mouth: Mucous membranes are dry.      Pharynx: Oropharynx is clear.   Eyes:      Extraocular Movements: Extraocular movements intact.      Conjunctiva/sclera: Conjunctivae normal.      Pupils: Pupils are equal, round, and reactive to light.   Cardiovascular:      Rate and Rhythm: Normal rate and regular rhythm.      Pulses: Normal pulses.      Heart sounds: Normal heart sounds.   Pulmonary:      Effort: Pulmonary effort is normal.      Breath sounds: Normal breath sounds.   Abdominal:      General: Bowel sounds are normal.      Palpations: Abdomen is soft.   Genitourinary:     Comments: 16 Fr mike in place via suprapubic site, WNL. Urine appears clear, yellow. Room has odor of foul urine.  Musculoskeletal:      Cervical back: Neck supple.      Comments: Generalized atrophy   Skin:     General: Skin is warm and dry.      Capillary Refill: Capillary refill takes 2 to 3 seconds.      Comments: Chronic coccyx pressure injury (present for years). No s/s infection but non-healing.   Neurological:      Mental Status: He is alert.      Motor: Weakness present.      Comments: Oriented to person and place, forgetful  Bed bound mostly gets into specialty chair    Psychiatric:      Comments: Flat affect, minimally interactive       INSTRUCTIONS FOR PATIENT:     Scheduled Follow-up, Appts Reviewed with Modifications if Needed: Yes  No  future appointments.    Current Medications[1]    Medication Reconciliation:  Were medications changed during this appointment? Yes  Were medications in the home? Antibiotics ordered, wife to  from pharmacy  Is the patient taking the medications as directed? Yes  Does the patient/caregiver understand the medications and changes? Yes  Does updated med list accurately reflects meds patient is currently taking? Yes    I spent a total of 40 minutes on the day of the visit.This includes face to face time and non-face to face time preparing to see the patient (eg, review of tests), obtaining and/or reviewing separately obtained history, documenting clinical information in the electronic or other health record, independently interpreting results and communicating results to the patient/family/caregiver, or care coordinator.        Signature: Jailene Oviedo NP         [1]   Current Outpatient Medications:     ascorbic Acid (VITAMIN C) 500 mg CpSR, Vitamin C 500 mg capsule,extended release  Take 1 capsule twice a day by oral route., Disp: , Rfl:     aspirin (ECOTRIN) 81 MG EC tablet, Take 1 tablet by mouth once daily., Disp: , Rfl:     bisacodyL (DULCOLAX) 10 mg Supp, Place rectally., Disp: , Rfl:     cyclobenzaprine (FLEXERIL) 10 MG tablet, Take 10 mg by mouth 3 (three) times daily as needed for Muscle spasms., Disp: , Rfl:     docusate sodium (COLACE) 100 MG capsule, Take 200 mg by mouth 2 (two) times daily. , Disp: , Rfl:     ergocalciferol (ERGOCALCIFEROL) 50,000 unit Cap, Take 1 capsule (50,000 Units total) by mouth every 7 days., Disp: 12 capsule, Rfl: 0    FENOFIBRATE MICRONIZED ORAL, Take 48 mg by mouth once daily., Disp: , Rfl:     fish oil-omega-3 fatty acids 300-1,000 mg capsule, Take 2 g by mouth 2 (two) times daily. , Disp: , Rfl:     ibuprofen (ADVIL,MOTRIN) 800 MG tablet, Take 1 tablet (800 mg total) by mouth every 8 (eight) hours as needed for Pain., Disp: 90 tablet, Rfl: 5    Lactobacillus  rhamnosus GG (CULTURELLE) 10 billion cell capsule, Take 1 capsule by mouth once daily., Disp: 14 capsule, Rfl: 0    magnesium oxide (MAG-OX) 400 mg tablet, Take 400 mg by mouth 2 (two) times daily. , Disp: , Rfl:     melatonin 5 mg Tab, Take by mouth nightly as needed. , Disp: , Rfl:     modafiniL (PROVIGIL) 200 MG Tab, Take 1 tablet by mouth once daily., Disp: , Rfl:     multivitamin (THERAGRAN) per tablet, Take 1 tablet by mouth once daily., Disp: , Rfl:     omeprazole (PRILOSEC) 20 MG capsule, Take 20 mg by mouth once daily., Disp: , Rfl:     ondansetron (ZOFRAN-ODT) 8 MG TbDL, Take 1 tablet (8 mg total) by mouth every 8 (eight) hours as needed., Disp: 10 tablet, Rfl: 2    pantoprazole (PROTONIX) 40 MG tablet, Take 1 tablet by mouth once daily., Disp: , Rfl:     polyethylene glycol (GLYCOLAX) 17 gram PwPk, Take 17 g by mouth 2 (two) times daily., Disp: 100 each, Rfl: 6    potassium chloride SA (K-DUR,KLOR-CON M) 10 MEQ tablet, Take by mouth 2 (two) times daily., Disp: , Rfl:     senna (SENOKOT) 8.6 mg tablet, Take 2 tablets by mouth 2 (two) times daily. , Disp: , Rfl:     sucralfate (CARAFATE) 1 gram tablet, Take 1 g by mouth 4 (four) times daily., Disp: , Rfl:     vitamin E 100 UNIT capsule, Take 100 Units by mouth once daily., Disp: , Rfl:     zinc gluconate 50 mg tablet, Take 50 mg by mouth once daily., Disp: , Rfl:     albuterol (VENTOLIN HFA) 90 mcg/actuation inhaler, Inhale 2 puffs into the lungs every 4 (four) hours as needed for Wheezing or Shortness of Breath. Rescue, Disp: 18 g, Rfl: 3    ALPRAZolam (XANAX) 0.5 MG tablet, Take 1 tablet (0.5 mg total) by mouth 2 (two) times daily as needed for Anxiety., Disp: 30 tablet, Rfl: 0    cetirizine (ZYRTEC) 1 mg/mL syrup, Take 10 mLs (10 mg total) by mouth once daily., Disp: 473 mL, Rfl: 0    CRANBERRY FRUIT EXTRACT (CRANBERRY CONCENTRATE ORAL), Take 1 oz by mouth every evening., Disp: , Rfl:     mupirocin (BACTROBAN) 2 % ointment, SMARTSI Application Topical  2-3 Times Daily (Patient not taking: Reported on 9/30/2024), Disp: , Rfl:     nitrofurantoin, macrocrystal-monohydrate, (MACROBID) 100 MG capsule, Take 1 capsule (100 mg total) by mouth 2 (two) times daily., Disp: 14 capsule, Rfl: 0

## 2025-03-10 PROBLEM — F07.0: Status: ACTIVE | Noted: 2025-03-10

## 2025-03-10 PROBLEM — I69.354 HEMIPARESIS AFFECTING LEFT SIDE AS LATE EFFECT OF CEREBROVASCULAR ACCIDENT: Status: ACTIVE | Noted: 2017-07-13

## 2025-03-10 PROBLEM — I69.398: Status: ACTIVE | Noted: 2025-03-10

## 2025-03-10 NOTE — ASSESSMENT & PLAN NOTE
Unable to stand for weight but last reported weight 267 lbs. Pt non-ambulatory, bed bound/specialty chair bound, unable to perform any exercises due to left hemiparesis.  Morbid obesity compounds patient co-morbidities and complicates treatment course. Counseling given regarding diet modification and exercise recommendations.

## 2025-03-10 NOTE — ASSESSMENT & PLAN NOTE
Hx of frequent UTIs.  Current s/s personality changes/angry, uncooperative and foul smelling urine.  Has suprapubic cath.

## 2025-03-10 NOTE — PATIENT INSTRUCTIONS
Instructions:  - Continue all medications, treatments and therapies as ordered.  - Follow all instructions, recommendations as discussed.  - Maintain Safety Precautions at all times.  - Attend all medical appointments as scheduled.  - For worsening symptoms: call Primary Care Physician or Nurse Practitioner.  - For emergencies, call 911 or immediately report to the nearest emergency room.

## 2025-03-10 NOTE — ASSESSMENT & PLAN NOTE
Chronic, S/p CVA.  ADL dependent on wife. Non-ambulatory.  Has chronic sacral/coccyx area pressure ulcer for years-managed by [facility] and Wound  NP home visits.  Continue offloading, ensure adequate protein intake.  Followed by PCP.

## 2025-03-10 NOTE — ASSESSMENT & PLAN NOTE
Chronic, related to CVA, neurogenic bladder.  Has chronic indwelling catheter via supra pubic site.  Followed by HH for mike care and Urology.  Hx of UTIs-hospitalization for UTI,sepsis.   Monitor.  Educated on s/s UTI, urine appearance and when to report, adequate oral hydration.   Continue HH.

## 2025-03-10 NOTE — ASSESSMENT & PLAN NOTE
Negative effects of tobacco on body systems was discussed with patient in detail. Patient was recommended to stop smoking. Counseled for 3 minutes. Nicotine replacement options were discussed and not prescribed per patient's request.

## 2025-03-12 ENCOUNTER — DOCUMENT SCAN (OUTPATIENT)
Dept: HOME HEALTH SERVICES | Facility: HOSPITAL | Age: 60
End: 2025-03-12
Payer: MEDICARE

## 2025-03-13 ENCOUNTER — TELEPHONE (OUTPATIENT)
Dept: HOME HEALTH SERVICES | Facility: CLINIC | Age: 60
End: 2025-03-13
Payer: MEDICARE

## 2025-03-13 DIAGNOSIS — F41.9 ANXIETY: ICD-10-CM

## 2025-03-13 NOTE — TELEPHONE ENCOUNTER
3/13/25-received message from patient's spouse to call APS, Angelia Wong, regarding patient's ability to make decisions for himself such as going to live in a nursing home. Relayed to Mrs. Wong that I do not have enough information to determine that at this time but I did place a Psychiatry evaluation after last home visit on 3/7/25. Appointment is pending.

## 2025-03-14 RX ORDER — ALPRAZOLAM 0.5 MG/1
0.5 TABLET ORAL 2 TIMES DAILY PRN
Qty: 30 TABLET | Refills: 0 | Status: SHIPPED | OUTPATIENT
Start: 2025-03-14 | End: 2025-04-13

## 2025-03-18 ENCOUNTER — TELEPHONE (OUTPATIENT)
Dept: HOME HEALTH SERVICES | Facility: CLINIC | Age: 60
End: 2025-03-18
Payer: MEDICARE

## 2025-03-18 DIAGNOSIS — T83.511A URINARY TRACT INFECTION ASSOCIATED WITH INDWELLING URETHRAL CATHETER, INITIAL ENCOUNTER: ICD-10-CM

## 2025-03-18 DIAGNOSIS — Z87.440 HISTORY OF UTI: ICD-10-CM

## 2025-03-18 DIAGNOSIS — N39.0 URINARY TRACT INFECTION ASSOCIATED WITH INDWELLING URETHRAL CATHETER, INITIAL ENCOUNTER: ICD-10-CM

## 2025-03-18 DIAGNOSIS — R33.9 URINARY RETENTION: Primary | ICD-10-CM

## 2025-03-18 NOTE — TELEPHONE ENCOUNTER
Orders placed per NP request for Urinalysis with reflex culture and sensitivity collection by HH.  Demographics, notes, and orders faxed to [facility].  Fax confirmation received.

## 2025-03-19 ENCOUNTER — PATIENT MESSAGE (OUTPATIENT)
Dept: PSYCHIATRY | Facility: CLINIC | Age: 60
End: 2025-03-19
Payer: MEDICARE

## 2025-03-28 ENCOUNTER — CARE AT HOME (OUTPATIENT)
Dept: HOME HEALTH SERVICES | Facility: CLINIC | Age: 60
End: 2025-03-28
Payer: MEDICARE

## 2025-03-28 DIAGNOSIS — R53.81 DEBILITY: ICD-10-CM

## 2025-03-28 DIAGNOSIS — E66.01 MORBID OBESITY: ICD-10-CM

## 2025-03-28 DIAGNOSIS — L89.154 SACRAL DECUBITUS ULCER, STAGE IV: ICD-10-CM

## 2025-03-28 DIAGNOSIS — I69.354 HEMIPARESIS AFFECTING LEFT SIDE AS LATE EFFECT OF CEREBROVASCULAR ACCIDENT: Primary | ICD-10-CM

## 2025-03-28 DIAGNOSIS — Z93.59 SUPRAPUBIC CATHETER: ICD-10-CM

## 2025-03-28 DIAGNOSIS — R33.9 URINARY RETENTION: ICD-10-CM

## 2025-03-28 DIAGNOSIS — Z72.0 VAPES NICOTINE CONTAINING SUBSTANCE: ICD-10-CM

## 2025-03-28 PROCEDURE — 1160F RVW MEDS BY RX/DR IN RCRD: CPT | Mod: CPTII,S$GLB,, | Performed by: NURSE PRACTITIONER

## 2025-03-28 PROCEDURE — 99349 HOME/RES VST EST MOD MDM 40: CPT | Mod: 25,S$GLB,, | Performed by: NURSE PRACTITIONER

## 2025-03-28 PROCEDURE — 99406 BEHAV CHNG SMOKING 3-10 MIN: CPT | Mod: S$GLB,,, | Performed by: NURSE PRACTITIONER

## 2025-03-28 PROCEDURE — 3079F DIAST BP 80-89 MM HG: CPT | Mod: CPTII,S$GLB,, | Performed by: NURSE PRACTITIONER

## 2025-03-28 PROCEDURE — 1159F MED LIST DOCD IN RCRD: CPT | Mod: CPTII,S$GLB,, | Performed by: NURSE PRACTITIONER

## 2025-03-28 PROCEDURE — 3075F SYST BP GE 130 - 139MM HG: CPT | Mod: CPTII,S$GLB,, | Performed by: NURSE PRACTITIONER

## 2025-03-31 VITALS
HEART RATE: 72 BPM | OXYGEN SATURATION: 98 % | TEMPERATURE: 98 F | DIASTOLIC BLOOD PRESSURE: 80 MMHG | SYSTOLIC BLOOD PRESSURE: 138 MMHG | RESPIRATION RATE: 20 BRPM

## 2025-04-01 ENCOUNTER — OFFICE VISIT (OUTPATIENT)
Dept: PSYCHIATRY | Facility: CLINIC | Age: 60
End: 2025-04-01
Payer: MEDICARE

## 2025-04-01 ENCOUNTER — TELEPHONE (OUTPATIENT)
Dept: HOME HEALTH SERVICES | Facility: CLINIC | Age: 60
End: 2025-04-01
Payer: MEDICARE

## 2025-04-01 DIAGNOSIS — R69 DIAGNOSIS DEFERRED: ICD-10-CM

## 2025-04-01 PROCEDURE — 99499 UNLISTED E&M SERVICE: CPT | Mod: 95,,, | Performed by: PSYCHIATRY & NEUROLOGY

## 2025-04-01 NOTE — ASSESSMENT & PLAN NOTE
Unable to stand for weight but last reported weight 121 kg.  Pt non-ambulatory, bed bound/specialty chair bound, unable to perform any exercises due to left hemiparesis.  Morbid obesity compounds patient co-morbidities and complicates treatment course. Counseling given regarding diet modification and exercise recommendations.

## 2025-04-01 NOTE — PROGRESS NOTES
"[facility]  Medical Chronic Care Home Visit    Encounter Provider: Jailene Oviedo   PCP: Tanner Seals MD  Consult Requested By: No ref. provider found    HISTORY OF PRESENT ILLNESS      Patient ID: Nadeem Alegre is a 60 y.o. male is being seen in the home due to physical debility that presents a taxing effort to leave the home, to mitigate high risk of hospital readmission and/or due to the limited availability of reliable or safe options for transportation to the point of access to the provider. Prior to treatment on this visit the chart was reviewed and patient verbal consent was obtained.    Chronic medical conditions synopsis:  Nadeem Alegre is a 60 y.o.male with medical/surgical history including encephalitis, chronic disability, nonambulatory, anxiety, anemia, hypertension, hyperlipidemia, Hemiplegia and hemiparesis following cerebral infarction affecting left non-dominant side, chronic pressure ulcer of sacral region, stage 4, Atherosclerotic heart disease of native coronary artery with other forms of angina pectoris, mixed conductive and sensorineural hearing loss, neuromuscular dysfunction of bladder-chronic indwelling catheter via supra pubic site, neurogenic bowel,personal history of urinary (tract)infections, pseudobulbar affect, insomnia, obstructive sleep apnea.      [facility]    Per spouse, patient is in need of physical today and paperwork for respite care filled out. Verbal orders for CBC/CMP given to HH RN today, as required for Respite Care. Wife Mely states Respite dates will be 4/15/25-4/30/25. Patient is eager to go for respite care, he enjoys going.    Nadeem is found at home today, spouse, Mely is present as well as [facility] nurse Miley. Patient is lying in his hospital bed, AAO x 2, slow to respond, states "I want to go to the nursing home for a little while". Mely reports he has been "acting up" again, mainly with her but will " "occasionally lash out verbally to his male sitter that comes daily for ADL care. After he lashes out he is remorseful and tearful wife state.    Wife feels he is showing signs of UTI with his behavior changes again but no other symptoms. He was treated for UTI last 3/8/25, spouse wanted another "round of antibiotics" following 3/8/25 course, however 3/20/25 urine culture revealed:  Multiple organisms isolated. None in predominance.  Repeat if clinically necessary    He currently has no fever, N/V, abdominal pain, hematuria.He has a history of UTIs and has chronic indwelling suprapubic mike. [facility] and it is recommended that he return for follow up due to frequent UTIs, chronic supra pubic catheter.     With visit today patient is AAO x 2, flat affect, interacts some but it is minimal. He has a vape pen on his chest that he acknowledges is nicotine based and uses it often daily. Mood is stable compared to previous visits. Patient has new patient Psychiatry appt Tuesday 4/1/25 but vehicle is not working to get there. Expressed to spouse to not cancel visit as patient needs this visit due to recent behavioral outbursts.     Patient is followed by [facility] RN for mike care and wound care to chronic coccyx pressure injury that "does not heal"-has been open for at least last 8 years per wife. Currently, chronic sacral stage IV, followed by Wound  NP weekly and [facility] nursing wound care twice weekly-santyl, sliver alginate current plan of care.       No distress noted. [facility] NP will follow patient every 8-12 weeks and prn due to difficulty leaving home. Program contact information provided to patient and left in home.         DECISION MAKING TODAY       Assessment & Plan:  1. Hemiparesis affecting left side as late effect of cerebrovascular accident [I69.354]  Assessment & Plan:  Chronic, S/p CVA.  ADL dependent on wife. " Non-ambulatory.  Has chronic sacral/coccyx area pressure ulcer for years-managed by [facility] and Wound  NP home visits.  Continue offloading, ensure adequate protein intake.  Followed by PCP.      2. Suprapubic catheter  Overview:  Suprapubic     Assessment & Plan:  Chronic, related to CVA, neurogenic bladder.  Has chronic indwelling catheter via supra pubic site.  Followed by  for [facility]ike care and [facility] in [facility], needs follow up-wife instructed to make appt.  Hx of UTIs-hospitalization for UTI,sepsis.   Monitor.  Educated on s/s UTI, urine appearance and when to report, adequate oral hydration.   Continue HH.      3. Urinary retention  Assessment & Plan:  Chronic, related to CVA.  Has chronic indwelling cath via supra pubic site.  Followed by HH for mike care.  Hx of UTIs-recent Ecoli UTI, treatment with oral antibiotics.  Followed by Urology.   Continue HH for mike care.      4. Morbid obesity  Assessment & Plan:  Unable to stand for weight but last reported weight 121 kg.  Pt non-ambulatory, bed bound/specialty chair bound, unable to perform any exercises due to left hemiparesis.  Morbid obesity compounds patient co-morbidities and complicates treatment course. Counseling given regarding diet modification and exercise recommendations.      5. Sacral decubitus ulcer, stage IV  Assessment & Plan:  Chronic, slow healing wound.  Followed by Wound  NP weekly in home and Home Health RN twice weekly. Wife provides care on other day. Currently using santyl/silver alginate.  Educated on importance of offloading, s/s wound deterioration.  Monitor.       6. Vapes nicotine containing substance [Z72.0]  Assessment & Plan:  Negative effects of tobacco on body systems was discussed with patient in detail. Patient was recommended to stop smoking. Counseled for 3 minutes. Nicotine replacement options were discussed and not prescribed per patient's request.       7.  Debility  Assessment & Plan:  Chronic, ADL dependent, due to CVA following ear infection, s/p mastoidectomy, CVA per wife at age 49.   Wife provides care.  Maximal DME use daily.             ENVIRONMENT OF CARE      Family and/or Caregiver present at visit?  Yes  Name of Caregiver: spouse, Mely  History provided by: caregiver    4Ms for Medical Decision-Making in Older Adults    Last Completed EAWV:  None    MEDICATIONS:  High Risk Medications:  Total Active Medications: 2  ALPRAZolam - 0.5 MG  cyclobenzaprine - 10 MG    MOBILITY:  Activities of Daily Living:       No data to display              Fall Risk:      3/28/2025     8:00 AM 3/7/2025     8:00 AM 9/27/2024     8:00 AM   Fall Risk Assessment - Outpatient   Mobility Status Wheelchair Bound Stretcher Wheelchair Bound   Number of falls 0 0 0   Identified as fall risk  True True     Disability Status:      4/13/2017     1:17 AM   Disability Status   Are you deaf or do you have serious difficulty hearing? N   Are you blind or do you have serious difficulty seeing, even when wearing glasses? N   Because of a physical, mental, or emotional condition, do you have serious difficulty concentrating, remembering, or making decisions? N   Do you have serious difficulty walking or climbing stairs? Y   Do you have difficulty dressing or bathing? Y   Because of a physical, mental, or emotional condition, do you have difficulty doing errands alone such as visiting a doctor's office or shopping? Y     Nutrition Screening:       No data to display             Screening Score: 0-7 Malnourished, 8-11 At Risk, 12-14 Normal  Get Up and Go:       No data to display              Whisper Test:       No data to display                    MENTATION:   Has Dementia Dx: No  Has Anxiety Dx: No    Depression Patient Health Questionnaire:      3/16/2023     1:57 PM   Depression Patient Health Questionnaire   Over the last two weeks how often have you been bothered by little interest or  "pleasure in doing things Not at all    Over the last two weeks how often have you been bothered by feeling down, depressed or hopeless Not at all   PHQ-2 Total Score 0       Data saved with a previous flowsheet row definition     Cognitive Function Screening:       No data to display              Cognitive Function Screening Total - Less than 4 = Abnormal,  Greater than or equal to 4 = Normal        WHAT MATTERS MOST:  Advance Care Planning   ACP Status:   Patient has had an ACP conversation  Living Will: No  Power of : No  [place]: No    What is most important right now is to focus on spending time at homeavoiding the hospitalremaining as independent as possiblesymptom/pain controlcomfort and QOL     Accordingly, we have decided that the best plan to meet the patient's goals includes continuing with treatment      What matters most to patient today is: "to go to the Nursing Home for a little while"           Is patient hospice appropriate: No  (If needed, use PPS <30 or FAST score >7)  Was referral to hospice placed: No    Impression upon entering the home:  Physical Dwelling: single family home   Appearance of home environment: cleaniness: clean, walking pathways: clear, lighting: adequate, and home structure: sound structure  Functional Status: max assistance  Mobility: chair bound  Nutritional access: adequate intake and access  Home Health: Yes, HH Agency [facility]    DME/Supplies: hospital bed, wound care supplies, power scooter, and mike supplies       Diagnostic tests reviewed/disposition: No diagnosic tests pending after this hospitalization.  Disease/illness education:  mood disorder, UTI s/s, chronic mike  Establishment or re-establishment of referral orders for community resources: No other necessary community resources.   Discussion with other health care providers: No discussion with other health care providers necessary.   Does patient have a PCP at [facility]? Yes   Repatriation plan with PCP? Care at " Home reason: mobility   Does patient have an ostomy (ileostomy, colostomy, suprapubic catheter, nephrostomy tube, tracheostomy, PEG tube, pleurex catheter, cholecystostomy, etc)? Yes. Is it on problem list?yes  Were BPAs reviewed? Yes    Social History     Socioeconomic History    Marital status:    Occupational History     Employer: [employer]   Tobacco Use    Smoking status: Every Day     Current packs/day: 0.00     Average packs/day: 0.5 packs/day for 18.0 years (9.0 ttl pk-yrs)     Types: Vaping with nicotine, Cigarettes     Start date: 10/28/1996     Last attempt to quit: 10/28/2014     Years since quitting: 10.4    Smokeless tobacco: Never   Substance and Sexual Activity    Alcohol use: Yes     Alcohol/week: 5.0 standard drinks of alcohol     Types: 6 Standard drinks or equivalent per week     Comment: occ.    Drug use: Never     Social Drivers of Health     Financial Resource Strain: Low Risk  (4/1/2025)    Overall Financial Resource Strain (CARDIA)     Difficulty of Paying Living Expenses: Not very hard   Food Insecurity: No Food Insecurity (4/1/2025)    Hunger Vital Sign     Worried About Running Out of Food in the Last Year: Never true     Ran Out of Food in the Last Year: Never true   Transportation Needs: Unmet Transportation Needs (4/1/2025)    PRAPARE - Transportation     Lack of Transportation (Medical): Yes     Lack of Transportation (Non-Medical): Yes   Physical Activity: Inactive (4/1/2025)    Exercise Vital Sign     Days of Exercise per Week: 0 days     Minutes of Exercise per Session: 0 min   Stress: Stress Concern Present (4/1/2025)    [employer] - Occupational Stress Questionnaire     Feeling of Stress : Very much   Housing Stability: Low Risk  (4/1/2025)    Housing Stability Vital Sign     Unable to Pay for Housing in the Last Year: No     Number of Times Moved in the Last Year: 0     Homeless in the Last Year: No         OBJECTIVE:     Vital Signs:  Vitals:     03/28/25 1100   BP: 138/80   Pulse: 72   Resp: 20   Temp: 98.4 °F (36.9 °C)     Review of Systems   Constitutional:  Negative.  HENT: Negative.     Eyes: Negative.    Respiratory: Negative.     Cardiovascular: Negative.    Gastrointestinal: Negative.    Endocrine: Negative.    Genitourinary:         Has suprapubic mike cath-chronic   Musculoskeletal:  Positive for back pain.         Physical Exam:  Physical Exam  Constitutional:       General: He is not in acute distress.     Appearance: He is obese. He is not ill-appearing.      Comments: Minimally interactive   HENT:      Head: Normocephalic and atraumatic.      Right Ear: External ear normal.      Left Ear: External ear normal.      Nose: Nose normal.      Mouth/Throat:      Mouth: Mucous membranes are dry.      Pharynx: Oropharynx is clear.   Eyes:      Extraocular Movements: Extraocular movements intact.      Conjunctiva/sclera: Conjunctivae normal.      Pupils: Pupils are equal, round, and reactive to light.   Cardiovascular:      Rate and Rhythm: Normal rate and regular rhythm.      Pulses: Normal pulses.      Heart sounds: Normal heart sounds.   Pulmonary:      Effort: Pulmonary effort is normal.      Breath sounds: Normal breath sounds.   Abdominal:      General: Bowel sounds are normal.      Palpations: Abdomen is soft.   Genitourinary:     Comments: 22 Fr mike in place via suprapubic site, WNL. Urine appears pale, yellow with small amount of sediment noted.  Musculoskeletal:      Cervical back: Neck supple.      Comments: Generalized atrophy   Skin:     General: Skin is warm and dry.      Capillary Refill: Capillary refill takes 2 to 3 seconds.      Comments: Chronic coccyx pressure injury (present for years). No s/s infection but non-healing.   Neurological:      Mental Status: He is alert.      Motor: Weakness present.      Comments: Oriented to person and place, forgetful  Bed bound mostly gets into specialty chair    Psychiatric:      Comments:  Flat affect, minimally interactive            INSTRUCTIONS FOR PATIENT:     Scheduled Follow-up, Appts Reviewed with Modifications if Needed: Yes  Future Appointments   Date Time Provider Department Center   5/1/2025  8:00 AM Jailene Oviedo NP [address]       Current Medications[1]    Medication Reconciliation:  Were medications changed during this appointment? No  Were medications in the home? Yes  Is the patient taking the medications as directed? Yes  Does the patient/caregiver understand the medications and changes? Yes  Does updated med list accurately reflects meds patient is currently taking? Yes      I spent a total of 38 minutes on the day of the visit.This includes face to face time and non-face to face time preparing to see the patient (eg, review of tests), obtaining and/or reviewing separately obtained history, documenting clinical information in the electronic or other health record, independently interpreting results and communicating results to the patient/family/caregiver, or care coordinator.    Respite care documentation completed post visit, found in patient's media tab.      Signature: Jailene Oviedo NP         [1]   Current Outpatient Medications:     ALPRAZolam (XANAX) 0.5 MG tablet, Take 1 tablet (0.5 mg total) by mouth 2 (two) times daily as needed for Anxiety., Disp: 30 tablet, Rfl: 0    ascorbic Acid (VITAMIN C) 500 mg CpSR, Vitamin C 500 mg capsule,extended release  Take 1 capsule twice a day by oral route., Disp: , Rfl:     aspirin (ECOTRIN) 81 MG EC tablet, Take 1 tablet by mouth once daily., Disp: , Rfl:     bisacodyL (DULCOLAX) 10 mg Supp, Place rectally., Disp: , Rfl:     CRANBERRY FRUIT EXTRACT (CRANBERRY CONCENTRATE ORAL), Take 1 oz by mouth every evening., Disp: , Rfl:     cyclobenzaprine (FLEXERIL) 10 MG tablet, Take 10 mg by mouth 3 (three) times daily as needed for Muscle spasms., Disp: , Rfl:     docusate sodium (COLACE) 100 MG capsule, Take 200 mg by mouth 2  (two) times daily. , Disp: , Rfl:     ergocalciferol (ERGOCALCIFEROL) 50,000 unit Cap, Take 1 capsule (50,000 Units total) by mouth every 7 days., Disp: 12 capsule, Rfl: 0    FENOFIBRATE MICRONIZED ORAL, Take 48 mg by mouth once daily., Disp: , Rfl:     fish oil-omega-3 fatty acids 300-1,000 mg capsule, Take 2 g by mouth 2 (two) times daily. , Disp: , Rfl:     ibuprofen (ADVIL,MOTRIN) 800 MG tablet, Take 1 tablet (800 mg total) by mouth every 8 (eight) hours as needed for Pain., Disp: 90 tablet, Rfl: 5    Lactobacillus rhamnosus GG (CULTURELLE) 10 billion cell capsule, Take 1 capsule by mouth once daily., Disp: 14 capsule, Rfl: 0    magnesium oxide (MAG-OX) 400 mg tablet, Take 400 mg by mouth 2 (two) times daily. , Disp: , Rfl:     melatonin 5 mg Tab, Take by mouth nightly as needed. , Disp: , Rfl:     modafiniL (PROVIGIL) 200 MG Tab, Take 1 tablet by mouth once daily., Disp: , Rfl:     multivitamin (THERAGRAN) per tablet, Take 1 tablet by mouth once daily., Disp: , Rfl:     omeprazole (PRILOSEC) 20 MG capsule, Take 20 mg by mouth once daily., Disp: , Rfl:     ondansetron (ZOFRAN-ODT) 8 MG TbDL, Take 1 tablet (8 mg total) by mouth every 8 (eight) hours as needed., Disp: 10 tablet, Rfl: 2    pantoprazole (PROTONIX) 40 MG tablet, Take 1 tablet by mouth once daily., Disp: , Rfl:     polyethylene glycol (GLYCOLAX) 17 gram PwPk, Take 17 g by mouth 2 (two) times daily., Disp: 100 each, Rfl: 6    potassium chloride SA (K-DUR,KLOR-CON M) 10 MEQ tablet, Take by mouth 2 (two) times daily., Disp: , Rfl:     senna (SENOKOT) 8.6 mg tablet, Take 2 tablets by mouth 2 (two) times daily. , Disp: , Rfl:     sucralfate (CARAFATE) 1 gram tablet, Take 1 g by mouth 4 (four) times daily., Disp: , Rfl:     vitamin E 100 UNIT capsule, Take 100 Units by mouth once daily., Disp: , Rfl:     zinc gluconate 50 mg tablet, Take 50 mg by mouth once daily., Disp: , Rfl:     albuterol (VENTOLIN HFA) 90 mcg/actuation inhaler, Inhale 2 puffs into the  lungs every 4 (four) hours as needed for Wheezing or Shortness of Breath. Rescue, Disp: 18 g, Rfl: 3    cetirizine (ZYRTEC) 1 mg/mL syrup, Take 10 mLs (10 mg total) by mouth once daily., Disp: 473 mL, Rfl: 0    mupirocin (BACTROBAN) 2 % ointment, SMARTSI Application Topical 2-3 Times Daily (Patient not taking: Reported on 3/31/2025), Disp: , Rfl:     nitrofurantoin, macrocrystal-monohydrate, (MACROBID) 100 MG capsule, Take 1 capsule (100 mg total) by mouth 2 (two) times daily., Disp: 14 capsule, Rfl: 0

## 2025-04-01 NOTE — TELEPHONE ENCOUNTER
Faxed orders and labs to [facility] Betancourt per NP request.  Fax confirmation received.

## 2025-04-01 NOTE — ASSESSMENT & PLAN NOTE
Chronic, related to CVA, neurogenic bladder.  Has chronic indwelling catheter via supra pubic site.  Followed by [facility] for mike care [facility], needs follow up-wife instructed to make appt.  Hx of UTIs-hospitalization for UTI,sepsis.   Monitor.  Educated on s/s UTI, urine appearance and when to report, adequate oral hydration.   Continue HH.

## 2025-04-03 ENCOUNTER — EXTERNAL HOME HEALTH (OUTPATIENT)
Dept: HOME HEALTH SERVICES | Facility: HOSPITAL | Age: 60
End: 2025-04-03
Payer: MEDICARE

## 2025-04-08 ENCOUNTER — DOCUMENT SCAN (OUTPATIENT)
Dept: HOME HEALTH SERVICES | Facility: HOSPITAL | Age: 60
End: 2025-04-08
Payer: MEDICARE

## 2025-04-10 PROCEDURE — G0179 MD RECERTIFICATION HHA PT: HCPCS | Mod: ,,, | Performed by: INTERNAL MEDICINE

## 2025-04-18 DIAGNOSIS — F41.9 ANXIETY: ICD-10-CM

## 2025-04-18 RX ORDER — ALPRAZOLAM 0.5 MG/1
0.5 TABLET ORAL 2 TIMES DAILY PRN
Qty: 30 TABLET | Refills: 0 | Status: SHIPPED | OUTPATIENT
Start: 2025-04-18 | End: 2025-05-18

## 2025-05-07 ENCOUNTER — PATIENT MESSAGE (OUTPATIENT)
Dept: FAMILY MEDICINE | Facility: CLINIC | Age: 60
End: 2025-05-07
Payer: MEDICARE

## 2025-05-07 DIAGNOSIS — G81.94 LEFT HEMIPARESIS: Primary | ICD-10-CM

## 2025-05-07 DIAGNOSIS — Z93.59 SUPRAPUBIC CATHETER: ICD-10-CM

## 2025-05-12 ENCOUNTER — PATIENT MESSAGE (OUTPATIENT)
Dept: FAMILY MEDICINE | Facility: CLINIC | Age: 60
End: 2025-05-12
Payer: MEDICARE

## 2025-05-13 ENCOUNTER — PATIENT MESSAGE (OUTPATIENT)
Dept: FAMILY MEDICINE | Facility: CLINIC | Age: 60
End: 2025-05-13
Payer: MEDICARE

## 2025-05-16 ENCOUNTER — CARE AT HOME (OUTPATIENT)
Dept: HOME HEALTH SERVICES | Facility: CLINIC | Age: 60
End: 2025-05-16
Payer: MEDICARE

## 2025-05-16 DIAGNOSIS — R33.9 URINARY RETENTION: ICD-10-CM

## 2025-05-16 DIAGNOSIS — Z93.59 SUPRAPUBIC CATHETER: ICD-10-CM

## 2025-05-16 DIAGNOSIS — I69.354 HEMIPARESIS AFFECTING LEFT SIDE AS LATE EFFECT OF CEREBROVASCULAR ACCIDENT: ICD-10-CM

## 2025-05-16 DIAGNOSIS — Z72.0 VAPES NICOTINE CONTAINING SUBSTANCE: ICD-10-CM

## 2025-05-16 DIAGNOSIS — F32.A ANXIETY AND DEPRESSION: ICD-10-CM

## 2025-05-16 DIAGNOSIS — R53.81 DEBILITY: ICD-10-CM

## 2025-05-16 DIAGNOSIS — Z74.1 REQUIRES ASSISTANCE WITH ACTIVITIES OF DAILY LIVING (ADL): ICD-10-CM

## 2025-05-16 DIAGNOSIS — F41.9 ANXIETY AND DEPRESSION: ICD-10-CM

## 2025-05-16 DIAGNOSIS — L89.154 SACRAL DECUBITUS ULCER, STAGE IV: Primary | ICD-10-CM

## 2025-05-16 DIAGNOSIS — F48.2 PSEUDOBULBAR AFFECT: ICD-10-CM

## 2025-05-16 PROCEDURE — 99350 HOME/RES VST EST HIGH MDM 60: CPT | Mod: 25,S$GLB,, | Performed by: NURSE PRACTITIONER

## 2025-05-16 PROCEDURE — 3079F DIAST BP 80-89 MM HG: CPT | Mod: CPTII,S$GLB,, | Performed by: NURSE PRACTITIONER

## 2025-05-16 PROCEDURE — 3077F SYST BP >= 140 MM HG: CPT | Mod: CPTII,S$GLB,, | Performed by: NURSE PRACTITIONER

## 2025-05-16 PROCEDURE — 1160F RVW MEDS BY RX/DR IN RCRD: CPT | Mod: CPTII,S$GLB,, | Performed by: NURSE PRACTITIONER

## 2025-05-16 PROCEDURE — 1159F MED LIST DOCD IN RCRD: CPT | Mod: CPTII,S$GLB,, | Performed by: NURSE PRACTITIONER

## 2025-05-16 PROCEDURE — 99406 BEHAV CHNG SMOKING 3-10 MIN: CPT | Mod: S$GLB,,, | Performed by: NURSE PRACTITIONER

## 2025-05-16 RX ORDER — ESCITALOPRAM OXALATE 5 MG/1
5 TABLET ORAL DAILY
Qty: 90 TABLET | Refills: 0 | Status: SHIPPED | OUTPATIENT
Start: 2025-05-16 | End: 2026-05-16

## 2025-05-20 VITALS
OXYGEN SATURATION: 97 % | TEMPERATURE: 98 F | HEART RATE: 79 BPM | RESPIRATION RATE: 20 BRPM | DIASTOLIC BLOOD PRESSURE: 80 MMHG | SYSTOLIC BLOOD PRESSURE: 140 MMHG

## 2025-05-20 PROBLEM — S82.201A FRACTURE OF RIGHT TIBIA AND FIBULA: Status: RESOLVED | Noted: 2020-03-19 | Resolved: 2025-05-20

## 2025-05-20 PROBLEM — S82.401A FRACTURE OF RIGHT TIBIA AND FIBULA: Status: RESOLVED | Noted: 2020-03-19 | Resolved: 2025-05-20

## 2025-05-20 NOTE — PATIENT INSTRUCTIONS
Instructions  - Continue all medications, treatments and therapies as ordered.   - Follow all instructions, recommendations as discussed.  - Maintain Safety Precautions at all times.  - Attend all medical appointments as scheduled.  - For worsening symptoms: call Primary Care Physician or Nurse Practitioner.  - For emergencies, call 911 or immediately report to the nearest emergency room.

## 2025-05-20 NOTE — PROGRESS NOTES
[facility]  Medical Chronic Care Home Visit    Encounter Provider: Jailene Oviedo   PCP: Tanner Seals MD  Consult Requested By: No ref. provider found    HISTORY OF PRESENT ILLNESS      Patient ID: Nadeem Alegre is a 60 y.o. male is being seen in the home due to physical debility that presents a taxing effort to leave the home, to mitigate high risk of hospital readmission and/or due to the limited availability of reliable or safe options for transportation to the point of access to the provider. Prior to treatment on this visit the chart was reviewed and patient verbal consent was obtained.    Chronic medical conditions synopsis:  Nadeem Alegre is a 60 y.o.male with medical/surgical history including encephalitis, chronic disability, nonambulatory, anxiety, anemia, hypertension, hyperlipidemia, Hemiplegia and hemiparesis following cerebral infarction affecting left non-dominant side, chronic pressure ulcer of sacral region, stage 4, Atherosclerotic heart disease of native coronary artery with other forms of angina pectoris, mixed conductive and sensorineural hearing loss, neuromuscular dysfunction of bladder-chronic indwelling catheter via supra pubic site, neurogenic bowel,personal history of urinary (tract)infections, pseudobulbar affect, insomnia, obstructive sleep apnea.      [facility] Singleton     Patient receives [facility] twice a [facility]. Recently home from respite care (4/23/25-5/7/25) at [facility].    Patient with obvious Pseudubulbar affect, gets very emotional/cries at inappropriate times. Wife states he has been on medication previously, [facility] but medication was too expensive.   On no antidepressants currently and patient states he has anxiety and depression. Has been on lexapro in the past and will restart lexapro today.  Wife to reschedule Psych appt, had to cancel in last month due to transportation issues.  Patient does not answer all questions appropriately, unable to perform PHQ9 today.     Chronic Stage 4 sacral pressure injury-Wound  NP for home visits to resume, needs orders to evaluate.    No distress noted. [facility] NP will follow patient every 8-12 weeks and prn due to difficulty leaving home. Program contact information provided to patient and left in home.     DECISION MAKING TODAY       Assessment & Plan:  1. Sacral decubitus ulcer, stage IV  Assessment & Plan:  Chronic, slow healing wound, present for years.  Followed by Wound  NP weekly in home prior to recent respite care stay and Home Health RN twice weekly. Wife provides care on other day. Currently using santyl/silver alginate.  Educated on importance of offloading, s/s wound deterioration.  Monitor.     Orders:  -     Ambulatory referral/consult to Wound Clinic; Future; Expected date: 05/27/2025    2. Suprapubic catheter  Overview:  Suprapubic     Assessment & Plan:  Chronic, related to CVA, neurogenic bladder.  Has chronic indwelling catheter via supra pubic site.  Followed by  for mike care [facility], needs follow up-wife instructed to make appt.  Hx of UTIs-hospitalization for UTI,sepsis.   Monitor.  Educated on s/s UTI, urine appearance and when to report, adequate oral hydration.   Continue HH.      3. Urinary retention  Assessment & Plan:  Chronic, related to CVA.  Has chronic indwelling cath via supra pubic site.  Followed by HH for mike care.  Hx of UTIs-recent Ecoli UTI, treatment with oral antibiotics.  Followed by Urology.   Continue HH for mike care.      4. Hemiparesis affecting left side as late effect of cerebrovascular accident  Assessment & Plan:  Chronic, S/p CVA. Spends most of his time in hospital bed but does have specialty wheelchair with use of sheila lift can get into chair every other day per wife.  ADL dependent on wife. Non-ambulatory.  Has chronic  "sacral/coccyx area pressure ulcer for years-managed by [facility] and Wound  NP home visits.  Continue offloading, ensure adequate protein intake.  Followed by PCP.      5. Vapes nicotine containing substance [Z72.0]  Assessment & Plan:  Negative effects of tobacco on body systems was discussed with patient in detail. Patient was recommended to stop smoking. Counseled for 3 minutes. Nicotine replacement options were discussed and not prescribed per patient's request.       6. Debility  Assessment & Plan:  Chronic, ADL dependent, due to CVA following ear infection, s/p mastoidectomy, CVA per wife at age 49.   Wife provides care.  Maximal DME use daily.    Orders:  -     Ambulatory referral/consult to Wound Clinic; Future; Expected date: 05/27/2025    7. Requires assistance with activities of daily living (ADL)  Assessment & Plan:  Chronic, ADL dependent, due to CVA following ear infection, s/p mastoidectomy, CVA per wife at age 49.   Wife provides care.  Maximal DME use daily.          8. Pseudobulbar affect  Assessment & Plan:  Chronic issue following CVA age 49.  [facility] and put on medication but it was very expensive per wife.  Patient cries at inappropriate times, becomes very emotional often per wife.  Educated wife on importance of follow up for this dx.  Will start low dose lexapro-has been on this in the past.       9. Anxiety and depression  Assessment & Plan:  Chronic, hx CVA age 49, non-ambulatory, ADL dependent, pseudobulbar affect.  Patient unable to participate in PHQ9 today, does verbalize anxiety and depression, wife agrees.  Follow[facility] but "it has been awhile".  Educated wife to make follow up appt ASAP.  Will begin Lexapro (has been on it in the past with good results reported, unsure why he was taken off of it).  Monitor.    Orders:  -     EScitalopram oxalate (LEXAPRO) 5 MG Tab; Take 1 tablet (5 mg total) by " mouth once daily.  Dispense: 90 tablet; Refill: 0           ENVIRONMENT OF CARE      Family and/or Caregiver present at visit?  Yes  Name of Caregiver: spouse, Mely  History provided by: caregiver    4Ms for Medical Decision-Making in Older Adults    Last Completed EAWV:  None    MEDICATIONS:  High Risk Medications:  Total Active Medications: 2  cyclobenzaprine - 10 MG  EScitalopram oxalate Tab - 5 MG    MOBILITY:  Activities of Daily Living:       No data to display              Fall Risk:      5/16/2025     8:00 AM 3/28/2025     8:00 AM 3/7/2025     8:00 AM   Fall Risk Assessment - Outpatient   Mobility Status Stretcher Wheelchair Bound Stretcher   Number of falls 0 0 0   Identified as fall risk True  True     Disability Status:      4/13/2017     1:17 AM   Disability Status   Are you deaf or do you have serious difficulty hearing? N   Are you blind or do you have serious difficulty seeing, even when wearing glasses? N   Because of a physical, mental, or emotional condition, do you have serious difficulty concentrating, remembering, or making decisions? N   Do you have serious difficulty walking or climbing stairs? Y   Do you have difficulty dressing or bathing? Y   Because of a physical, mental, or emotional condition, do you have difficulty doing errands alone such as visiting a doctor's office or shopping? Y     Nutrition Screening:       No data to display             Screening Score: 0-7 Malnourished, 8-11 At Risk, 12-14 Normal  Get Up and Go:       No data to display              Whisper Test:       No data to display                    MENTATION:   Has Dementia Dx: No  Has Anxiety Dx: Yes    Depression Patient Health Questionnaire:      3/16/2023     1:57 PM   Depression Patient Health Questionnaire   Over the last two weeks how often have you been bothered by little interest or pleasure in doing things Not at all    Over the last two weeks how often have you been bothered by feeling down, depressed or  hopeless Not at all   PHQ-2 Total Score 0       Data saved with a previous flowsheet row definition     Cognitive Function Screening:       No data to display              Cognitive Function Screening Total - Less than 4 = Abnormal,  Greater than or equal to 4 = Normal        WHAT MATTERS MOST:  Advance Care Planning   ACP Status:   Patient has had an ACP conversation  Living Will: No  Power of : No  La[place]: No    What is most important right now is to focus on spending time at homeavoiding the hospitalremaining as independent as possiblesymptom/pain controlcomfort and QOL     Accordingly, we have decided that the best plan to meet the patient's goals includes continuing with treatment      What matters most to patient today is: pt not able to verbalize what matters most today           Is patient hospice appropriate: No  (If needed, use PPS <30 or FAST score >7)  Was referral to hospice placed: No    Impression upon entering the home:  Physical Dwelling: single family home   Appearance of home environment: cleaniness: clean, walking pathways: clear, and home structure: sound structure  Functional Status: max assistance  Mobility: bedbound  Nutritional access: adequate intake and access  Home Health: Yes, [facility]   DME/Supplies: hospital bed and wound care supplies, sheila lift, specialty chair     Diagnostic tests reviewed/disposition: No diagnosic tests pending after this hospitalization.  Disease/illness education: depression, debility, pressure injury  Establishment or re-establishment of referral orders for community resources: No other necessary community resources.   Discussion with other health care providers: Miley CHAMPION RN, present today during visit, discussed plan of care.   Does patient have a PCP at [facility]? Yes   Repatriation plan with PCP? [facility] reason: mobility   Does patient have an ostomy (ileostomy, colostomy, suprapubic catheter, nephrostomy tube, tracheostomy, PEG tube,  pleurex catheter, cholecystostomy, etc)? Yes. Is it on problem list?yes  Were BPAs reviewed? Yes    Social History     Socioeconomic History    Marital status:    Occupational History     Employer: [employer]   Tobacco Use    Smoking status: Every Day     Types: Vaping with nicotine    Smokeless tobacco: Never   Substance and Sexual Activity    Alcohol use: Yes     Alcohol/week: 5.0 standard drinks of alcohol     Types: 6 Standard drinks or equivalent per week     Comment: occ.    Drug use: Never     Social Drivers of Health     Financial Resource Strain: Patient Declined (4/18/2025)    Received from [facility]    Overall Financial Resource Strain (CARDIA)     Difficulty of Paying Living Expenses: Patient declined   Food Insecurity: No Food Insecurity (4/18/2025)    Received from [facility]    Hunger Vital Sign     Worried About Running Out of Food in the Last Year: Never true     Ran Out of Food in the Last Year: Never true   Transportation Needs: Unmet Transportation Needs (4/18/2025)    Received from [facility]    PRAPARE - Transportation     Lack of Transportation (Medical): Yes     Lack of Transportation (Non-Medical): No   Physical Activity: Unknown (4/18/2025)    Received from [facility]    Exercise Vital Sign     Days of Exercise per Week: 0 days   Recent Concern: Physical Activity - Inactive (4/1/2025)    Exercise Vital Sign     Days of Exercise per Week: 0 days     Minutes of Exercise per Session: 0 min   Stress: Stress Concern Present (4/18/2025)    Received from [facility]    [employer] - Occupational Stress Questionnaire     Feeling of Stress : Very much   Housing Stability: Low Risk  (4/18/2025)    Received from [facility]    Housing Stability Vital Sign     Unable to Pay for Housing in the Last Year: No     Number of Times Moved in the Last Year: 1     Homeless in the Last Year: No         OBJECTIVE:     Vital Signs:  Vitals:    05/16/25 1050   BP: (!) 140/80    Pulse: 79   Resp: 20   Temp: 97.8 °F (36.6 °C)     Review of Systems-wife assists  Constitutional:  Negative.  HENT: Negative.     Eyes: Negative.    Respiratory: Negative.     Cardiovascular: Negative.    Gastrointestinal: Negative.    Endocrine: Negative.    Genitourinary:         Has suprapubic mike cath-chronic   Musculoskeletal:  Positive for back pain.         Physical Exam:  Physical Exam  Constitutional:       General: He is not in acute distress.     Appearance: He is obese. He is not ill-appearing.      Comments: Minimally interactive   HENT:      Head: Normocephalic and atraumatic.      Right Ear: External ear normal.      Left Ear: External ear normal.      Nose: Nose normal.      Mouth/Throat:      Mouth: Mucous membranes are dry.      Pharynx: Oropharynx is clear.   Eyes:      Extraocular Movements: Extraocular movements intact.      Conjunctiva/sclera: Conjunctivae normal.      Pupils: Pupils are equal, round, and reactive to light.   Cardiovascular:      Rate and Rhythm: Normal rate and regular rhythm.      Pulses: Normal pulses.      Heart sounds: Normal heart sounds.   Pulmonary:      Effort: Pulmonary effort is normal.      Breath sounds: Normal breath sounds.   Abdominal:      General: Bowel sounds are normal.      Palpations: Abdomen is soft.   Genitourinary:     Comments: 22 Fr mike in place via suprapubic site, WNL. Urine appears pale, yellow with small amount of sediment noted.  Musculoskeletal:      Cervical back: Neck supple.      Comments: Generalized atrophy   Skin:     General: Skin is warm and dry.      Capillary Refill: Capillary refill takes 2 to 3 seconds.      Comments: Chronic coccyx pressure injury (present for years). No s/s infection but non-healing.   Neurological:      Mental Status: He is alert.      Motor: Weakness present.      Comments: Oriented to person and place, forgetful  Bed bound mostly gets into specialty chair    Psychiatric:      Comments: Flat affect,  tearful/emotional spontaneously           INSTRUCTIONS FOR PATIENT:     Scheduled Follow-up, Appts Reviewed with Modifications if Needed: Yes  No future appointments.    Current Medications[1]    Medication Reconciliation:  Were medications changed during this appointment? Yes  Were medications in the home? No, spouse to  from pharmacy later today  Is the patient taking the medications as directed? Yes  Does the patient/caregiver understand the medications and changes? Yes  Does updated med list accurately reflects meds patient is currently taking? Yes    I spent a total of 55 minutes on the day of the visit.This includes face to face time and non-face to face time preparing to see the patient (eg, review of tests), obtaining and/or reviewing separately obtained history, documenting clinical information in the electronic or other health record, independently interpreting results and communicating results to the patient/family/caregiver, or care coordinator.        Signature: Jailene Oviedo NP         [1]   Current Outpatient Medications:     ascorbic Acid (VITAMIN C) 500 mg CpSR, Vitamin C 500 mg capsule,extended release  Take 1 capsule twice a day by oral route., Disp: , Rfl:     aspirin (ECOTRIN) 81 MG EC tablet, Take 1 tablet by mouth once daily., Disp: , Rfl:     bisacodyL (DULCOLAX) 10 mg Supp, Place rectally., Disp: , Rfl:     CRANBERRY FRUIT EXTRACT (CRANBERRY CONCENTRATE ORAL), Take 1 oz by mouth every evening., Disp: , Rfl:     cyclobenzaprine (FLEXERIL) 10 MG tablet, Take 10 mg by mouth 3 (three) times daily as needed for Muscle spasms., Disp: , Rfl:     docusate sodium (COLACE) 100 MG capsule, Take 200 mg by mouth 2 (two) times daily. , Disp: , Rfl:     ergocalciferol (ERGOCALCIFEROL) 50,000 unit Cap, Take 1 capsule (50,000 Units total) by mouth every 7 days., Disp: 12 capsule, Rfl: 0    fish oil-omega-3 fatty acids 300-1,000 mg capsule, Take 2 g by mouth 2 (two) times daily. , Disp: , Rfl:      ibuprofen (ADVIL,MOTRIN) 800 MG tablet, Take 1 tablet (800 mg total) by mouth every 8 (eight) hours as needed for Pain., Disp: 90 tablet, Rfl: 5    magnesium oxide (MAG-OX) 400 mg tablet, Take 400 mg by mouth 2 (two) times daily. , Disp: , Rfl:     melatonin 5 mg Tab, Take by mouth nightly as needed. , Disp: , Rfl:     multivitamin (THERAGRAN) per tablet, Take 1 tablet by mouth once daily., Disp: , Rfl:     omeprazole (PRILOSEC) 20 MG capsule, Take 20 mg by mouth once daily., Disp: , Rfl:     ondansetron (ZOFRAN-ODT) 8 MG TbDL, Take 1 tablet (8 mg total) by mouth every 8 (eight) hours as needed., Disp: 10 tablet, Rfl: 2    polyethylene glycol (GLYCOLAX) 17 gram PwPk, Take 17 g by mouth 2 (two) times daily., Disp: 100 each, Rfl: 6    potassium chloride SA (K-DUR,KLOR-CON M) 10 MEQ tablet, Take by mouth 2 (two) times daily., Disp: , Rfl:     senna (SENOKOT) 8.6 mg tablet, Take 2 tablets by mouth 2 (two) times daily. , Disp: , Rfl:     vitamin E 100 UNIT capsule, Take 100 Units by mouth once daily., Disp: , Rfl:     zinc gluconate 50 mg tablet, Take 50 mg by mouth once daily., Disp: , Rfl:     albuterol (VENTOLIN HFA) 90 mcg/actuation inhaler, Inhale 2 puffs into the lungs every 4 (four) hours as needed for Wheezing or Shortness of Breath. Rescue, Disp: 18 g, Rfl: 3    ALPRAZolam (XANAX) 0.5 MG tablet, Take 1 tablet (0.5 mg total) by mouth 2 (two) times daily as needed for Anxiety., Disp: 30 tablet, Rfl: 0    cetirizine (ZYRTEC) 1 mg/mL syrup, Take 10 mLs (10 mg total) by mouth once daily., Disp: 473 mL, Rfl: 0    EScitalopram oxalate (LEXAPRO) 5 MG Tab, Take 1 tablet (5 mg total) by mouth once daily., Disp: 90 tablet, Rfl: 0    FENOFIBRATE MICRONIZED ORAL, Take 48 mg by mouth once daily., Disp: , Rfl:     Lactobacillus rhamnosus GG (CULTURELLE) 10 billion cell capsule, Take 1 capsule by mouth once daily., Disp: 14 capsule, Rfl: 0    modafiniL (PROVIGIL) 200 MG Tab, Take 1 tablet by mouth once daily., Disp: , Rfl:      mupirocin (BACTROBAN) 2 % ointment, SMARTSI Application Topical 2-3 Times Daily (Patient not taking: Reported on 2024), Disp: , Rfl:     nitrofurantoin, macrocrystal-monohydrate, (MACROBID) 100 MG capsule, Take 1 capsule (100 mg total) by mouth 2 (two) times daily. (Patient not taking: Reported on 2025), Disp: 14 capsule, Rfl: 0    pantoprazole (PROTONIX) 40 MG tablet, Take 1 tablet by mouth once daily., Disp: , Rfl:     sucralfate (CARAFATE) 1 gram tablet, Take 1 g by mouth 4 (four) times daily., Disp: , Rfl:

## 2025-05-21 ENCOUNTER — DOCUMENT SCAN (OUTPATIENT)
Dept: HOME HEALTH SERVICES | Facility: HOSPITAL | Age: 60
End: 2025-05-21
Payer: MEDICARE

## 2025-05-21 PROBLEM — F32.A ANXIETY AND DEPRESSION: Status: ACTIVE | Noted: 2025-05-21

## 2025-05-21 PROBLEM — F48.2 PSEUDOBULBAR AFFECT: Status: ACTIVE | Noted: 2025-05-21

## 2025-05-21 PROBLEM — F41.9 ANXIETY AND DEPRESSION: Status: ACTIVE | Noted: 2025-05-21

## 2025-05-21 NOTE — ASSESSMENT & PLAN NOTE
Chronic, S/p CVA. Spends most of his time in hospital bed but does have specialty wheelchair with use of sheila lift can get into chair every other day per wife.  ADL dependent on wife. Non-ambulatory.  Has chronic sacral/coccyx area pressure ulcer for years-managed by [facility] and Wound  NP home visits.  Continue offloading, ensure adequate protein intake.  Followed by PCP.

## 2025-05-21 NOTE — ASSESSMENT & PLAN NOTE
Chronic, slow healing wound, present for years.  Followed by Wound  NP weekly in home prior to recent respite care stay and Home Health RN twice weekly. Wife provides care on other day. Currently using santyl/silver alginate.  Educated on importance of offloading, s/s wound deterioration.  Monitor.

## 2025-05-21 NOTE — ASSESSMENT & PLAN NOTE
Chronic issue following CVA age 49.  [facility] and put on medication but it was very expensive per wife.  Patient cries at inappropriate times, becomes very emotional often per wife.  Educated wife on importance of follow up for this dx.  Will start low dose lexapro-has been on this in the past.

## 2025-05-21 NOTE — ASSESSMENT & PLAN NOTE
"Chronic, hx CVA age 49, non-ambulatory, ADL dependent, pseudobulbar affect.  Patient unable to participate in PHQ9 today, does verbalize anxiety and depression, wife agrees.  Follow[facility] but "it has been awhile".  Educated wife to make follow up appt ASAP.  Will begin Lexapro (has been on it in the past with good results reported, unsure why he was taken off of it).  Monitor.  "

## 2025-06-04 ENCOUNTER — TELEPHONE (OUTPATIENT)
Dept: HOME HEALTH SERVICES | Facility: CLINIC | Age: 60
End: 2025-06-04
Payer: MEDICARE

## 2025-06-06 ENCOUNTER — CARE AT HOME (OUTPATIENT)
Dept: HOME HEALTH SERVICES | Facility: CLINIC | Age: 60
End: 2025-06-06
Payer: MEDICARE

## 2025-06-06 DIAGNOSIS — Z74.1 REQUIRES ASSISTANCE WITH ACTIVITIES OF DAILY LIVING (ADL): ICD-10-CM

## 2025-06-06 DIAGNOSIS — F41.9 ANXIETY AND DEPRESSION: ICD-10-CM

## 2025-06-06 DIAGNOSIS — I69.354 HEMIPARESIS AFFECTING LEFT SIDE AS LATE EFFECT OF CEREBROVASCULAR ACCIDENT: ICD-10-CM

## 2025-06-06 DIAGNOSIS — F07.0 PERSONALITY CHANGE AS LATE EFFECT OF CEREBROVASCULAR ACCIDENT (CVA): ICD-10-CM

## 2025-06-06 DIAGNOSIS — F32.A ANXIETY AND DEPRESSION: ICD-10-CM

## 2025-06-06 DIAGNOSIS — I69.398 PERSONALITY CHANGE AS LATE EFFECT OF CEREBROVASCULAR ACCIDENT (CVA): ICD-10-CM

## 2025-06-06 DIAGNOSIS — F48.2 PSEUDOBULBAR AFFECT: Primary | ICD-10-CM

## 2025-06-07 VITALS
HEART RATE: 88 BPM | DIASTOLIC BLOOD PRESSURE: 72 MMHG | RESPIRATION RATE: 20 BRPM | TEMPERATURE: 99 F | OXYGEN SATURATION: 97 % | SYSTOLIC BLOOD PRESSURE: 136 MMHG

## 2025-06-07 NOTE — PROGRESS NOTES
[facility]  Medical Chronic Care Home Visit    Encounter Provider: Jailene Oviedo   PCP: Tanner Seals MD  Consult Requested By: No ref. provider found    HISTORY OF PRESENT ILLNESS      Patient ID: Nadeem Alegre is a 60 y.o. male is being seen in the home due to physical debility that presents a taxing effort to leave the home, to mitigate high risk of hospital readmission and/or due to the limited availability of reliable or safe options for transportation to the point of access to the provider. Prior to treatment on this visit the chart was reviewed and patient verbal consent was obtained.    Chronic medical conditions synopsis:  Nadeem Alegre is a 60 y.o.male with medical/surgical history including encephalitis, chronic disability, nonambulatory, anxiety, anemia, hypertension, hyperlipidemia, Hemiplegia and hemiparesis following cerebral infarction affecting left non-dominant side, chronic pressure ulcer of sacral region, stage 4, Atherosclerotic heart disease of native coronary artery with other forms of angina pectoris, mixed conductive and sensorineural hearing loss, neuromuscular dysfunction of bladder-chronic indwelling catheter via supra pubic site, neurogenic bowel,personal history of urinary (tract)infections, pseudobulbar affect, insomnia, obstructive sleep apnea.      [facility]     On last home visit 5/16/25 patient was reporting depression.Had been on lexapro years ago and wife and patient were not sure why he was longer on it. He has now been taking it for almost 4 weeks and both he and wife feel and see some improvement. He denies any self harm thoughts. Depression seems to be related to impaired mobility and ADL dependence following CVA several years ago.Wife has not been able to arrange Psych follow up because of transportation issues, handicap van needs repair, it is pending at this time.      Patient with Pseudubulbar affect, gets very emotional/cries at  inappropriate times. Wife states he has been on medication previously, [facility] but medication was too [facility].   Wife to reschedule Psych appt, had to cancel in last month due to transportation issues.      No distress noted. [facility] NP will follow patient every 8-12 weeks and prn due to difficulty leaving home. Program contact information provided to patient and left in home.       DECISION MAKING TODAY       Assessment & Plan:  1. Pseudobulbar affect  Assessment & Plan:  Chronic issue following CVA age 49.  [facility] and put on medication but it was very expensive per wife.  Patient cries at inappropriate times, becomes very emotional often per wife.  Educated wife on importance of follow up for this dx.  Continue lexapro at current dose. Monitor for worsening symptoms.      2. 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  [facility]iatry referral placed [facility] and patient has [facility] in [facility] but having transportation issues for follow up.      3. Hemiparesis affecting left side as late effect of cerebrovascular accident  Assessment & Plan:  Chronic, S/p CVA. Spends most of his time in hospital bed but does have specialty wheelchair with use of sheila lift can get into chair every other day per wife.  ADL dependent on wife maximally. Non-ambulatory.  Has chronic sacral/coccyx area pressure ulcer for years-managed by [facility] and Wound  NP home visits.  Continue offloading, ensure adequate protein intake.  Followed by PCP.      4. Requires assistance with activities of daily living (ADL)  Assessment & Plan:  Chronic, ADL dependent, due to CVA following ear infection, s/p mastoidectomy, CVA per wife at age 49.   Wife provides care.  Maximal DME use daily.      5.  "Anxiety and depression  Assessment & Plan:  Chronic, hx CVA age 49, non-ambulatory, ADL dependent, pseudobulbar affect.    [facility] but "it has been awhile", having transportation issues (private van they own has been at [employer] shop for months "waiting on parts"). This provider has referred patient to [facility] in [place] for closer proximity but wife still not able to get him there. Private ambulance service not affordable for transport to appointments per wife.  .  Lexapro started almost 4 weeks ago and patient and wife indicate improvement in depression and anxiety symptoms. Patient denies self harm thoughts. Continue current lexapro dose. Report any negative changes in mood, behavior and/or self harm thoughts.    Monitor.             ENVIRONMENT OF CARE      Family and/or Caregiver present at visit?  Yes  Name of Caregiver: spouse, Mely  History provided by: patient and caregiver    4Ms for Medical Decision-Making in Older Adults    Last Completed EAWV:  None    MEDICATIONS:  High Risk Medications:  Total Active Medications: 2  cyclobenzaprine - 10 MG  EScitalopram oxalate Tab - 5 MG    MOBILITY:  Activities of Daily Living:       No data to display              Fall Risk:      6/6/2025    10:10 AM 5/16/2025     8:00 AM 3/28/2025     8:00 AM   Fall Risk Assessment - Outpatient   Mobility Status Wheelchair Bound Stretcher Wheelchair Bound   Number of falls 0 0 0   Identified as fall risk True True      Disability Status:      4/13/2017     1:17 AM   Disability Status   Are you deaf or do you have serious difficulty hearing? N   Are you blind or do you have serious difficulty seeing, even when wearing glasses? N   Because of a physical, mental, or emotional condition, do you have serious difficulty concentrating, remembering, or making decisions? N   Do you have serious difficulty walking or climbing stairs? Y   Do you have difficulty dressing or " bathing? Y   Because of a physical, mental, or emotional condition, do you have difficulty doing errands alone such as visiting a doctor's office or shopping? Y     Nutrition Screening:       No data to display             Screening Score: 0-7 Malnourished, 8-11 At Risk, 12-14 Normal  Get Up and Go:       No data to display              Whisper Test:       No data to display                    MENTATION:   Has Dementia Dx: No  Has Anxiety Dx: Yes    Depression Patient Health Questionnaire:      3/16/2023     1:57 PM   Depression Patient Health Questionnaire   Over the last two weeks how often have you been bothered by little interest or pleasure in doing things Not at all    Over the last two weeks how often have you been bothered by feeling down, depressed or hopeless Not at all   PHQ-2 Total Score 0       Data saved with a previous flowsheet row definition     Cognitive Function Screening:       No data to display              Cognitive Function Screening Total - Less than 4 = Abnormal,  Greater than or equal to 4 = Normal        WHAT MATTERS MOST:  Advance Care Planning   ACP Status:   Patient has had an ACP conversation  Living Will: No  Power of : No  La[place]: No    What is most important right now is to focus on spending time at homeavoiding the hospitalremaining as independent as possiblesymptom/pain controlcomfort and QOL     Accordingly, we have decided that the best plan to meet the patient's goals includes continuing with treatment      What matters most to patient today is: pt not able to state           Is patient hospice appropriate: No  (If needed, use PPS <30 or FAST score >7)  Was referral to hospice placed: No    Impression upon entering the home:  Physical Dwelling: single family home   Appearance of home environment: cleaniness: dirty, walking pathways: cluttered, lighting: adequate, and home structure: sound structure  Functional Status: max assistance  Mobility: bedbound  Nutritional  access: adequate intake and access  Home Health: No, and does not need it at this time   DME/Supplies: hospital bed and specialty chair     Diagnostic tests reviewed/disposition: No diagnosic tests pending after this hospitalization.  Disease/illness education: depression  Establishment or re-establishment of referral orders for community resources: No other necessary community resources.   Discussion with other health care providers: No discussion with other health care providers necessary.   Does patient have a PCP at [facility]? Yes   Repatriation plan with PCP? [facility] reason: mobility   Does patient have an ostomy (ileostomy, colostomy, suprapubic catheter, nephrostomy tube, tracheostomy, PEG tube, pleurex catheter, cholecystostomy, etc)? Yes. Is it on problem list?yes  Were BPAs reviewed? Yes    Social History     Socioeconomic History    Marital status:    Occupational History     Employer: [employer]   Tobacco Use    Smoking status: Every Day     Types: Vaping with nicotine    Smokeless tobacco: Never   Substance and Sexual Activity    Alcohol use: Yes     Alcohol/week: 5.0 standard drinks of alcohol     Types: 6 Standard drinks or equivalent per week     Comment: occ.    Drug use: Never     Social Drivers of Health     Financial Resource Strain: Patient Declined (4/18/2025)    Received from [facility] [employer]    Overall Financial Resource Strain (CARDIA)     Difficulty of Paying Living Expenses: Patient declined   Food Insecurity: No Food Insecurity (4/18/2025)    Received from [facility]    Hunger Vital Sign     Worried About Running Out of Food in the Last Year: Never true     Ran Out of Food in the Last Year: Never true   Transportation Needs: Unmet Transportation Needs (4/18/2025)    Received from [facility]    PRAPARE - Transportation     Lack of Transportation (Medical): Yes     Lack of Transportation (Non-Medical): No   Physical Activity: Unknown (4/18/2025)    Received from [facility]    Exercise Vital  Sign     Days of Exercise per Week: 0 days   Recent Concern: Physical Activity - Inactive (4/1/2025)    Exercise Vital Sign     Days of Exercise per Week: 0 days     Minutes of Exercise per Session: 0 min   Stress: Stress Concern Present (4/18/2025)    Received from [facility] - Occupational Stress Questionnaire     Feeling of Stress : Very much   Housing Stability: Low Risk  (4/18/2025)    Received from [facility]    Housing Stability Vital Sign     Unable to Pay for Housing in the Last Year: No     Number of Times Moved in the Last Year: 1     Homeless in the Last Year: No         OBJECTIVE:     Vital Signs:  Vitals:    06/06/25 1230   BP: 136/72   Pulse: 88   Resp: 20   Temp: 98.7 °F (37.1 °C)     Review of Systems-wife assists  Constitutional:  Negative.  HENT: Negative.     Eyes: Negative.    Respiratory: Negative.     Cardiovascular: Negative.    Gastrointestinal: Negative.    Endocrine: Negative.    Genitourinary:         Has suprapubic mike cath-chronic   Musculoskeletal:  Positive for back pain.         Physical Exam:  Physical Exam  Constitutional:       General: He is not in acute distress.     Appearance: He is obese. He is not ill-appearing.      Comments: Minimally interactive   HENT:      Head: Normocephalic and atraumatic.      Right Ear: External ear normal.      Left Ear: External ear normal.      Nose: Nose normal.      Mouth/Throat:      Mouth: Mucous membranes are dry.      Pharynx: Oropharynx is clear.   Eyes:      Extraocular Movements: Extraocular movements intact.      Conjunctiva/sclera: Conjunctivae normal.      Pupils: Pupils are equal, round, and reactive to light.   Cardiovascular:      Rate and Rhythm: Normal rate and regular rhythm.      Pulses: Normal pulses.      Heart sounds: Normal heart sounds.   Pulmonary:      Effort: Pulmonary effort is normal.      Breath sounds: Normal breath sounds.   Abdominal:      General: Bowel sounds are  normal.      Palpations: Abdomen is soft.   Genitourinary:     Comments: 22 Fr mike in place via suprapubic site, WNL. Urine appears pale, yellow with small amount of sediment noted.  Musculoskeletal:      Cervical back: Neck supple.      Comments: Generalized atrophy   Skin:     General: Skin is warm and dry.      Capillary Refill: Capillary refill takes 2 to 3 seconds.      Comments: Chronic coccyx pressure injury (present for years). No s/s infection but non-healing.   Neurological:      Mental Status: He is alert.      Motor: Weakness present.      Comments: Oriented to person and place, forgetful  Bed bound mostly gets into specialty chair    Psychiatric:      Comments: Flat affect, tearful/emotional spontaneously but more interactive today than on previous visits    INSTRUCTIONS FOR PATIENT:     Scheduled Follow-up, Appts Reviewed with Modifications if Needed: Yes  No future appointments.    Current Medications[1]    Medication Reconciliation:  Were medications changed during this appointment? No  Were medications in the home? Yes  Is the patient taking the medications as directed? Yes  Does the patient/caregiver understand the medications and changes? Yes  Does updated med list accurately reflects meds patient is currently taking? Yes    I spent a total of 40 minutes on the day of the visit.This includes face to face time and non-face to face time preparing to see the patient (eg, review of tests), obtaining and/or reviewing separately obtained history, documenting clinical information in the electronic or other health record, independently interpreting results and communicating results to the patient/family/caregiver, or care coordinator.          Signature: Jailene Oviedo NP         [1]   Current Outpatient Medications:     ascorbic Acid (VITAMIN C) 500 mg CpSR, Vitamin C 500 mg capsule,extended release  Take 1 capsule twice a day by oral route., Disp: , Rfl:     aspirin (ECOTRIN) 81 MG EC tablet, Take  1 tablet by mouth once daily., Disp: , Rfl:     bisacodyL (DULCOLAX) 10 mg Supp, Place rectally., Disp: , Rfl:     CRANBERRY FRUIT EXTRACT (CRANBERRY CONCENTRATE ORAL), Take 1 oz by mouth every evening., Disp: , Rfl:     cyclobenzaprine (FLEXERIL) 10 MG tablet, Take 10 mg by mouth 3 (three) times daily as needed for Muscle spasms., Disp: , Rfl:     docusate sodium (COLACE) 100 MG capsule, Take 200 mg by mouth 2 (two) times daily. , Disp: , Rfl:     ergocalciferol (ERGOCALCIFEROL) 50,000 unit Cap, Take 1 capsule (50,000 Units total) by mouth every 7 days., Disp: 12 capsule, Rfl: 0    EScitalopram oxalate (LEXAPRO) 5 MG Tab, Take 1 tablet (5 mg total) by mouth once daily., Disp: 90 tablet, Rfl: 0    fish oil-omega-3 fatty acids 300-1,000 mg capsule, Take 2 g by mouth 2 (two) times daily. , Disp: , Rfl:     ibuprofen (ADVIL,MOTRIN) 800 MG tablet, Take 1 tablet (800 mg total) by mouth every 8 (eight) hours as needed for Pain., Disp: 90 tablet, Rfl: 5    magnesium oxide (MAG-OX) 400 mg tablet, Take 400 mg by mouth 2 (two) times daily. , Disp: , Rfl:     melatonin 5 mg Tab, Take by mouth nightly as needed. , Disp: , Rfl:     multivitamin (THERAGRAN) per tablet, Take 1 tablet by mouth once daily., Disp: , Rfl:     mupirocin (BACTROBAN) 2 % ointment, , Disp: , Rfl:     omeprazole (PRILOSEC) 20 MG capsule, Take 20 mg by mouth once daily., Disp: , Rfl:     ondansetron (ZOFRAN-ODT) 8 MG TbDL, Take 1 tablet (8 mg total) by mouth every 8 (eight) hours as needed., Disp: 10 tablet, Rfl: 2    polyethylene glycol (GLYCOLAX) 17 gram PwPk, Take 17 g by mouth 2 (two) times daily., Disp: 100 each, Rfl: 6    potassium chloride SA (K-DUR,KLOR-CON M) 10 MEQ tablet, Take by mouth 2 (two) times daily., Disp: , Rfl:     senna (SENOKOT) 8.6 mg tablet, Take 2 tablets by mouth 2 (two) times daily. , Disp: , Rfl:     vitamin E 100 UNIT capsule, Take 100 Units by mouth once daily., Disp: , Rfl:     zinc gluconate 50 mg tablet, Take 50 mg by mouth  once daily., Disp: , Rfl:     albuterol (VENTOLIN HFA) 90 mcg/actuation inhaler, Inhale 2 puffs into the lungs every 4 (four) hours as needed for Wheezing or Shortness of Breath. Rescue, Disp: 18 g, Rfl: 3    ALPRAZolam (XANAX) 0.5 MG tablet, Take 1 tablet (0.5 mg total) by mouth 2 (two) times daily as needed for Anxiety., Disp: 30 tablet, Rfl: 0

## 2025-06-11 NOTE — ASSESSMENT & PLAN NOTE
"Chronic, hx CVA age 49, non-ambulatory, ADL dependent, pseudobulbar affect.    [facility] but "it has been awhile", having transportation issues (private van they own has been at [employer] shop for months "waiting on parts"). This provider has referred patient to [facility] in [place] for closer proximity but wife still not able to get him there. Private ambulance service not affordable for transport to appointments per wife.  .  Lexapro started almost 4 weeks ago and patient and wife indicate improvement in depression and anxiety symptoms. Patient denies self harm thoughts. Continue current lexapro dose. Report any negative changes in mood, behavior and/or self harm thoughts.    Monitor.  "

## 2025-06-11 NOTE — ASSESSMENT & PLAN NOTE
Chronic issue following CVA age 49.  [facility] in past and put on medication but it was very expensive per wife.  Patient cries at inappropriate times, becomes very emotional often per wife.  Educated wife on importance of follow up for this dx.  Continue lexapro at current dose. Monitor for worsening symptoms.

## 2025-06-11 NOTE — ASSESSMENT & PLAN NOTE
Affect is flat, wife reports he is not agreeable often.  Psychiatry referral placed previously for [facility] and patient has been followed by [facility] in [facility] but having transportation issues for follow up.

## 2025-06-11 NOTE — ASSESSMENT & PLAN NOTE
Chronic, S/p CVA. Spends most of his time in hospital bed but does have specialty wheelchair with use of sheila lift can get into chair every other day per wife.  ADL dependent on wife maximally. Non-ambulatory.  Has chronic sacral/coccyx area pressure ulcer for years-managed by GERON[facility] JULIAN and Wound  NP home visits.  Continue offloading, ensure adequate protein intake.  Followed by PCP.

## 2025-06-20 ENCOUNTER — EXTERNAL HOME HEALTH (OUTPATIENT)
Dept: HOME HEALTH SERVICES | Facility: HOSPITAL | Age: 60
End: 2025-06-20
Payer: MEDICARE

## 2025-06-28 ENCOUNTER — PATIENT MESSAGE (OUTPATIENT)
Dept: FAMILY MEDICINE | Facility: CLINIC | Age: 60
End: 2025-06-28
Payer: MEDICARE

## 2025-07-11 PROCEDURE — G0179 MD RECERTIFICATION HHA PT: HCPCS | Mod: ,,, | Performed by: INTERNAL MEDICINE

## 2025-07-18 ENCOUNTER — EXTERNAL HOME HEALTH (OUTPATIENT)
Dept: HOME HEALTH SERVICES | Facility: HOSPITAL | Age: 60
End: 2025-07-18
Payer: MEDICARE

## 2025-08-08 ENCOUNTER — CARE AT HOME (OUTPATIENT)
Dept: HOME HEALTH SERVICES | Facility: CLINIC | Age: 60
End: 2025-08-08
Payer: MEDICARE

## 2025-08-08 ENCOUNTER — E-VISIT (OUTPATIENT)
Dept: FAMILY MEDICINE | Facility: CLINIC | Age: 60
End: 2025-08-08
Payer: MEDICARE

## 2025-08-08 DIAGNOSIS — R53.81 DEBILITY: ICD-10-CM

## 2025-08-08 DIAGNOSIS — F41.9 ANXIETY AND DEPRESSION: ICD-10-CM

## 2025-08-08 DIAGNOSIS — R33.9 URINARY RETENTION: ICD-10-CM

## 2025-08-08 DIAGNOSIS — Z93.59 SUPRAPUBIC CATHETER: ICD-10-CM

## 2025-08-08 DIAGNOSIS — I69.354 HEMIPARESIS AFFECTING LEFT SIDE AS LATE EFFECT OF CEREBROVASCULAR ACCIDENT: Primary | ICD-10-CM

## 2025-08-08 DIAGNOSIS — F41.9 ANXIETY: ICD-10-CM

## 2025-08-08 DIAGNOSIS — F32.A ANXIETY AND DEPRESSION: ICD-10-CM

## 2025-08-08 DIAGNOSIS — G81.94 LEFT HEMIPARESIS: Primary | ICD-10-CM

## 2025-08-08 PROCEDURE — 1159F MED LIST DOCD IN RCRD: CPT | Mod: CPTII,S$GLB,, | Performed by: NURSE PRACTITIONER

## 2025-08-08 PROCEDURE — 3077F SYST BP >= 140 MM HG: CPT | Mod: CPTII,S$GLB,, | Performed by: NURSE PRACTITIONER

## 2025-08-08 PROCEDURE — 99499 UNLISTED E&M SERVICE: CPT | Mod: ,,, | Performed by: INTERNAL MEDICINE

## 2025-08-08 PROCEDURE — 1160F RVW MEDS BY RX/DR IN RCRD: CPT | Mod: CPTII,S$GLB,, | Performed by: NURSE PRACTITIONER

## 2025-08-08 PROCEDURE — 3078F DIAST BP <80 MM HG: CPT | Mod: CPTII,S$GLB,, | Performed by: NURSE PRACTITIONER

## 2025-08-08 PROCEDURE — 3044F HG A1C LEVEL LT 7.0%: CPT | Mod: CPTII,S$GLB,, | Performed by: NURSE PRACTITIONER

## 2025-08-08 PROCEDURE — 99349 HOME/RES VST EST MOD MDM 40: CPT | Mod: S$GLB,,, | Performed by: NURSE PRACTITIONER

## 2025-08-08 RX ORDER — ALPRAZOLAM 0.5 MG/1
0.5 TABLET ORAL 2 TIMES DAILY PRN
Qty: 30 TABLET | Refills: 0 | OUTPATIENT
Start: 2025-08-08 | End: 2025-09-07

## 2025-08-09 VITALS
HEART RATE: 60 BPM | TEMPERATURE: 98 F | SYSTOLIC BLOOD PRESSURE: 140 MMHG | RESPIRATION RATE: 20 BRPM | DIASTOLIC BLOOD PRESSURE: 78 MMHG | OXYGEN SATURATION: 97 %

## 2025-08-09 NOTE — ASSESSMENT & PLAN NOTE
"Chronic, hx CVA age 49, non-ambulatory, ADL dependent, pseudobulbar affect.     [facility] but has not had recent follow up due to transportation issues (private van they own has been at [employer] shop for months "waiting on parts"). This provider has referred patient to [facility] in [place] for closer proximity but wife still not able to get him there. Private ambulance service not affordable for transport to appointments per wife.    Lexapro started early May 2025 and patient has displayed significant improvement per spouse. Patient also agrees he feels better on lexapro. Depression symptoms are stable at this time and there is no need for dose changing at this time.  Patient denies self harm thoughts. Continue current lexapro dose. Report any negative changes in mood, behavior and/or self harm thoughts.     Continue to monitor. Report any worsening symptoms. Report to ER if depression escalates or self harm thoughts occur. Follow up with Psychiatry.  "

## 2025-08-09 NOTE — PROGRESS NOTES
[facility]  Medical Chronic Care Home Visit    Encounter Provider: Jailene Oviedo   PCP: Tanner Seals MD  Consult Requested By: No ref. provider found    HISTORY OF PRESENT ILLNESS      Patient ID: Nadeem Alegre is a 60 y.o. male is being seen in the home due to physical debility that presents a taxing effort to leave the home, to mitigate high risk of hospital readmission and/or due to the limited availability of reliable or safe options for transportation to the point of access to the provider. Prior to treatment on this visit the chart was reviewed and patient verbal consent was obtained.    Chronic medical conditions synopsis:  Nadeem Alegre is a 60 y.o.male with medical/surgical history including encephalitis, chronic disability, nonambulatory, anxiety, anemia, hypertension, hyperlipidemia, Hemiplegia and hemiparesis following cerebral infarction affecting left non-dominant side, chronic pressure ulcer of sacral region, stage 4, Atherosclerotic heart disease of native coronary artery with other forms of angina pectoris, mixed conductive and sensorineural hearing loss, neuromuscular dysfunction of bladder-chronic indwelling catheter via supra pubic site, neurogenic bowel,personal history of urinary (tract)infections, pseudobulbar affect, insomnia, obstructive sleep apnea.      [facility] Singleton     With this [facility] NP visit patient is found lying in hospital bed, AAO x 2. Has no complaints. Affect is flat as usual but he is more interactive today and less emotional than on previous visits. Wife Mely reports lexapro started in early May 2025 seems to be helping his depression and patient is also in agreement with this. Wife has still not been able to get patient to Psychiatry due to broken down specialty vehicle still waiting on being repaired.     GER[facility]IMO JULIAN RN, [facility], present today during visit as well.        No distress noted. [facility] NP will follow  "patient every 8-12 weeks and prn due to difficulty leaving home. Program contact information provided to patient and left in home.          DECISION MAKING TODAY       Assessment & Plan:  1. Hemiparesis affecting left side as late effect of cerebrovascular accident  Assessment & Plan:  Chronic, without any recent decompensation.  S/p CVA. Spends most of his time in hospital bed but does have specialty wheelchair with use of sheila lift can get into chair every other day per wife.  ADL dependent on wife maximally. Non-ambulatory.  Has chronic sacral/coccyx area pressure ulcer for years-managed by [facility] and Wound  NP home visits.  Continue offloading, ensure adequate protein intake.  Followed by PCP.      2. Anxiety and depression  Assessment & Plan:  Chronic, hx CVA age 49, non-ambulatory, ADL dependent, pseudobulbar affect.     [facility] but has not had recent follow up due to transportation issues (private van they own has been at  shop for months "waiting on parts"). This provider has referred patient to [facility] in [place] for closer proximity but wife still not able to get him there. Private ambulance service not affordable for transport to appointments per wife.    Lexapro started early May 2025 and patient has displayed significant improvement per spouse. Patient also agrees he feels better on lexapro. Depression symptoms are stable at this time and there is no need for dose changing at this time.  Patient denies self harm thoughts. Continue current lexapro dose. Report any negative changes in mood, behavior and/or self harm thoughts.     Continue to monitor. Report any worsening symptoms. Report to ER if depression escalates or self harm thoughts occur. Follow up with Psychiatry.      3. Suprapubic catheter  Overview:  Suprapubic     Assessment & Plan:  Chronic, related to CVA, neurogenic bladder.  Has chronic indwelling " catheter via supra pubic site.  Followed by HH for mike care and [facility] in [facility], needs follow up-wife instructed to make appt.  Hx of UTIs-hospitalization for UTI,sepsis.   Monitor.  Educated on s/s UTI, urine appearance and when to report, adequate oral hydration.   Continue HH.      4. Urinary retention  Assessment & Plan:  Chronic, related to CVA.  Has chronic indwelling cath via supra pubic site.  Followed by HH for mike care.  Hx of UTIs-recent Ecoli UTI, treatment with oral antibiotics.  Followed by Urology.   Continue HH for mike care.      5. Debility  Assessment & Plan:  Chronic, ADL dependent, due to CVA following ear infection, s/p mastoidectomy, CVA per wife at age 49.   Wife provides care.  Maximal DME use daily.             ENVIRONMENT OF CARE      Family and/or Caregiver present at visit?  Yes  Name of Caregiver: spouse, Mely  History provided by: patient and caregiver    4Ms for Medical Decision-Making in Older Adults    Last Completed EAWV:  None    MEDICATIONS:  High Risk Medications:  Total Active Medications: 2  cyclobenzaprine - 10 MG  EScitalopram oxalate Tab - 5 MG    MOBILITY:  Activities of Daily Living:       No data to display              Fall Risk:      8/8/2025    12:00 PM 6/6/2025    10:10 AM 5/16/2025     8:00 AM   Fall Risk Assessment - Outpatient   Mobility Status Stretcher Wheelchair Bound Stretcher   Number of falls 0 0 0   Identified as fall risk True True True     Disability Status:      4/13/2017     1:17 AM   Disability Status   Are you deaf or do you have serious difficulty hearing? N   Are you blind or do you have serious difficulty seeing, even when wearing glasses? N   Because of a physical, mental, or emotional condition, do you have serious difficulty concentrating, remembering, or making decisions? N   Do you have serious difficulty walking or climbing stairs? Y   Do you have difficulty dressing or bathing? Y   Because of a physical, mental, or emotional  condition, do you have difficulty doing errands alone such as visiting a doctor's office or shopping? Y     Nutrition Screening:       No data to display             Screening Score: 0-7 Malnourished, 8-11 At Risk, 12-14 Normal  Get Up and Go:       No data to display              Whisper Test:       No data to display                    MENTATION:   Has Dementia Dx: No  Has Anxiety Dx: Yes    Depression Patient Health Questionnaire:      3/16/2023     1:57 PM   Depression Patient Health Questionnaire   Over the last two weeks how often have you been bothered by little interest or pleasure in doing things Not at all    Over the last two weeks how often have you been bothered by feeling down, depressed or hopeless Not at all   PHQ-2 Total Score 0       Data saved with a previous flowsheet row definition     Cognitive Function Screening:       No data to display              Cognitive Function Screening Total - Less than 4 = Abnormal,  Greater than or equal to 4 = Normal        WHAT MATTERS MOST:  Advance Care Planning   ACP Status:   Patient has had an ACP conversation  Living Will: No  Power of : No  POLST: No    Patient did not wish or was not able to name a surrogate decision maker or provide an Advance Care Plan.           Is patient hospice appropriate: No  (If needed, use PPS <30 or FAST score >7)  Was referral to hospice placed: No    Impression upon entering the home:  Physical Dwelling: single family home   Appearance of home environment: cleaniness: clean, walking pathways: cluttered, lighting: adequate, and home structure: sound structure  Functional Status: max assistance  Mobility: bedbound  Nutritional access: adequate intake and access  Home Health: Yes,  Agency [facility]    DME/Supplies: hospital bed and specialty chair     Diagnostic tests reviewed/disposition: No diagnosic tests pending after this hospitalization.  Disease/illness education: depression  Establishment or re-establishment of  referral orders for community resources: No other necessary community resources.   Discussion with other health care providers: No discussion with other health care providers necessary.   Does patient have a PCP at [facility]? Yes   Repatriation plan with PCP? follow-up with PCP within 30d   Does patient have an ostomy (ileostomy, colostomy, suprapubic catheter, nephrostomy tube, tracheostomy, PEG tube, pleurex catheter, cholecystostomy, etc)? Yes. Is it on problem list?yes  Were BPAs reviewed? Yes    Social History     Socioeconomic History    Marital status:    Occupational History     Employer: [employer]   Tobacco Use    Smoking status: Every Day     Types: Vaping with nicotine    Smokeless tobacco: Never   Substance and Sexual Activity    Alcohol use: Yes     Alcohol/week: 5.0 standard drinks of alcohol     Types: 6 Standard drinks or equivalent per week     Comment: occ.    Drug use: Never     Social Drivers of Health     Financial Resource Strain: Patient Declined (4/18/2025)    Received from [facility]    Overall Financial Resource Strain (CARDIA)     Difficulty of Paying Living Expenses: Patient declined   Food Insecurity: No Food Insecurity (4/18/2025)    Received from [facility]    Hunger Vital Sign     Worried About Running Out of Food in the Last Year: Never true     Ran Out of Food in the Last Year: Never true   Transportation Needs: Unmet Transportation Needs (4/18/2025)    Received from [facility]    PRAPARE - Transportation     Lack of Transportation (Medical): Yes     Lack of Transportation (Non-Medical): No   Physical Activity: Unknown (4/18/2025)    Received from [facility]    Exercise Vital Sign     Days of Exercise per Week: 0 days   Recent Concern: Physical Activity - Inactive (4/1/2025)    Exercise Vital Sign     Days of Exercise per Week: 0 days     Minutes of Exercise per Session: 0 min   Stress: Stress Concern Present (4/18/2025)    Received from [facility]  [facility] - Occupational Stress Questionnaire     Feeling of Stress : Very much   Housing Stability: Low Risk  (4/18/2025)    Received from [facility]    Housing Stability Vital Sign     Unable to Pay for Housing in the Last Year: No     Number of Times Moved in the Last Year: 1     Homeless in the Last Year: No         OBJECTIVE:     Vital Signs:  Vitals:    08/08/25 1130   BP: (!) 140/78   Pulse: 60   Resp: 20   Temp: 97.6 °F (36.4 °C)     Review of Systems-wife assists  Constitutional:  Negative.  HENT: Negative.     Eyes: Negative.    Respiratory: Negative.     Cardiovascular: Negative.    Gastrointestinal: Negative.    Endocrine: Negative.    Genitourinary:         Has suprapubic mike cath-chronic   Musculoskeletal:  Positive for back pain.         Physical Exam:  Physical Exam  Constitutional:       General: He is not in acute distress.     Appearance: He is obese. He is not ill-appearing.      Comments: Minimally interactive   HENT:      Head: Normocephalic and atraumatic.      Right Ear: External ear normal.      Left Ear: External ear normal.      Nose: Nose normal.      Mouth/Throat:      Mouth: Mucous membranes are dry.      Pharynx: Oropharynx is clear.   Eyes:      Extraocular Movements: Extraocular movements intact.      Conjunctiva/sclera: Conjunctivae normal.      Pupils: Pupils are equal, round, and reactive to light.   Cardiovascular:      Rate and Rhythm: Normal rate and regular rhythm.      Pulses: Normal pulses.      Heart sounds: Normal heart sounds.   Pulmonary:      Effort: Pulmonary effort is normal.      Breath sounds: Normal breath sounds.   Abdominal:      General: Bowel sounds are normal.      Palpations: Abdomen is soft.   Genitourinary:     Comments: 22 Fr mike in place via suprapubic site, WNL. Urine appears pale, yellow with small amount of sediment noted.  Musculoskeletal:      Cervical back: Neck supple.      Comments: Generalized atrophy   Skin:     General: Skin is  warm and dry.      Capillary Refill: Capillary refill takes 2 to 3 seconds.      Comments: Chronic coccyx pressure injury (present for years). No s/s infection but non-healing.   Neurological:      Mental Status: He is alert.      Motor: Weakness present.      Comments: Oriented to person and place, forgetful  Bed bound mostly gets into specialty chair    Psychiatric:      Comments: Flat affect, not tearful or emotional today, interactive, smiles occasionally      INSTRUCTIONS FOR PATIENT:     Scheduled Follow-up, Appts Reviewed with Modifications if Needed: Yes  No future appointments.    Current Medications[1]    Medication Reconciliation:  Were medications changed during this appointment? No  Were medications in the home? Yes  Is the patient taking the medications as directed? Yes  Does the patient/caregiver understand the medications and changes? Yes  Does updated med list accurately reflects meds patient is currently taking? Yes    I spent a total of 30 minutes on the day of the visit.This includes face to face time and non-face to face time preparing to see the patient (eg, review of tests), obtaining and/or reviewing separately obtained history, documenting clinical information in the electronic or other health record, independently interpreting results and communicating results to the patient/family/caregiver, or care coordinator.        Signature: Jailene Oviedo NP       [1]   Current Outpatient Medications:     ascorbic Acid (VITAMIN C) 500 mg CpSR, Vitamin C 500 mg capsule,extended release  Take 1 capsule twice a day by oral route., Disp: , Rfl:     aspirin (ECOTRIN) 81 MG EC tablet, Take 1 tablet by mouth once daily., Disp: , Rfl:     bisacodyL (DULCOLAX) 10 mg Supp, Place rectally., Disp: , Rfl:     CRANBERRY FRUIT EXTRACT (CRANBERRY CONCENTRATE ORAL), Take 1 oz by mouth every evening., Disp: , Rfl:     cyclobenzaprine (FLEXERIL) 10 MG tablet, Take 10 mg by mouth 3 (three) times daily as needed for  Muscle spasms., Disp: , Rfl:     docusate sodium (COLACE) 100 MG capsule, Take 200 mg by mouth 2 (two) times daily. , Disp: , Rfl:     ergocalciferol (ERGOCALCIFEROL) 50,000 unit Cap, Take 1 capsule (50,000 Units total) by mouth every 7 days., Disp: 12 capsule, Rfl: 0    EScitalopram oxalate (LEXAPRO) 5 MG Tab, Take 1 tablet (5 mg total) by mouth once daily., Disp: 90 tablet, Rfl: 0    fish oil-omega-3 fatty acids 300-1,000 mg capsule, Take 2 g by mouth 2 (two) times daily. , Disp: , Rfl:     ibuprofen (ADVIL,MOTRIN) 800 MG tablet, Take 1 tablet (800 mg total) by mouth every 8 (eight) hours as needed for Pain., Disp: 90 tablet, Rfl: 5    magnesium oxide (MAG-OX) 400 mg tablet, Take 400 mg by mouth 2 (two) times daily. , Disp: , Rfl:     melatonin 5 mg Tab, Take by mouth nightly as needed. , Disp: , Rfl:     multivitamin (THERAGRAN) per tablet, Take 1 tablet by mouth once daily., Disp: , Rfl:     omeprazole (PRILOSEC) 20 MG capsule, Take 20 mg by mouth once daily., Disp: , Rfl:     ondansetron (ZOFRAN-ODT) 8 MG TbDL, Take 1 tablet (8 mg total) by mouth every 8 (eight) hours as needed., Disp: 10 tablet, Rfl: 2    polyethylene glycol (GLYCOLAX) 17 gram PwPk, Take 17 g by mouth 2 (two) times daily., Disp: 100 each, Rfl: 6    potassium chloride SA (K-DUR,KLOR-CON M) 10 MEQ tablet, Take by mouth 2 (two) times daily., Disp: , Rfl:     senna (SENOKOT) 8.6 mg tablet, Take 2 tablets by mouth 2 (two) times daily. , Disp: , Rfl:     vitamin E 100 UNIT capsule, Take 100 Units by mouth once daily., Disp: , Rfl:     albuterol (VENTOLIN HFA) 90 mcg/actuation inhaler, Inhale 2 puffs into the lungs every 4 (four) hours as needed for Wheezing or Shortness of Breath. Rescue, Disp: 18 g, Rfl: 3    ALPRAZolam (XANAX) 0.5 MG tablet, Take 1 tablet (0.5 mg total) by mouth 2 (two) times daily as needed for Anxiety., Disp: 30 tablet, Rfl: 0    mupirocin (BACTROBAN) 2 % ointment, , Disp: , Rfl:     zinc gluconate 50 mg tablet, Take 50 mg by  mouth once daily., Disp: , Rfl:

## 2025-08-09 NOTE — PROGRESS NOTES
Patient ID: Nadeem Alegre is a 60 y.o. male.        E-Visit Time Tracking:             Chief Complaint: General Illness (Entered automatically based on patient selection in [facility].)      The patient initiated a request through [employer] on 8/8/2025 for evaluation and management with a chief complaint of General Illness (Entered automatically based on patient selection in [employer]fold.)     I evaluated the questionnaire submission on 08/09/2025.    [facility] Peq Evisit Supergroup-Common Problems    8/8/2025 12:48 PM CDT - Filed by Mely Wells (Proxy)   What do you need help with? Other Concern   Do you agree to participate in an E-Visit? Yes   If you have any of the following symptoms, please go to the nearest emergency room or call 911: I acknowledge   What is the main issue you would like addressed today? Disability forms- follow up meds   Please describe your symptoms. anxiety, general follow up   Where is your problem located? anxiety   On a scale of 1-10, where 10 is the worst you can imagine, how severe are your symptoms? (range: 1 - 10) 6   Have you had these symptoms before? Yes   How long have you been having these symptoms? (Just today, For a few days, For a week, For one to four weeks, For more than a month) More than a month   What helps with your symptoms? xanax   What makes your symptoms feel worse? varied   Are these symptoms related to a condition that you currently have? (Yes, No, Not sure) Yes   What condition do you currently have? stroke   When were you last seen for this condition? 8/8/2025   Provide any information you feel is important to your history not asked above [facility] Disability needs Attending MD form. Sent msg, received reply to schedule visit. NP Home visit today. Form attached.   Please attach any relevant images or files File not available.   Are you able to take your vitals? No         Encounter Diagnosis   Name Primary?    Left hemiparesis Yes        No orders of the defined types were  Spoke to the wife and patient in detail. Pt was released from [facility] and was needing to go over his medication list with his home supply drugs. Patient is not a good historian with his medications list. He did know a few medications and was able to tell me if he was taking them or not.  Wife reviewed her concerns about his medications. Family friend left a note asking about several medications in particular. Asked about duloxetine and seroquel which this nurse told the wife was d/c'd 7/22. Asked about citalopram 40 mg. Stated his med list from [place] has him taking 30 mg dose and all he had was 40 mg dose. Advised her that per his chart, he is to be on the 40 mg dose. Told her I would ask Cj about different doses.  She asked if he was to be taking lisinopril. Advised her per his chart, he was to be taking 20 mg. They are needing a refill on this drug sent over to his [facility]'s pharmacy.  Told her Cj normally doesn't fill this drug but would ask her if she was ok to. Was placed on buspirone 10 mg BID which is not noted in his chart. Asked if he was to be on this medication.  Looks like from his chart that patient called on call doctor which was Stacey and she refilled the lisinopril and amlodipine for 30 day supply. Please advise.    placed in this encounter.           No follow-ups on file.

## 2025-08-09 NOTE — ASSESSMENT & PLAN NOTE
Chronic, without any recent decompensation.  S/p CVA. Spends most of his time in hospital bed but does have specialty wheelchair with use of sheila lift can get into chair every other day per wife.  ADL dependent on wife maximally. Non-ambulatory.  Has chronic sacral/coccyx area pressure ulcer for years-managed by [facility] and Wound  NP home visits.  Continue offloading, ensure adequate protein intake.  Followed by PCP.

## 2025-08-12 DIAGNOSIS — F32.A ANXIETY AND DEPRESSION: ICD-10-CM

## 2025-08-12 DIAGNOSIS — F41.9 ANXIETY AND DEPRESSION: ICD-10-CM

## 2025-08-12 RX ORDER — ESCITALOPRAM OXALATE 5 MG/1
5 TABLET ORAL
Qty: 90 TABLET | Refills: 0 | Status: SHIPPED | OUTPATIENT
Start: 2025-08-12

## 2025-08-19 ENCOUNTER — PATIENT MESSAGE (OUTPATIENT)
Dept: ADMINISTRATIVE | Facility: HOSPITAL | Age: 60
End: 2025-08-19
Payer: MEDICARE

## 2025-08-25 ENCOUNTER — DOCUMENT SCAN (OUTPATIENT)
Dept: HOME HEALTH SERVICES | Facility: HOSPITAL | Age: 60
End: 2025-08-25
Payer: MEDICARE

## 2025-08-25 DIAGNOSIS — F41.9 ANXIETY: ICD-10-CM

## 2025-08-27 RX ORDER — ALPRAZOLAM 0.5 MG/1
0.5 TABLET ORAL DAILY PRN
Qty: 30 TABLET | Refills: 2 | Status: SHIPPED | OUTPATIENT
Start: 2025-08-27

## 2025-08-30 ENCOUNTER — EXTERNAL HOME HEALTH (OUTPATIENT)
Dept: HOME HEALTH SERVICES | Facility: HOSPITAL | Age: 60
End: 2025-08-30
Payer: MEDICARE

## 2025-09-04 ENCOUNTER — TELEPHONE (OUTPATIENT)
Dept: FAMILY MEDICINE | Facility: CLINIC | Age: 60
End: 2025-09-04

## 2025-09-04 ENCOUNTER — E-VISIT (OUTPATIENT)
Dept: FAMILY MEDICINE | Facility: CLINIC | Age: 60
End: 2025-09-04
Payer: MEDICARE

## 2025-09-04 DIAGNOSIS — N39.0 URINARY TRACT INFECTION ASSOCIATED WITH INDWELLING URETHRAL CATHETER, INITIAL ENCOUNTER: Primary | ICD-10-CM

## 2025-09-04 DIAGNOSIS — N30.80 ACUTE BACTERIAL SIMPLE CYSTITIS: ICD-10-CM

## 2025-09-04 DIAGNOSIS — B96.89 ACUTE BACTERIAL SIMPLE CYSTITIS: ICD-10-CM

## 2025-09-04 DIAGNOSIS — T83.511A URINARY TRACT INFECTION ASSOCIATED WITH INDWELLING URETHRAL CATHETER, INITIAL ENCOUNTER: Primary | ICD-10-CM

## 2025-09-04 RX ORDER — CEFDINIR 300 MG/1
300 CAPSULE ORAL 2 TIMES DAILY
Qty: 14 CAPSULE | Refills: 0 | Status: SHIPPED | OUTPATIENT
Start: 2025-09-04 | End: 2025-09-11